# Patient Record
Sex: MALE | Race: WHITE | NOT HISPANIC OR LATINO | Employment: PART TIME | URBAN - METROPOLITAN AREA
[De-identification: names, ages, dates, MRNs, and addresses within clinical notes are randomized per-mention and may not be internally consistent; named-entity substitution may affect disease eponyms.]

---

## 2019-09-25 ENCOUNTER — NURSE TRIAGE (OUTPATIENT)
Dept: NURSING | Facility: CLINIC | Age: 69
End: 2019-09-25

## 2019-09-25 ENCOUNTER — HOSPITAL ENCOUNTER (EMERGENCY)
Facility: CLINIC | Age: 69
Discharge: HOME | DRG: 854 | End: 2019-09-25
Attending: PHYSICIAN ASSISTANT | Admitting: PHYSICIAN ASSISTANT
Payer: MEDICARE

## 2019-09-25 VITALS
DIASTOLIC BLOOD PRESSURE: 82 MMHG | SYSTOLIC BLOOD PRESSURE: 125 MMHG | HEIGHT: 67 IN | BODY MASS INDEX: 31.31 KG/M2 | HEART RATE: 87 BPM | WEIGHT: 199.5 LBS | RESPIRATION RATE: 18 BRPM | TEMPERATURE: 99 F | OXYGEN SATURATION: 97 %

## 2019-09-25 DIAGNOSIS — L03.012 CELLULITIS OF FINGER OF LEFT HAND: ICD-10-CM

## 2019-09-25 LAB
ANION GAP SERPL CALCULATED.3IONS-SCNC: 8 MMOL/L (ref 3–14)
BASOPHILS # BLD AUTO: 0 10E9/L (ref 0–0.2)
BASOPHILS NFR BLD AUTO: 0.2 %
BUN SERPL-MCNC: 13 MG/DL (ref 7–30)
CALCIUM SERPL-MCNC: 8.9 MG/DL (ref 8.5–10.1)
CHLORIDE SERPL-SCNC: 111 MMOL/L (ref 94–109)
CO2 SERPL-SCNC: 24 MMOL/L (ref 20–32)
CREAT SERPL-MCNC: 0.76 MG/DL (ref 0.66–1.25)
CRP SERPL-MCNC: 43.5 MG/L (ref 0–8)
DIFFERENTIAL METHOD BLD: ABNORMAL
EOSINOPHIL NFR BLD AUTO: 0.5 %
ERYTHROCYTE [DISTWIDTH] IN BLOOD BY AUTOMATED COUNT: 13.6 % (ref 10–15)
GFR SERPL CREATININE-BSD FRML MDRD: >90 ML/MIN/{1.73_M2}
GLUCOSE SERPL-MCNC: 122 MG/DL (ref 70–99)
HCT VFR BLD AUTO: 45.9 % (ref 40–53)
HGB BLD-MCNC: 15.7 G/DL (ref 13.3–17.7)
IMM GRANULOCYTES # BLD: 0.1 10E9/L (ref 0–0.4)
IMM GRANULOCYTES NFR BLD: 0.4 %
LACTATE BLD-SCNC: 1.1 MMOL/L (ref 0.7–2)
LYMPHOCYTES # BLD AUTO: 1.3 10E9/L (ref 0.8–5.3)
LYMPHOCYTES NFR BLD AUTO: 6.6 %
MCH RBC QN AUTO: 31.3 PG (ref 26.5–33)
MCHC RBC AUTO-ENTMCNC: 34.2 G/DL (ref 31.5–36.5)
MCV RBC AUTO: 91 FL (ref 78–100)
MONOCYTES # BLD AUTO: 1 10E9/L (ref 0–1.3)
MONOCYTES NFR BLD AUTO: 5.5 %
NEUTROPHILS # BLD AUTO: 16.5 10E9/L (ref 1.6–8.3)
NEUTROPHILS NFR BLD AUTO: 86.8 %
NRBC # BLD AUTO: 0 10*3/UL
NRBC BLD AUTO-RTO: 0 /100
PLATELET # BLD AUTO: 204 10E9/L (ref 150–450)
POTASSIUM SERPL-SCNC: 3.8 MMOL/L (ref 3.4–5.3)
RBC # BLD AUTO: 5.02 10E12/L (ref 4.4–5.9)
SODIUM SERPL-SCNC: 143 MMOL/L (ref 133–144)
WBC # BLD AUTO: 19 10E9/L (ref 4–11)

## 2019-09-25 PROCEDURE — 25000128 H RX IP 250 OP 636: Performed by: PHYSICIAN ASSISTANT

## 2019-09-25 PROCEDURE — 99284 EMERGENCY DEPT VISIT MOD MDM: CPT | Mod: Z6 | Performed by: PHYSICIAN ASSISTANT

## 2019-09-25 PROCEDURE — 86140 C-REACTIVE PROTEIN: CPT | Performed by: PHYSICIAN ASSISTANT

## 2019-09-25 PROCEDURE — 83605 ASSAY OF LACTIC ACID: CPT | Performed by: PHYSICIAN ASSISTANT

## 2019-09-25 PROCEDURE — 96365 THER/PROPH/DIAG IV INF INIT: CPT | Performed by: PHYSICIAN ASSISTANT

## 2019-09-25 PROCEDURE — 80048 BASIC METABOLIC PNL TOTAL CA: CPT | Performed by: PHYSICIAN ASSISTANT

## 2019-09-25 PROCEDURE — 85025 COMPLETE CBC W/AUTO DIFF WBC: CPT | Performed by: PHYSICIAN ASSISTANT

## 2019-09-25 PROCEDURE — 99284 EMERGENCY DEPT VISIT MOD MDM: CPT | Mod: 25 | Performed by: PHYSICIAN ASSISTANT

## 2019-09-25 RX ORDER — CEPHALEXIN 500 MG/1
500 CAPSULE ORAL 4 TIMES DAILY
Qty: 40 CAPSULE | Refills: 0 | Status: ON HOLD | OUTPATIENT
Start: 2019-09-25 | End: 2019-10-01

## 2019-09-25 RX ORDER — OXYCODONE HYDROCHLORIDE 5 MG/1
5 TABLET ORAL EVERY 6 HOURS PRN
Qty: 8 TABLET | Refills: 0 | Status: ON HOLD | OUTPATIENT
Start: 2019-09-25 | End: 2019-09-30

## 2019-09-25 RX ADMIN — TAZOBACTAM SODIUM AND PIPERACILLIN SODIUM 3.38 G: 375; 3 INJECTION, SOLUTION INTRAVENOUS at 18:40

## 2019-09-25 ASSESSMENT — MIFFLIN-ST. JEOR: SCORE: 1633.56

## 2019-09-25 NOTE — ED PROVIDER NOTES
"  History     Chief Complaint   Patient presents with     Insect Bite     The history is provided by the patient and the spouse.     Andrew Alvarez is a 68 year old male who is presenting to the emergency department with complaints of an insect bite on his pointer finger of his left hand. The patient states that he thinks he was bit by something when he was doing some yard work two days ago. He says he was cleaning rotten apples off of the ground, and there was bees, spiders, and little beetles all over. He was putting the apples into a bucket and thought he hit his finger on the bucket. He claims that there was a pus pocket last night, which him and his wife scraped off and drained. His wife says that a lot came out of the wound. She also ronda a line around the swelling, but says that the patient showered and it was washed off. The patient admits that the swelling and redness has increased, difficulty sleeping, pain, chills, and feeling \"off\" all day. He is a diabetic, but does not use insulin. He took Tylenol today around 1430.    Allergies:  No Known Allergies    Problem List:    There are no active problems to display for this patient.       Past Medical History:    No past medical history on file.    Past Surgical History:    No past surgical history on file.    Family History:    No family history on file.    Social History:  Marital Status:   [2]  Social History     Tobacco Use     Smoking status: Not on file   Substance Use Topics     Alcohol use: Not on file     Drug use: Not on file        Medications:    cephALEXin (KEFLEX) 500 MG capsule  oxyCODONE (ROXICODONE) 5 MG tablet          Review of Systems   All other systems reviewed and are negative.      Physical Exam   BP: (!) 150/95  Pulse: 87  Temp: 99  F (37.2  C)  Resp: 18  Height: 170.2 cm (5' 7\")  Weight: 90.5 kg (199 lb 8 oz)  SpO2: 97 %      Physical Exam  Vitals signs and nursing note reviewed.   Constitutional:       General: He is not " in acute distress.     Appearance: He is not diaphoretic.   HENT:      Head: Normocephalic and atraumatic.      Right Ear: External ear normal.      Left Ear: External ear normal.      Nose: Nose normal.      Mouth/Throat:      Pharynx: No oropharyngeal exudate.   Eyes:      General: No scleral icterus.        Right eye: No discharge.         Left eye: No discharge.      Conjunctiva/sclera: Conjunctivae normal.      Pupils: Pupils are equal, round, and reactive to light.   Neck:      Musculoskeletal: Normal range of motion and neck supple.      Thyroid: No thyromegaly.   Cardiovascular:      Rate and Rhythm: Normal rate and regular rhythm.      Heart sounds: Normal heart sounds. No murmur.   Pulmonary:      Effort: Pulmonary effort is normal. No respiratory distress.      Breath sounds: Normal breath sounds. No wheezing or rales.   Chest:      Chest wall: No tenderness.   Abdominal:      General: Bowel sounds are normal. There is no distension.      Palpations: Abdomen is soft. There is no mass.      Tenderness: There is no tenderness. There is no guarding or rebound.   Musculoskeletal: Normal range of motion.         General: No tenderness or deformity.   Lymphadenopathy:      Cervical: No cervical adenopathy.   Skin:     General: Skin is warm and dry.      Capillary Refill: Capillary refill takes less than 2 seconds.      Findings: Erythema present. No rash.      Comments: Swelling of the index finger and dorsal hand extending to the level of the wrist with slight pink erythema and increased warmth.  No fluctuance or induration.  He is able to flex and extend the finger, although it is slow due to the discomfort and swelling.  No abscess palpated.  Distal pulses 2+.  Sensation intact light touch.   Neurological:      Mental Status: He is alert and oriented to person, place, and time.      Cranial Nerves: No cranial nerve deficit.   Psychiatric:         Behavior: Behavior normal.         Thought Content: Thought  content normal.               ED Course        Procedures               Critical Care time:  none               Results for orders placed or performed during the hospital encounter of 09/25/19 (from the past 24 hour(s))   CBC with platelets differential   Result Value Ref Range    WBC 19.0 (H) 4.0 - 11.0 10e9/L    RBC Count 5.02 4.4 - 5.9 10e12/L    Hemoglobin 15.7 13.3 - 17.7 g/dL    Hematocrit 45.9 40.0 - 53.0 %    MCV 91 78 - 100 fl    MCH 31.3 26.5 - 33.0 pg    MCHC 34.2 31.5 - 36.5 g/dL    RDW 13.6 10.0 - 15.0 %    Platelet Count 204 150 - 450 10e9/L    Diff Method Automated Method     % Neutrophils 86.8 %    % Lymphocytes 6.6 %    % Monocytes 5.5 %    % Eosinophils 0.5 %    % Basophils 0.2 %    % Immature Granulocytes 0.4 %    Nucleated RBCs 0 0 /100    Absolute Neutrophil 16.5 (H) 1.6 - 8.3 10e9/L    Absolute Lymphocytes 1.3 0.8 - 5.3 10e9/L    Absolute Monocytes 1.0 0.0 - 1.3 10e9/L    Absolute Basophils 0.0 0.0 - 0.2 10e9/L    Abs Immature Granulocytes 0.1 0 - 0.4 10e9/L    Absolute Nucleated RBC 0.0    Basic metabolic panel   Result Value Ref Range    Sodium 143 133 - 144 mmol/L    Potassium 3.8 3.4 - 5.3 mmol/L    Chloride 111 (H) 94 - 109 mmol/L    Carbon Dioxide 24 20 - 32 mmol/L    Anion Gap 8 3 - 14 mmol/L    Glucose 122 (H) 70 - 99 mg/dL    Urea Nitrogen 13 7 - 30 mg/dL    Creatinine 0.76 0.66 - 1.25 mg/dL    GFR Estimate >90 >60 mL/min/[1.73_m2]    GFR Estimate If Black >90 >60 mL/min/[1.73_m2]    Calcium 8.9 8.5 - 10.1 mg/dL   CRP inflammation   Result Value Ref Range    CRP Inflammation 43.5 (H) 0.0 - 8.0 mg/L   Lactic acid whole blood   Result Value Ref Range    Lactic Acid 1.1 0.7 - 2.0 mmol/L       Medications   piperacillin-tazobactam (ZOSYN) infusion 3.375 g (0 g Intravenous Stopped 9/25/19 1907)       Assessments & Plan (with Medical Decision Making)  Cellulitis of finger of left hand     68 year old male presents for evaluation of a swollen, red, and painful index finger with swelling  extending up in the hand today.  Symptoms started after an insect bite on the finger 3 days ago.  But he did develop a pustule there which he and his wife deroofed yesterday to drain the pus.  He does not feel that there is any further pus there.  Started experiencing some chills and generalized not feeling well later this afternoon.  On exam blood pressure 125/82, pulse 87, temperature 99.0, and oxygen saturation 97% on room air.  Patient with a swollen left index finger extending up into the dorsal hand with erythema and increased warmth.  No sign of abscess.  No pustule.  IV established.  White blood cell count elevated 19,000 with a stable hemoglobin of 15.7.  CRP elevated at 43.5 as well.  Lactic acid level normal at 1.1.  Comprehensive metabolic panel with an elevated nonfasting glucose of 122, but other levels normal.  Symptoms consistent with cellulitis.  No sign of abscess.  IV antibiotics in the form of Ancef given.  We discussed the options of observation stay for continued IV antibiotic therapy versus trial of outpatient management with oral antibiotics.  Patient would rather not have to stay in the hospital if at all possible and would like to attempt outpatient management.  Therefore, he was discharged on Keflex 500 mg 4 times daily.  Rest the hand.  Elevate it is much as possible.  Warm compresses to be applied for 20 minutes every 1-2 hours.  Strict return instructions reviewed with him.  Close follow-up in the clinic in 2 days to recheck his condition.   I did give him a small supply of oxycodone to use as needed for breakthrough pain to help him sleep better the next couple nights.  Patient was in agreement with this plan and was suitable for discharge.     I have reviewed the nursing notes.    I have reviewed the findings, diagnosis, plan and need for follow up with the patient.       Discharge Medication List as of 9/25/2019  7:22 PM      START taking these medications    Details   cephALEXin  (KEFLEX) 500 MG capsule Take 1 capsule (500 mg) by mouth 4 times daily for 10 days, Disp-40 capsule, R-0, E-Prescribe             Final diagnoses:   Cellulitis of finger of left hand       This document serves as a record of services personally performed by Chris Jackson PA*. It was created on their behalf by Sarahi Hickman, a trained medical scribe. The creation of this record is based on the provider's personal observations and the statements of the patient. This document has been checked and approved by the attending provider.  Note: Chart documentation done in part with Dragon Voice Recognition software. Although reviewed after completion, some word and grammatical errors may remain.  9/25/2019   Chris Jackson PA-C   [facility] EMERGENCY DEPARTMENT     Chris Jackson PA-C  09/26/19 0034

## 2019-09-25 NOTE — TELEPHONE ENCOUNTER
"Andrew got a sting or a bite on his finger 2 days ago and today his whole hand swollen and can't bend the finger the bite is on. Looking purple black around bite area today. Starting to feel \"fluish.\" Advised to bring him directly to the emergency room. [facility] is closest and wife will drive him there now. Andrew had given me a verbal ok to speak with his wife Coby about his health care, and he was on the phone until disposition given.     Amada Welch RN, [facility]      Additional Information    Negative: Passed out (i.e., fainted, collapsed and was not responding)    Negative: Wheezing or difficulty breathing    Negative: Hoarseness, cough, or tightness in the throat or chest    Negative: Swollen tongue or difficulty swallowing    Negative: Life-threatening reaction in past to sting (anaphylaxis) and < 2 hours since sting    Negative: Sounds like a life-threatening emergency to the triager    Negative: Not a bee, wasp, hornet, or yellow jacket sting    Negative: Widespread hives, itching, or facial swelling and started within 2 hours of sting    Negative: Vomiting or abdominal cramps and started within 2 hours of sting    Negative: Gave epinephrine shot and no symptoms now    Patient sounds very sick or weak to the triager    Protocols used: BEE STING-A-OH      "

## 2019-09-25 NOTE — ED AVS SNAPSHOT
[facility] Emergency Department  [address] [place] [address]  Phone:  [phone]  Fax:  [phone]                                    Andrew Alvarez   MRN: [id number]    Department:  [facility] Emergency Department   Date of Visit:  9/25/2019           After Visit Summary Signature Page    I have received my discharge instructions, and my questions have been answered. I have discussed any challenges I see with this plan with the nurse or doctor.    ..........................................................................................................................................  Patient/Patient Representative Signature      ..........................................................................................................................................  Patient Representative Print Name and Relationship to Patient    ..................................................               ................................................  Date                                   Time    ..........................................................................................................................................  Reviewed by Signature/Title    ...................................................              ..............................................  Date                                               Time          22EPIC Rev 08/18

## 2019-09-25 NOTE — ED TRIAGE NOTES
He was bitten by an insect 2 days ago when doing yard work.  He thinks it was a bee or spider.  His L index finger is red warm and swollen and he has had chills and shivering when his wife got home.

## 2019-09-25 NOTE — LETTER
September 25, 2019      To Whom It May Concern:      Andrew Alvarez was seen in our Emergency Department today, 09/25/19.  I expect his condition to improve over the next few days.  He may return to work when improved.  Please excuse him from work on 9/26/2019 and 9/27/2019 due to his illness.      Sincerely,            Chris Jackson PA-C

## 2019-09-26 NOTE — DISCHARGE INSTRUCTIONS
It was a pleasure working with you today!  I hope your condition improves rapidly!     Please REST your finger.  Elevate it as much as possible.  Submerse it in warm/hot water for 15-20 minutes every 1-2 hours for the next couple of days.   Start the antibiotic, cephalexin, this evening at midnight.    Do not hesitate to return to the ED at any time if your symptoms are worsening or if you develop another pus pocket.    Otherwise, plan on a follow-up appointment with your primary care provider in 2 days to recheck your condition.

## 2019-09-27 ENCOUNTER — HOSPITAL ENCOUNTER (INPATIENT)
Facility: CLINIC | Age: 69
LOS: 4 days | Discharge: HOME | DRG: 854 | End: 2019-10-01
Attending: PHYSICIAN ASSISTANT | Admitting: FAMILY MEDICINE
Payer: MEDICARE

## 2019-09-27 DIAGNOSIS — L08.9 INSECT BITE OF LEFT HAND WITH INFECTION, INITIAL ENCOUNTER: Primary | ICD-10-CM

## 2019-09-27 DIAGNOSIS — L03.012 CELLULITIS OF FINGER OF LEFT HAND: ICD-10-CM
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
## 2019-09-27 DIAGNOSIS — S60.562A INSECT BITE OF LEFT HAND WITH INFECTION, INITIAL ENCOUNTER: Primary | ICD-10-CM

## 2019-09-27 PROBLEM — B18.2 CHRONIC HEPATITIS C WITHOUT HEPATIC COMA (H): Status: ACTIVE | Noted: 2019-09-27

## 2019-09-27 PROBLEM — E11.9 TYPE 2 DIABETES MELLITUS WITHOUT COMPLICATION, WITHOUT LONG-TERM CURRENT USE OF INSULIN (H): Status: ACTIVE | Noted: 2019-09-27

## 2019-09-27 PROBLEM — F17.210 CIGARETTE SMOKER: Status: ACTIVE | Noted: 2019-09-27

## 2019-09-27 PROBLEM — G47.33 OSA (OBSTRUCTIVE SLEEP APNEA): Status: ACTIVE | Noted: 2019-09-27

## 2019-09-27 LAB
ALBUMIN SERPL-MCNC: 3.2 G/DL (ref 3.4–5)
ALP SERPL-CCNC: 69 U/L (ref 40–150)
ALT SERPL W P-5'-P-CCNC: 18 U/L (ref 0–70)
ANION GAP SERPL CALCULATED.3IONS-SCNC: 8 MMOL/L (ref 3–14)
AST SERPL W P-5'-P-CCNC: 11 U/L (ref 0–45)
BASOPHILS # BLD AUTO: 0.1 10E9/L (ref 0–0.2)
BASOPHILS NFR BLD AUTO: 0.4 %
BILIRUB SERPL-MCNC: 0.3 MG/DL (ref 0.2–1.3)
BUN SERPL-MCNC: 21 MG/DL (ref 7–30)
CALCIUM SERPL-MCNC: 8.3 MG/DL (ref 8.5–10.1)
CHLORIDE SERPL-SCNC: 110 MMOL/L (ref 94–109)
CO2 SERPL-SCNC: 24 MMOL/L (ref 20–32)
CREAT SERPL-MCNC: 0.75 MG/DL (ref 0.66–1.25)
CRP SERPL-MCNC: 54.6 MG/L (ref 0–8)
DIFFERENTIAL METHOD BLD: ABNORMAL
EOSINOPHIL NFR BLD AUTO: 1.4 %
ERYTHROCYTE [DISTWIDTH] IN BLOOD BY AUTOMATED COUNT: 13.7 % (ref 10–15)
GFR SERPL CREATININE-BSD FRML MDRD: >90 ML/MIN/{1.73_M2}
GLUCOSE SERPL-MCNC: 132 MG/DL (ref 70–99)
HCT VFR BLD AUTO: 41.9 % (ref 40–53)
HGB BLD-MCNC: 14.3 G/DL (ref 13.3–17.7)
IMM GRANULOCYTES # BLD: 0.1 10E9/L (ref 0–0.4)
IMM GRANULOCYTES NFR BLD: 0.4 %
LACTATE BLD-SCNC: 0.8 MMOL/L (ref 0.7–2)
LYMPHOCYTES # BLD AUTO: 2.2 10E9/L (ref 0.8–5.3)
LYMPHOCYTES NFR BLD AUTO: 16 %
MCH RBC QN AUTO: 31.4 PG (ref 26.5–33)
MCHC RBC AUTO-ENTMCNC: 34.1 G/DL (ref 31.5–36.5)
MCV RBC AUTO: 92 FL (ref 78–100)
MONOCYTES # BLD AUTO: 0.9 10E9/L (ref 0–1.3)
MONOCYTES NFR BLD AUTO: 6.5 %
NEUTROPHILS # BLD AUTO: 10.4 10E9/L (ref 1.6–8.3)
NEUTROPHILS NFR BLD AUTO: 75.3 %
NRBC # BLD AUTO: 0 10*3/UL
NRBC BLD AUTO-RTO: 0 /100
PLATELET # BLD AUTO: 203 10E9/L (ref 150–450)
POTASSIUM SERPL-SCNC: 3.5 MMOL/L (ref 3.4–5.3)
PROT SERPL-MCNC: 7.4 G/DL (ref 6.8–8.8)
RBC # BLD AUTO: 4.55 10E12/L (ref 4.4–5.9)
SODIUM SERPL-SCNC: 142 MMOL/L (ref 133–144)
WBC # BLD AUTO: 13.8 10E9/L (ref 4–11)

## 2019-09-27 PROCEDURE — 99223 1ST HOSP IP/OBS HIGH 75: CPT | Mod: AI | Performed by: FAMILY MEDICINE

## 2019-09-27 PROCEDURE — 25000128 H RX IP 250 OP 636: Performed by: FAMILY MEDICINE

## 2019-09-27 PROCEDURE — 99207 ZZC CDG-MDM COMPONENT: MEETS LOW - DOWN CODED: CPT | Performed by: FAMILY MEDICINE

## 2019-09-27 PROCEDURE — 12000000 ZZH R&B MED SURG/OB

## 2019-09-27 PROCEDURE — 80053 COMPREHEN METABOLIC PANEL: CPT | Performed by: PHYSICIAN ASSISTANT

## 2019-09-27 PROCEDURE — 83605 ASSAY OF LACTIC ACID: CPT | Performed by: PHYSICIAN ASSISTANT

## 2019-09-27 PROCEDURE — 25800030 ZZH RX IP 258 OP 636: Performed by: FAMILY MEDICINE

## 2019-09-27 PROCEDURE — 87070 CULTURE OTHR SPECIMN AEROBIC: CPT | Performed by: PHYSICIAN ASSISTANT

## 2019-09-27 PROCEDURE — 86140 C-REACTIVE PROTEIN: CPT | Performed by: PHYSICIAN ASSISTANT

## 2019-09-27 PROCEDURE — 99285 EMERGENCY DEPT VISIT HI MDM: CPT | Mod: Z6 | Performed by: FAMILY MEDICINE

## 2019-09-27 PROCEDURE — 87040 BLOOD CULTURE FOR BACTERIA: CPT | Performed by: PHYSICIAN ASSISTANT

## 2019-09-27 PROCEDURE — 96365 THER/PROPH/DIAG IV INF INIT: CPT | Performed by: FAMILY MEDICINE

## 2019-09-27 PROCEDURE — 87205 SMEAR GRAM STAIN: CPT | Performed by: PHYSICIAN ASSISTANT

## 2019-09-27 PROCEDURE — 85025 COMPLETE CBC W/AUTO DIFF WBC: CPT | Performed by: PHYSICIAN ASSISTANT

## 2019-09-27 PROCEDURE — 25000128 H RX IP 250 OP 636: Performed by: PHYSICIAN ASSISTANT

## 2019-09-27 PROCEDURE — 99285 EMERGENCY DEPT VISIT HI MDM: CPT | Mod: 25 | Performed by: FAMILY MEDICINE

## 2019-09-27 PROCEDURE — 96367 TX/PROPH/DG ADDL SEQ IV INF: CPT | Performed by: FAMILY MEDICINE

## 2019-09-27 PROCEDURE — 25800030 ZZH RX IP 258 OP 636: Performed by: PHYSICIAN ASSISTANT

## 2019-09-27 RX ORDER — CETIRIZINE HYDROCHLORIDE 5 MG/1
5 TABLET ORAL DAILY
COMMUNITY

## 2019-09-27 RX ORDER — PHENOL 1.4 %
600 AEROSOL, SPRAY (ML) MUCOUS MEMBRANE DAILY
COMMUNITY
Start: 2006-12-18

## 2019-09-27 RX ORDER — ASPIRIN 81 MG/1
81 TABLET ORAL DAILY
COMMUNITY
Start: 2006-12-18

## 2019-09-27 RX ADMIN — VANCOMYCIN HYDROCHLORIDE 1500 MG: 1 INJECTION, POWDER, LYOPHILIZED, FOR SOLUTION INTRAVENOUS at 23:53

## 2019-09-27 RX ADMIN — TAZOBACTAM SODIUM AND PIPERACILLIN SODIUM 3.38 G: 375; 3 INJECTION, SOLUTION INTRAVENOUS at 23:23

## 2019-09-27 NOTE — LETTER
[address] MEDICAL SURGICAL  [address] [place] [address]  Phone: [phone]    October 1, 2019        Andrew Alvarez  [address] [place] [address]      To whom it may concern:    RE: Andrew Alvarez    Patient was hospitalized and under my care from 9/27/19-10/1/19. Patient may not return to work until 10/9/18.     Please contact me for questions or concerns.      Sincerely,        MARCO A Pascual-CNP  [facility]

## 2019-09-28 ENCOUNTER — APPOINTMENT (OUTPATIENT)
Dept: GENERAL RADIOLOGY | Facility: CLINIC | Age: 69
DRG: 854 | End: 2019-09-28
Attending: NURSE PRACTITIONER
Payer: MEDICARE

## 2019-09-28 LAB
ANION GAP SERPL CALCULATED.3IONS-SCNC: 8 MMOL/L (ref 3–14)
BUN SERPL-MCNC: 19 MG/DL (ref 7–30)
CALCIUM SERPL-MCNC: 8.2 MG/DL (ref 8.5–10.1)
CHLORIDE SERPL-SCNC: 114 MMOL/L (ref 94–109)
CO2 SERPL-SCNC: 23 MMOL/L (ref 20–32)
CREAT SERPL-MCNC: 0.83 MG/DL (ref 0.66–1.25)
ERYTHROCYTE [DISTWIDTH] IN BLOOD BY AUTOMATED COUNT: 13.7 % (ref 10–15)
GFR SERPL CREATININE-BSD FRML MDRD: 90 ML/MIN/{1.73_M2}
GLUCOSE BLDC GLUCOMTR-MCNC: 119 MG/DL (ref 70–99)
GLUCOSE BLDC GLUCOMTR-MCNC: 137 MG/DL (ref 70–99)
GLUCOSE BLDC GLUCOMTR-MCNC: 155 MG/DL (ref 70–99)
GLUCOSE BLDC GLUCOMTR-MCNC: 97 MG/DL (ref 70–99)
GLUCOSE SERPL-MCNC: 157 MG/DL (ref 70–99)
GRAM STN SPEC: NORMAL
HCT VFR BLD AUTO: 40.1 % (ref 40–53)
HGB BLD-MCNC: 13.5 G/DL (ref 13.3–17.7)
Lab: NORMAL
MCH RBC QN AUTO: 31 PG (ref 26.5–33)
MCHC RBC AUTO-ENTMCNC: 33.7 G/DL (ref 31.5–36.5)
MCV RBC AUTO: 92 FL (ref 78–100)
PLATELET # BLD AUTO: 181 10E9/L (ref 150–450)
POTASSIUM SERPL-SCNC: 3.6 MMOL/L (ref 3.4–5.3)
RBC # BLD AUTO: 4.36 10E12/L (ref 4.4–5.9)
SODIUM SERPL-SCNC: 145 MMOL/L (ref 133–144)
SPECIMEN SOURCE: NORMAL
WBC # BLD AUTO: 11.2 10E9/L (ref 4–11)

## 2019-09-28 PROCEDURE — 25000132 ZZH RX MED GY IP 250 OP 250 PS 637: Performed by: FAMILY MEDICINE

## 2019-09-28 PROCEDURE — 36415 COLL VENOUS BLD VENIPUNCTURE: CPT | Performed by: FAMILY MEDICINE

## 2019-09-28 PROCEDURE — 12000000 ZZH R&B MED SURG/OB

## 2019-09-28 PROCEDURE — 25000128 H RX IP 250 OP 636: Performed by: FAMILY MEDICINE

## 2019-09-28 PROCEDURE — 25800030 ZZH RX IP 258 OP 636: Performed by: FAMILY MEDICINE

## 2019-09-28 PROCEDURE — 00000146 ZZHCL STATISTIC GLUCOSE BY METER IP

## 2019-09-28 PROCEDURE — 80048 BASIC METABOLIC PNL TOTAL CA: CPT | Performed by: FAMILY MEDICINE

## 2019-09-28 PROCEDURE — 99233 SBSQ HOSP IP/OBS HIGH 50: CPT | Performed by: NURSE PRACTITIONER

## 2019-09-28 PROCEDURE — 87081 CULTURE SCREEN ONLY: CPT | Performed by: FAMILY MEDICINE

## 2019-09-28 PROCEDURE — 25800029 ZZH RX IP 258 OP 250: Performed by: HOSPITALIST

## 2019-09-28 PROCEDURE — 85027 COMPLETE CBC AUTOMATED: CPT | Performed by: FAMILY MEDICINE

## 2019-09-28 PROCEDURE — 73140 X-RAY EXAM OF FINGER(S): CPT | Mod: TC,LT

## 2019-09-28 RX ORDER — ONDANSETRON 2 MG/ML
4 INJECTION INTRAMUSCULAR; INTRAVENOUS EVERY 6 HOURS PRN
Status: DISCONTINUED | OUTPATIENT
Start: 2019-09-28 | End: 2019-10-01 | Stop reason: HOSPADM

## 2019-09-28 RX ORDER — ASPIRIN 81 MG/1
81 TABLET ORAL DAILY
Status: DISCONTINUED | OUTPATIENT
Start: 2019-09-28 | End: 2019-10-01 | Stop reason: HOSPADM

## 2019-09-28 RX ORDER — ONDANSETRON 4 MG/1
4 TABLET, ORALLY DISINTEGRATING ORAL EVERY 6 HOURS PRN
Status: DISCONTINUED | OUTPATIENT
Start: 2019-09-28 | End: 2019-10-01 | Stop reason: HOSPADM

## 2019-09-28 RX ORDER — LIDOCAINE 40 MG/G
CREAM TOPICAL
Status: DISCONTINUED | OUTPATIENT
Start: 2019-09-28 | End: 2019-09-30

## 2019-09-28 RX ORDER — IBUPROFEN 600 MG/1
600 TABLET, FILM COATED ORAL EVERY 6 HOURS PRN
Status: DISCONTINUED | OUTPATIENT
Start: 2019-09-28 | End: 2019-10-01 | Stop reason: HOSPADM

## 2019-09-28 RX ORDER — CETIRIZINE HYDROCHLORIDE 5 MG/1
5 TABLET ORAL DAILY
Status: DISCONTINUED | OUTPATIENT
Start: 2019-09-28 | End: 2019-10-01 | Stop reason: HOSPADM

## 2019-09-28 RX ORDER — SODIUM CHLORIDE 450 MG/100ML
INJECTION, SOLUTION INTRAVENOUS CONTINUOUS
Status: DISCONTINUED | OUTPATIENT
Start: 2019-09-28 | End: 2019-09-28

## 2019-09-28 RX ORDER — ACETAMINOPHEN 325 MG/1
650 TABLET ORAL EVERY 4 HOURS PRN
Status: DISCONTINUED | OUTPATIENT
Start: 2019-09-28 | End: 2019-10-01 | Stop reason: HOSPADM

## 2019-09-28 RX ORDER — OXYCODONE HYDROCHLORIDE 5 MG/1
5-10 TABLET ORAL EVERY 4 HOURS PRN
Status: DISCONTINUED | OUTPATIENT
Start: 2019-09-28 | End: 2019-10-01 | Stop reason: HOSPADM

## 2019-09-28 RX ORDER — SODIUM CHLORIDE 9 MG/ML
INJECTION, SOLUTION INTRAVENOUS CONTINUOUS
Status: DISCONTINUED | OUTPATIENT
Start: 2019-09-28 | End: 2019-09-28

## 2019-09-28 RX ORDER — NALOXONE HYDROCHLORIDE 0.4 MG/ML
.1-.4 INJECTION, SOLUTION INTRAMUSCULAR; INTRAVENOUS; SUBCUTANEOUS
Status: DISCONTINUED | OUTPATIENT
Start: 2019-09-28 | End: 2019-09-30

## 2019-09-28 RX ADMIN — TAZOBACTAM SODIUM AND PIPERACILLIN SODIUM 3.38 G: 375; 3 INJECTION, SOLUTION INTRAVENOUS at 13:36

## 2019-09-28 RX ADMIN — ASPIRIN 81 MG: 81 TABLET ORAL at 09:31

## 2019-09-28 RX ADMIN — SODIUM CHLORIDE: 4.5 INJECTION, SOLUTION INTRAVENOUS at 07:52

## 2019-09-28 RX ADMIN — CETIRIZINE HYDROCHLORIDE 5 MG: 1 SOLUTION ORAL at 09:31

## 2019-09-28 RX ADMIN — OXYCODONE HYDROCHLORIDE 5 MG: 5 TABLET ORAL at 13:12

## 2019-09-28 RX ADMIN — ACETAMINOPHEN 650 MG: 325 TABLET, FILM COATED ORAL at 17:36

## 2019-09-28 RX ADMIN — OXYCODONE HYDROCHLORIDE 5 MG: 5 TABLET ORAL at 01:24

## 2019-09-28 RX ADMIN — IBUPROFEN 600 MG: 600 TABLET ORAL at 13:13

## 2019-09-28 RX ADMIN — TAZOBACTAM SODIUM AND PIPERACILLIN SODIUM 3.38 G: 375; 3 INJECTION, SOLUTION INTRAVENOUS at 06:08

## 2019-09-28 RX ADMIN — VANCOMYCIN HYDROCHLORIDE 1500 MG: 1 INJECTION, POWDER, LYOPHILIZED, FOR SOLUTION INTRAVENOUS at 10:36

## 2019-09-28 RX ADMIN — TAZOBACTAM SODIUM AND PIPERACILLIN SODIUM 3.38 G: 375; 3 INJECTION, SOLUTION INTRAVENOUS at 19:43

## 2019-09-28 RX ADMIN — METFORMIN HYDROCHLORIDE 1000 MG: 500 TABLET, FILM COATED ORAL at 17:31

## 2019-09-28 RX ADMIN — SODIUM CHLORIDE: 9 INJECTION, SOLUTION INTRAVENOUS at 01:25

## 2019-09-28 RX ADMIN — OXYCODONE HYDROCHLORIDE 10 MG: 5 TABLET ORAL at 21:51

## 2019-09-28 RX ADMIN — IBUPROFEN 600 MG: 600 TABLET ORAL at 19:41

## 2019-09-28 RX ADMIN — ACETAMINOPHEN 650 MG: 325 TABLET, FILM COATED ORAL at 21:51

## 2019-09-28 RX ADMIN — OXYCODONE HYDROCHLORIDE 5 MG: 5 TABLET ORAL at 17:36

## 2019-09-28 RX ADMIN — METFORMIN HYDROCHLORIDE 500 MG: 500 TABLET, FILM COATED ORAL at 07:49

## 2019-09-28 RX ADMIN — VANCOMYCIN HYDROCHLORIDE 1500 MG: 1 INJECTION, POWDER, LYOPHILIZED, FOR SOLUTION INTRAVENOUS at 23:01

## 2019-09-28 ASSESSMENT — ACTIVITIES OF DAILY LIVING (ADL)
RETIRED_COMMUNICATION: 0-->UNDERSTANDS/COMMUNICATES WITHOUT DIFFICULTY
ADLS_ACUITY_SCORE: 10
RETIRED_EATING: 0-->INDEPENDENT
ADLS_ACUITY_SCORE: 10
ADLS_ACUITY_SCORE: 10
TRANSFERRING: 0-->INDEPENDENT
SWALLOWING: 0-->SWALLOWS FOODS/LIQUIDS WITHOUT DIFFICULTY
COGNITION: 0 - NO COGNITION ISSUES REPORTED
DRESS: 0-->INDEPENDENT
TOILETING: 0-->INDEPENDENT
FALL_HISTORY_WITHIN_LAST_SIX_MONTHS: NO
BATHING: 0-->INDEPENDENT
AMBULATION: 0-->INDEPENDENT

## 2019-09-28 ASSESSMENT — MIFFLIN-ST. JEOR: SCORE: 1624.94

## 2019-09-28 NOTE — ED PROVIDER NOTES
History     Chief Complaint   Patient presents with     Wound Check     HPI  Andrew Alvarez is a 68 year old male who presents for recheck of cellulitis originating on the dorsal aspect of the left index finger.  Originally diagnosed with this 2 days prior by myself here in the ED.  He was given 1 dose of IV Ancef and then sent home on p.o. cephalexin.  He reports that his symptoms are worsening.  He has had significant fatigue and excessive sleeping.  Has felt feverish and chilled.  Has not checked his temperature.  Reports that the swelling and redness has increased and he now has redness going up his forearm.  He did develop a pus pocket on the index finger just this afternoon.  Reports taking his cephalexin as prescribed.  He has been elevating it and applying warm compresses on a regular basis.        Allergies:  No Known Allergies    Problem List:    Patient Active Problem List    Diagnosis Date Noted     Cellulitis of finger of left hand 09/27/2019     Priority: Medium     SHOLA (obstructive sleep apnea) 09/27/2019     Priority: Medium     Cigarette smoker 09/27/2019     Priority: Medium     Type 2 diabetes mellitus without complication, without long-term current use of insulin (H) 09/27/2019     Priority: Medium     Chronic hepatitis C without hepatic coma (H) 09/27/2019     Priority: Medium        Past Medical History:    Past Medical History:   Diagnosis Date     DM2 (diabetes mellitus, type 2) (H)      Hepatitis C carrier (H)      SHOLA on CPAP        Past Surgical History:    Past Surgical History:   Procedure Laterality Date     SHOULDER SURGERY  2000     TONSILLECTOMY & ADENOIDECTOMY  1990       Family History:    Family History   Problem Relation Age of Onset     Diabetes Mother      Heart Disease Father      Cerebrovascular Disease Father         hx of stroke       Social History:  Marital Status:   [2]  Social History     Tobacco Use     Smoking status: Current Every Day Smoker      Packs/day: 0.25   Substance Use Topics     Alcohol use: Not Currently     Drug use: Not on file        Medications:    aspirin 81 MG EC tablet  calcium carbonate (CALCIUM CARBONATE) 600 MG tablet  cephALEXin (KEFLEX) 500 MG capsule  cetirizine (ZYRTEC) 5 MG tablet  metFORMIN (GLUCOPHAGE) 500 MG tablet  metFORMIN (GLUCOPHAGE) 500 MG tablet  oxyCODONE (ROXICODONE) 5 MG tablet          Review of Systems   All other systems reviewed and are negative.      Physical Exam   BP: (!) 132/95  Pulse: 84  Heart Rate: 82  Temp: 97  F (36.1  C)  Resp: 20  SpO2: 99 %      Physical Exam  Vitals signs and nursing note reviewed.   Constitutional:       General: He is not in acute distress.     Appearance: He is not diaphoretic.   HENT:      Head: Normocephalic and atraumatic.      Right Ear: External ear normal.      Left Ear: External ear normal.      Nose: Nose normal.      Mouth/Throat:      Pharynx: No oropharyngeal exudate.   Eyes:      General: No scleral icterus.        Right eye: No discharge.         Left eye: No discharge.      Conjunctiva/sclera: Conjunctivae normal.      Pupils: Pupils are equal, round, and reactive to light.   Neck:      Musculoskeletal: Normal range of motion and neck supple.      Thyroid: No thyromegaly.   Cardiovascular:      Rate and Rhythm: Normal rate and regular rhythm.      Heart sounds: Normal heart sounds. No murmur.   Pulmonary:      Effort: Pulmonary effort is normal. No respiratory distress.      Breath sounds: Normal breath sounds. No wheezing or rales.   Chest:      Chest wall: No tenderness.   Abdominal:      General: Bowel sounds are normal. There is no distension.      Palpations: Abdomen is soft. There is no mass.      Tenderness: There is no tenderness. There is no guarding or rebound.   Musculoskeletal: Normal range of motion.         General: No tenderness or deformity.   Lymphadenopathy:      Cervical: No cervical adenopathy.   Skin:     Capillary Refill: Capillary refill takes  less than 2 seconds.      Findings: Erythema present. No rash.      Comments: Significant swelling of the left index finger and hand.  There is a small 1.5 x 1 cm superficial pus pocket/pustule just proximal to the PIP joint.  Spreading erythema up the radial aspect of the forearm up to the proximal one third.  Borders were marked with a marker.  Very tender to palpation over the hand and finger.   Neurological:      Mental Status: He is alert and oriented to person, place, and time.      Cranial Nerves: No cranial nerve deficit.   Psychiatric:         Behavior: Behavior normal.         Thought Content: Thought content normal.                     ED Course        Procedures               Critical Care time:  none               Results for orders placed or performed during the hospital encounter of 09/27/19 (from the past 24 hour(s))   Gram stain   Result Value Ref Range    Specimen Description Wound     Gram Stain No organisms seen     Gram Stain       Gram stain result is preliminary and awaits review of Microbiology Staff.   CBC with platelets differential   Result Value Ref Range    WBC 13.8 (H) 4.0 - 11.0 10e9/L    RBC Count 4.55 4.4 - 5.9 10e12/L    Hemoglobin 14.3 13.3 - 17.7 g/dL    Hematocrit 41.9 40.0 - 53.0 %    MCV 92 78 - 100 fl    MCH 31.4 26.5 - 33.0 pg    MCHC 34.1 31.5 - 36.5 g/dL    RDW 13.7 10.0 - 15.0 %    Platelet Count 203 150 - 450 10e9/L    Diff Method Automated Method     % Neutrophils 75.3 %    % Lymphocytes 16.0 %    % Monocytes 6.5 %    % Eosinophils 1.4 %    % Basophils 0.4 %    % Immature Granulocytes 0.4 %    Nucleated RBCs 0 0 /100    Absolute Neutrophil 10.4 (H) 1.6 - 8.3 10e9/L    Absolute Lymphocytes 2.2 0.8 - 5.3 10e9/L    Absolute Monocytes 0.9 0.0 - 1.3 10e9/L    Absolute Basophils 0.1 0.0 - 0.2 10e9/L    Abs Immature Granulocytes 0.1 0 - 0.4 10e9/L    Absolute Nucleated RBC 0.0    Comprehensive metabolic panel   Result Value Ref Range    Sodium 142 133 - 144 mmol/L    Potassium  3.5 3.4 - 5.3 mmol/L    Chloride 110 (H) 94 - 109 mmol/L    Carbon Dioxide 24 20 - 32 mmol/L    Anion Gap 8 3 - 14 mmol/L    Glucose 132 (H) 70 - 99 mg/dL    Urea Nitrogen 21 7 - 30 mg/dL    Creatinine 0.75 0.66 - 1.25 mg/dL    GFR Estimate >90 >60 mL/min/[1.73_m2]    GFR Estimate If Black >90 >60 mL/min/[1.73_m2]    Calcium 8.3 (L) 8.5 - 10.1 mg/dL    Bilirubin Total 0.3 0.2 - 1.3 mg/dL    Albumin 3.2 (L) 3.4 - 5.0 g/dL    Protein Total 7.4 6.8 - 8.8 g/dL    Alkaline Phosphatase 69 40 - 150 U/L    ALT 18 0 - 70 U/L    AST 11 0 - 45 U/L   Lactic acid whole blood   Result Value Ref Range    Lactic Acid 0.8 0.7 - 2.0 mmol/L   CRP inflammation   Result Value Ref Range    CRP Inflammation 54.6 (H) 0.0 - 8.0 mg/L       Medications   vancomycin (VANCOCIN) 1,500 mg in sodium chloride 0.9 % 250 mL intermittent infusion (1,500 mg Intravenous New Bag 9/27/19 2353)   piperacillin-tazobactam (ZOSYN) infusion 3.375 g (0 g Intravenous Stopped 9/27/19 2353)       Assessments & Plan (with Medical Decision Making)  Cellulitis of finger of left hand     68 year old male presents for evaluation of worsening cellulitis on the left hand extending up into the forearm.  Has felt feverish and chilled throughout the day today and has had severe fatigue despite IV dose of Ancef in the ED 2 days prior and p.o. cephalexin therapy since then.  On exam blood pressure 131/82, pulse 82, temperature 98.4, and oxygen saturation 94% on room air.  Patient has a pustule on the dorsal aspect of the left index finger which is the source area of infection.  18-gauge was used to decompress this and a very very small amount of pus came out which was cultured.  He has swelling and erythema extending up to the proximal forearm.  Tender to palpation over the dorsal hand.  No sign of active abscess.  IV was established and labs obtained showing an elevated white blood cell count of 13,800 with stable hemoglobin of 14.3.  Comprehensive metabolic panel with an  elevated nonfasting glucose of 132, but other levels are within normal limits.  Lactic acid level normal at 0.8.  CRP elevated at 54.6 consistent with worsening infection.  Patient was given IV doses of Zosyn and vancomycin pending wound culture results.  Dr. Escobar, inpatient hospitalist, has agreed to accept his care for inpatient management.  Dr. Ba, ED MD, was involved with his care as well.     I have reviewed the nursing notes.    I have reviewed the findings, diagnosis, plan and need for follow up with the patient.       New Prescriptions    No medications on file       Final diagnoses:   Cellulitis of finger of left hand       Disclaimer: This note consists of symbols derived from keyboarding, dictation and/or voice recognition software. As a result, there may be errors in the script that have gone undetected. Please consider this when interpreting information found in this chart.      9/27/2019   Chris Jackson PA-C   [facility] EMERGENCY DEPARTMENT     Chris Jackson PA-C  09/28/19 0040

## 2019-09-28 NOTE — PLAN OF CARE
"Ambulating in fall frequently. No edema in left arm, but left hand remains swollen and red especially over first knuckle. Took 5mg Oxycodone x2 and Ibuprofen and Tylenol once each with the medications \"taking the edge off\". Ate well. Voiding and showered. Afebrile. Refused Nicotine patch. Blood sugars were 155, 137, and 97. WBC down to 11.2 this morning. Saline locked and receiving scheduled Vanco and Zosyn. Will have hand x-ray tonight and MRI tomorrow morning. Will continue to monitor left hand wound and swelling, pain, labs.  "

## 2019-09-28 NOTE — PHARMACY-VANCOMYCIN DOSING SERVICE
Pharmacy Vancomycin Initial Note  Date of Service 2019  Patient's  1950  68 year old, male    Indication: Skin and Soft Tissue Infection    Current estimated CrCl = Estimated Creatinine Clearance: 99.9 mL/min (based on SCr of 0.76 mg/dL).    Creatinine for last 3 days  2019:  6:40 PM Creatinine 0.76 mg/dL    Recent Vancomycin Level(s) for last 3 days  No results found for requested labs within last 72 hours.      Vancomycin IV Administrations (past 72 hours)      No vancomycin orders with administrations in past 72 hours.                Nephrotoxins and other renal medications (From now, onward)    Start     Dose/Rate Route Frequency Ordered Stop    19 2303  vancomycin (VANCOCIN) 1,500 mg in sodium chloride 0.9 % 250 mL intermittent infusion      1,500 mg  over 90 Minutes Intravenous EVERY 12 HOURS 19 23019 223  piperacillin-tazobactam (ZOSYN) infusion 3.375 g      3.375 g  100 mL/hr over 30 Minutes Intravenous ONCE 19 2236            Contrast Orders - past 72 hours (72h ago, onward)    None                Plan:  1.  Start vancomycin  1500 mg IV q12h.   2.  Goal Trough Level: 10-15 mg/L   3.  Pharmacy will check trough levels as appropriate in 1-3 Days.    4. Serum creatinine levels will be ordered daily for the first week of therapy and at least twice weekly for subsequent weeks.    5. [place] method utilized to dose vancomycin therapy: Method 1    Ottoniel Steele [facility]

## 2019-09-28 NOTE — H&P
[facility]    History and Physical - Hospitalist Service       Date of Admission:  9/27/2019    Assessment & Plan   Andrew Alvarez is a 68 year old male admitted on 9/27/2019. He presents with worsening pain and swelling of left index finger and hand.  Injured with either a bite or cut 5 days ago while apple picking.  Seen 2 days ago and treated for early abscess with a dose of Ancef and has been taking Keflex.  Over the past 2 days with increasing pain and swelling associate with fever and chills at home.  On exam is afebrile with swelling and pain involving the index finger and in a carbuncle over the dorsum of the PIP knuckle left index finger.  There is redness extending up the forearm.  White blood cell count is elevated, he is underlying well-controlled diabetes.  Patient will be admitted inpatient status and treated with IV antibiotics including Zosyn and vancomycin with wound culture and blood cultures pending.  Expect to be in 2 days to develop antibiotic plan.  If worsening, may need orthopedic consult and intervention.    Cellulitis of finger of left hand  History of bite or injury 5 days ago with developing redness and pain over the past 2 days  Seen 2 days ago, started on Keflex after a single dose of Ancef  Increasing pain associate with fever and chills at home  Exam with increased redness and pain with developing small boil on the dorsum of the left index finger  Cultures of wound have been done.  Will admit inpatient status for IV Zosyn and vancomycin  If worsening, may need surgical intervention.    Type 2 diabetes mellitus without complication, without long-term current use of insulin (H)  Controlled at home taking metformin, blood sugars typically in the 150 range  Continue metformin, follow sugars    SHOLA (obstructive sleep apnea)  Uses CPAP for sleep  May use here if available    Cigarette smoker  Daily smoker, about 1/4 pack/day  Declines nicotine  replacement    Chronic hepatitis C without hepatic coma (H)  No current symptoms, treated in 2017  Outpatient follow-up         Diet: Moderate carb diet  DVT Prophylaxis: Low Risk/Ambulatory with no VTE prophylaxis indicated  Winter Catheter: not present  Code Status: Full CODE STATUS    Disposition Plan   Expected discharge: 2 - 3 days, recommended to prior living arrangement once adequate pain management/ tolerating PO medications and antibiotic plan established.  Entered: Scottie Escobar MD 09/27/2019, 11:42 PM     The patient's care was discussed with the Patient and Patient's Family.    Scottie Escobar MD  [facility]    ______________________________________________________________________    Chief Complaint   68-year-old male with increasing pain and swelling of left hand and finger    History is obtained from the patient, electronic health record, emergency department physician and patient's spouse    History of Present Illness   Andrew Alvarez is a 68 year old male who comes to the ER with increasing pain and swelling of left index finger.  He was apple picking about 5 days ago when he states he was either bit by some bug, spider or possibly was poked by the pale.  By Wednesday was having increasing pain and swelling and was seen in the ED.  There was concern of possible infection and he was given a dose of Ancef as well as continuing oral Keflex as an outpatient.  The past day he has had increasing fatigue, sleeping more with general malaise and having fever and chills with temperature as high as 99 degrees.  He complains of increasing pain involving the left index finger with radiation of the pain into the hand wrist.  Today noted increased redness up into the forearm.  He has not had infection problems in the past.  Patient is diabetic, taking metformin with good control of his sugars.  He has a history of hep C, treated 2 years ago with no sequelae.  Today he  notes a small blister/boil on the dorsum of the PIP knuckle of that index finger.  Denies nausea or vomiting, difficulty breathing, cough,    Review of Systems    The 10 point Review of Systems is negative other than noted in the HPI or here.     Past Medical History    I have reviewed this patient's medical history and updated it with pertinent information if needed.   Past Medical History:   Diagnosis Date     DM2 (diabetes mellitus, type 2) (H)      Hepatitis C carrier (H)      SHOLA on CPAP        Past Surgical History   I have reviewed this patient's surgical history and updated it with pertinent information if needed.  Past Surgical History:   Procedure Laterality Date     SHOULDER SURGERY  2000     TONSILLECTOMY & ADENOIDECTOMY  1990       Social History   I have reviewed this patient's social history and updated it with pertinent information if needed.  Social History     Tobacco Use     Smoking status: Current Every Day Smoker     Packs/day: 0.25   Substance Use Topics     Alcohol use: Not Currently     Drug use: Not on file       Family History   I have reviewed this patient's family history and updated it with pertinent information if needed.   Family History   Problem Relation Age of Onset     Diabetes Mother      Heart Disease Father      Cerebrovascular Disease Father         hx of stroke       Prior to Admission Medications   Prior to Admission Medications   Prescriptions Last Dose Informant Patient Reported? Taking?   aspirin 81 MG EC tablet 9/27/2019 at 0800  Yes Yes   Sig: Take 81 mg by mouth daily   calcium carbonate (CALCIUM CARBONATE) 600 MG tablet 9/27/2019 at 0800  Yes Yes   Sig: Take 600 mg by mouth daily   cephALEXin (KEFLEX) 500 MG capsule 9/27/2019 at 1600  No Yes   Sig: Take 1 capsule (500 mg) by mouth 4 times daily for 10 days   cetirizine (ZYRTEC) 5 MG tablet 9/27/2019 at 0800  Yes Yes   Sig: Take 5 mg by mouth daily   metFORMIN (GLUCOPHAGE) 500 MG tablet 9/27/2019 at 0800  Yes Yes   Sig:  Take 500 mg by mouth daily (with breakfast)   metFORMIN (GLUCOPHAGE) 500 MG tablet 9/26/2019 at 2200  Yes Yes   Sig: Take 1,000 mg by mouth At Bedtime   oxyCODONE (ROXICODONE) 5 MG tablet 9/26/2019 at 2200  No Yes   Sig: Take 1 tablet (5 mg) by mouth every 6 hours as needed for pain      Facility-Administered Medications: None     Allergies   No Known Allergies    Physical Exam   Vital Signs: Temp: 97  F (36.1  C) Temp src: Temporal BP: (!) 132/95 Pulse: 84   Resp: 20 SpO2: 99 % O2 Device: None (Room air)    Weight: 0 lbs 0 oz    Constitutional: awake, alert, cooperative, no apparent distress, and appears stated age  Eyes: Lids and lashes normal, pupils equal, round and reactive to light, extra ocular muscles intact, sclera clear, conjunctiva normal  ENT: Normocephalic, without obvious abnormality, atraumatic, sinuses nontender on palpation, external ears without lesions, oral pharynx with moist mucous membranes, tonsils without erythema or exudates, gums normal and good dentition.  Hematologic / Lymphatic: no cervical lymphadenopathy and no supraclavicular lymphadenopathy  Respiratory: No increased work of breathing, good air exchange, clear to auscultation bilaterally, no crackles or wheezing  Cardiovascular: regular rate and rhythm  GI: normal bowel sounds, soft, non-distended and non-tender  Skin: Left index finger is swollen with a small blister/boil on the dorsum of the left PIP knuckle.  The finger is reddened with redness extending into the hand and just up beyond the wrist.  There is a sense of some slight redness and warmth up into the forearm, not extending past the elbow.  Musculoskeletal: Swelling and pain involving the index finger with pain with movement of the finger and the hand.  Wrist is unremarkable and elbow shows full motion without pain.  Neurologic: Awake, alert, oriented to name, place and time.  Cranial nerves II-XII are grossly intact.  Motor is 5 out of 5 bilaterally.      Data   Data  reviewed today: I reviewed all medications, new labs and imaging results over the last 24 hours. I personally reviewed no images or EKG's today.    Results for orders placed or performed during the hospital encounter of 09/27/19   CBC with platelets differential   Result Value Ref Range    WBC 13.8 (H) 4.0 - 11.0 10e9/L    RBC Count 4.55 4.4 - 5.9 10e12/L    Hemoglobin 14.3 13.3 - 17.7 g/dL    Hematocrit 41.9 40.0 - 53.0 %    MCV 92 78 - 100 fl    MCH 31.4 26.5 - 33.0 pg    MCHC 34.1 31.5 - 36.5 g/dL    RDW 13.7 10.0 - 15.0 %    Platelet Count 203 150 - 450 10e9/L    Diff Method Automated Method     % Neutrophils 75.3 %    % Lymphocytes 16.0 %    % Monocytes 6.5 %    % Eosinophils 1.4 %    % Basophils 0.4 %    % Immature Granulocytes 0.4 %    Nucleated RBCs 0 0 /100    Absolute Neutrophil 10.4 (H) 1.6 - 8.3 10e9/L    Absolute Lymphocytes 2.2 0.8 - 5.3 10e9/L    Absolute Monocytes 0.9 0.0 - 1.3 10e9/L    Absolute Basophils 0.1 0.0 - 0.2 10e9/L    Abs Immature Granulocytes 0.1 0 - 0.4 10e9/L    Absolute Nucleated RBC 0.0    Comprehensive metabolic panel   Result Value Ref Range    Sodium 142 133 - 144 mmol/L    Potassium 3.5 3.4 - 5.3 mmol/L    Chloride 110 (H) 94 - 109 mmol/L    Carbon Dioxide 24 20 - 32 mmol/L    Anion Gap 8 3 - 14 mmol/L    Glucose 132 (H) 70 - 99 mg/dL    Urea Nitrogen 21 7 - 30 mg/dL    Creatinine 0.75 0.66 - 1.25 mg/dL    GFR Estimate >90 >60 mL/min/[1.73_m2]    GFR Estimate If Black >90 >60 mL/min/[1.73_m2]    Calcium 8.3 (L) 8.5 - 10.1 mg/dL    Bilirubin Total 0.3 0.2 - 1.3 mg/dL    Albumin 3.2 (L) 3.4 - 5.0 g/dL    Protein Total 7.4 6.8 - 8.8 g/dL    Alkaline Phosphatase 69 40 - 150 U/L    ALT 18 0 - 70 U/L    AST 11 0 - 45 U/L   Lactic acid whole blood   Result Value Ref Range    Lactic Acid 0.8 0.7 - 2.0 mmol/L   CRP inflammation   Result Value Ref Range    CRP Inflammation 54.6 (H) 0.0 - 8.0 mg/L   Gram stain   Result Value Ref Range    Specimen Description Wound     Gram Stain No  organisms seen     Gram Stain       Gram stain result is preliminary and awaits review of Microbiology Staff.

## 2019-09-28 NOTE — PROGRESS NOTES
S-(situation): Patient arrives to room 257 via cart from ED    B-(background): left index finger infection    A-(assessment): redness on right arm outlined. Left hand and lower arm swollen    R-(recommendations): Orders reviewed with pt. Will monitor patient per MD orders. Monitor skin redness, give antibiotics, monitor labs and vs    Inpatient nursing criteria listed below were met:    Health care directives status obtained and documented: Yes  SCD's Documented: No  Ambulation ordered  Skin issues/needs documented:Yes  Isolation needs addressed, if appropriate: NA  Fall Prevention: Care plan updated, Education given and documented Yes  MRSA swab completed for patient 55 years and older (exclude LUIS and TKA): Yes  Care Plan initiated: Yes  Education Assessment documented:Yes  Education Documented (Pre-existing chronic infection such as, MRSA/VRE need education on admission): Yes  Admission Medication Reconciliation completed: Yes  New medication patient education completed and documented (Possible Side Effects of Common Medications handout): Yes  Home medications if not able to send immediately home with family stored here: NA  Reminder note placed in discharge instructions: NA  Discharge planning review completed (admission navigator) Yes

## 2019-09-28 NOTE — ED NOTES
ED Nursing criteria listed below was addressed during verbal handoff:     Abnormal vitals: Yes  Abnormal results: Yes  Med Reconciliation completed: Yes  Meds given in ED: Yes  Any Overdue Meds: Yes  Core Measures: N/A  Isolation: N/A  Special needs: N/A  Skin assessment: Yes    Observation Patient  Education provided: N/A

## 2019-09-28 NOTE — PROGRESS NOTES
VSS.  Received oxycodone after arriving to Med/Surg then was able to rest.  States pain/throbbing in left hand is now less.  IVF and antibiotics continue.  Up independently in room.

## 2019-09-28 NOTE — PROGRESS NOTES
[facility]    Medicine Progress Note - Hospitalist Service       Date of Admission:  9/27/2019  Assessment & Plan     Andrew Alvarez is a 68 year old male admitted on 9/27/2019. He presents with worsening pain and swelling of left index finger and hand.  Injured with either a bite or cut 5 days ago while apple picking.  Seen 2 days ago and treated for early abscess with a dose of Ancef and has been taking Keflex.  Over the past 2 days with increasing pain and swelling associate with fever and chills at home.  On exam is afebrile with swelling and pain involving the index finger and in a carbuncle over the dorsum of the PIP knuckle left index finger.  There is redness extending up the forearm.  White blood cell count is elevated, he is underlying well-controlled diabetes.  Patient will be admitted inpatient status and treated with IV antibiotics including Zosyn and vancomycin with wound culture and blood cultures pending.  Expect to be in 2 days to develop antibiotic plan.  If worsening, may need orthopedic consult and intervention.    Cellulitis of finger of left hand  Assessment: History of bite or injury 5 days ago with developing redness and pain over the past 2 days. Seen 2 days ago, started on Keflex after a single dose of Ancef. Increasing pain associate with fever and chills at home. Exam with increased redness and pain with developing small boil on the dorsum of the left index finger. Cultures of wound have been done.  Will admit inpatient status for IV Zosyn and vancomycin. 9/28: Patient states his finger has not worsened but not improved significantly. The redness moving up his arm has improved.  Patient has been afebrile and hemodynamically stable.   Plan: Continue IV Zosyn and Vancomycin. Continue to monitor closely. Recheck labs tomorrow. Will consult ortho for tomorrow. Wound culture pending.     Type 2 diabetes mellitus without complication, without long-term  current use of insulin (H)  Assessment: Controlled at home taking metformin, blood sugars typically in the 150 range.   Plan: Continue metformin. Continue to monitor blood glucose.     SHOLA (obstructive sleep apnea)  Assessment: Uses CPAP for sleep.  Plan: Continue home CPAP    Cigarette smoker  Assessment: Daily smoker, about 1/4 pack/day  Plan: Declines nicotine replacement    Chronic hepatitis C without hepatic coma (H)  Assessment: No current symptoms, treated in 2017  Plan: Outpatient follow-up       Diet: Combination Diet Regular Diet Adult; 3639-3544 Calories: Moderate Consistent CHO (4-6 CHO units/meal)    DVT Prophylaxis: Ambulate every shift  Winter Catheter: not present  Code Status: Full Code      Disposition Plan   Expected discharge: 2 - 3 days, recommended to prior living arrangement once adequate pain management/ tolerating PO medications, antibiotic plan established and hemodynamically stable medically ready for discharge. .  Entered: Kayli Wong CNP 09/28/2019, 12:26 PM       The patient's care was discussed with the Attending Physician, Dr. Anguiano, Bedside Nurse, Patient and Patient's Family.    Kayli Wong CNP  Hospitalist Service  [facility]  ______________________________________________________________________    Interval History   Patient seen while up walking. Alert and oriented. Patient erythema improved on hand/arm. Patient finger remains red and swollen with drainage. Patient pain under control. Voiding independently. Afebrile and hemodynamically stable.  10 point ROS neg other than the symptoms noted above     Data reviewed today: I reviewed all medications, new labs and imaging results over the last 24 hours.       Physical Exam   Vital Signs: Temp: 96.6  F (35.9  C) Temp src: Oral BP: 115/65 Pulse: 69 Heart Rate: 82 Resp: 18 SpO2: 92 % O2 Device: None (Room air)    Weight: 197 lbs 9.6 oz    Constitutional: awake, alert, cooperative, no  apparent distress, and appears stated age  Eyes: Lids and lashes normal, pupils equal, round and reactive to light, extra ocular muscles intact, sclera clear, conjunctiva normal  Respiratory: No increased work of breathing, good air exchange, clear to auscultation bilaterally, no crackles or wheezing  Cardiovascular: regular rate and rhythm  GI: normal bowel sounds, soft, non-distended and non-tender  Skin: Left index finger is swollen with a small blister/boil on the dorsum of the left PIP knuckle.  The finger is reddened with redness extending into the hand.    Musculoskeletal: Swelling and pain involving the index finger with pain with movement of the finger and the hand.  Wrist is unremarkable and elbow shows full motion without pain.  Neurologic: Awake, alert, oriented to name, place and time.  Cranial nerves II-XII are grossly intact.  Motor is 5 out of 5 bilaterally.      Data   Recent Labs   Lab 09/28/19  0529 09/27/19  2316 09/25/19  1840   WBC 11.2* 13.8* 19.0*   HGB 13.5 14.3 15.7   MCV 92 92 91    203 204   * 142 143   POTASSIUM 3.6 3.5 3.8   CHLORIDE 114* 110* 111*   CO2 23 24 24   BUN 19 21 13   CR 0.83 0.75 0.76   ANIONGAP 8 8 8   VICKI 8.2* 8.3* 8.9   * 132* 122*   ALBUMIN  --  3.2*  --    PROTTOTAL  --  7.4  --    BILITOTAL  --  0.3  --    ALKPHOS  --  69  --    ALT  --  18  --    AST  --  11  --

## 2019-09-28 NOTE — ASSESSMENT & PLAN NOTE
History of bite or injury 5 days ago with developing redness and pain over the past 2 days  Seen 2 days ago, started on Keflex after a single dose of Ancef  Increasing pain associate with fever and chills at home  Exam with increased redness and pain with developing small boil on the dorsum of the left index finger  Cultures of wound have been done.  Will admit inpatient status for IV Zosyn and vancomycin  If worsening, may need surgical intervention.

## 2019-09-28 NOTE — ASSESSMENT & PLAN NOTE
Controlled at home taking metformin, blood sugars typically in the 150 range  Continue metformin, follow sugars

## 2019-09-29 ENCOUNTER — APPOINTMENT (OUTPATIENT)
Dept: MRI IMAGING | Facility: CLINIC | Age: 69
DRG: 854 | End: 2019-09-29
Attending: NURSE PRACTITIONER
Payer: MEDICARE

## 2019-09-29 ENCOUNTER — APPOINTMENT (OUTPATIENT)
Dept: GENERAL RADIOLOGY | Facility: CLINIC | Age: 69
DRG: 854 | End: 2019-09-29
Attending: NURSE PRACTITIONER
Payer: MEDICARE

## 2019-09-29 ENCOUNTER — ANESTHESIA (OUTPATIENT)
Dept: SURGERY | Facility: CLINIC | Age: 69
DRG: 854 | End: 2019-09-29
Payer: MEDICARE

## 2019-09-29 ENCOUNTER — ANESTHESIA EVENT (OUTPATIENT)
Dept: SURGERY | Facility: CLINIC | Age: 69
DRG: 854 | End: 2019-09-29
Payer: MEDICARE

## 2019-09-29 PROBLEM — L08.9 INSECT BITE OF HAND WITH INFECTION: Status: ACTIVE | Noted: 2019-09-29

## 2019-09-29 PROBLEM — S60.569A INSECT BITE OF HAND WITH INFECTION: Status: ACTIVE | Noted: 2019-09-29
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
## 2019-09-29 LAB
ANION GAP SERPL CALCULATED.3IONS-SCNC: 7 MMOL/L (ref 3–14)
BACTERIA SPEC CULT: NORMAL
BUN SERPL-MCNC: 21 MG/DL (ref 7–30)
CALCIUM SERPL-MCNC: 8.1 MG/DL (ref 8.5–10.1)
CHLORIDE SERPL-SCNC: 112 MMOL/L (ref 94–109)
CO2 SERPL-SCNC: 26 MMOL/L (ref 20–32)
CREAT SERPL-MCNC: 0.94 MG/DL (ref 0.66–1.25)
CRP SERPL-MCNC: 30.3 MG/L (ref 0–8)
ERYTHROCYTE [DISTWIDTH] IN BLOOD BY AUTOMATED COUNT: 13.8 % (ref 10–15)
GFR SERPL CREATININE-BSD FRML MDRD: 83 ML/MIN/{1.73_M2}
GLUCOSE BLDC GLUCOMTR-MCNC: 101 MG/DL (ref 70–99)
GLUCOSE BLDC GLUCOMTR-MCNC: 110 MG/DL (ref 70–99)
GLUCOSE BLDC GLUCOMTR-MCNC: 137 MG/DL (ref 70–99)
GLUCOSE BLDC GLUCOMTR-MCNC: 147 MG/DL (ref 70–99)
GLUCOSE SERPL-MCNC: 129 MG/DL (ref 70–99)
HCT VFR BLD AUTO: 37.2 % (ref 40–53)
HGB BLD-MCNC: 12.3 G/DL (ref 13.3–17.7)
MCH RBC QN AUTO: 31.1 PG (ref 26.5–33)
MCHC RBC AUTO-ENTMCNC: 33.1 G/DL (ref 31.5–36.5)
MCV RBC AUTO: 94 FL (ref 78–100)
PLATELET # BLD AUTO: 178 10E9/L (ref 150–450)
POTASSIUM SERPL-SCNC: 4.2 MMOL/L (ref 3.4–5.3)
RBC # BLD AUTO: 3.95 10E12/L (ref 4.4–5.9)
SODIUM SERPL-SCNC: 145 MMOL/L (ref 133–144)
SPECIMEN SOURCE: NORMAL
WBC # BLD AUTO: 9 10E9/L (ref 4–11)

## 2019-09-29 PROCEDURE — 86140 C-REACTIVE PROTEIN: CPT | Performed by: NURSE PRACTITIONER

## 2019-09-29 PROCEDURE — 25000128 H RX IP 250 OP 636: Performed by: FAMILY MEDICINE

## 2019-09-29 PROCEDURE — 0JBK0ZZ EXCISION OF LEFT HAND SUBCUTANEOUS TISSUE AND FASCIA, OPEN APPROACH: ICD-10-PCS | Performed by: ORTHOPAEDIC SURGERY

## 2019-09-29 PROCEDURE — 37000009 ZZH ANESTHESIA TECHNICAL FEE, EACH ADDTL 15 MIN: Performed by: ORTHOPAEDIC SURGERY

## 2019-09-29 PROCEDURE — 99233 SBSQ HOSP IP/OBS HIGH 50: CPT | Performed by: NURSE PRACTITIONER

## 2019-09-29 PROCEDURE — 00000146 ZZHCL STATISTIC GLUCOSE BY METER IP

## 2019-09-29 PROCEDURE — 25000128 H RX IP 250 OP 636: Performed by: ORTHOPAEDIC SURGERY

## 2019-09-29 PROCEDURE — 10061 I&D ABSCESS COMP/MULTIPLE: CPT | Mod: 57 | Performed by: ORTHOPAEDIC SURGERY

## 2019-09-29 PROCEDURE — A9585 GADOBUTROL INJECTION: HCPCS | Performed by: FAMILY MEDICINE

## 2019-09-29 PROCEDURE — 36415 COLL VENOUS BLD VENIPUNCTURE: CPT | Performed by: NURSE PRACTITIONER

## 2019-09-29 PROCEDURE — 37000008 ZZH ANESTHESIA TECHNICAL FEE, 1ST 30 MIN: Performed by: ORTHOPAEDIC SURGERY

## 2019-09-29 PROCEDURE — 25000125 ZZHC RX 250: Performed by: NURSE ANESTHETIST, CERTIFIED REGISTERED

## 2019-09-29 PROCEDURE — 87070 CULTURE OTHR SPECIMN AEROBIC: CPT | Performed by: ORTHOPAEDIC SURGERY

## 2019-09-29 PROCEDURE — 25500064 ZZH RX 255 OP 636: Performed by: FAMILY MEDICINE

## 2019-09-29 PROCEDURE — 80048 BASIC METABOLIC PNL TOTAL CA: CPT | Performed by: NURSE PRACTITIONER

## 2019-09-29 PROCEDURE — 11042 DBRDMT SUBQ TIS 1ST 20SQCM/<: CPT | Mod: 59 | Performed by: ORTHOPAEDIC SURGERY

## 2019-09-29 PROCEDURE — 25800029 ZZH RX IP 258 OP 250: Performed by: NURSE ANESTHETIST, CERTIFIED REGISTERED

## 2019-09-29 PROCEDURE — 71045 X-RAY EXAM CHEST 1 VIEW: CPT | Mod: TC

## 2019-09-29 PROCEDURE — 25800030 ZZH RX IP 258 OP 636: Performed by: FAMILY MEDICINE

## 2019-09-29 PROCEDURE — 25000132 ZZH RX MED GY IP 250 OP 250 PS 637: Performed by: ORTHOPAEDIC SURGERY

## 2019-09-29 PROCEDURE — 85027 COMPLETE CBC AUTOMATED: CPT | Performed by: NURSE PRACTITIONER

## 2019-09-29 PROCEDURE — 36000050 ZZH SURGERY LEVEL 2 1ST 30 MIN: Performed by: ORTHOPAEDIC SURGERY

## 2019-09-29 PROCEDURE — 27210794 ZZH OR GENERAL SUPPLY STERILE: Performed by: ORTHOPAEDIC SURGERY

## 2019-09-29 PROCEDURE — 25000566 ZZH SEVOFLURANE, EA 15 MIN: Performed by: ORTHOPAEDIC SURGERY

## 2019-09-29 PROCEDURE — 87075 CULTR BACTERIA EXCEPT BLOOD: CPT | Performed by: ORTHOPAEDIC SURGERY

## 2019-09-29 PROCEDURE — 25800030 ZZH RX IP 258 OP 636: Performed by: ORTHOPAEDIC SURGERY

## 2019-09-29 PROCEDURE — 73223 MRI JOINT UPR EXTR W/O&W/DYE: CPT | Mod: LT

## 2019-09-29 PROCEDURE — 0H9GXZZ DRAINAGE OF LEFT HAND SKIN, EXTERNAL APPROACH: ICD-10-PCS | Performed by: ORTHOPAEDIC SURGERY

## 2019-09-29 PROCEDURE — 25000128 H RX IP 250 OP 636: Performed by: NURSE PRACTITIONER

## 2019-09-29 PROCEDURE — 36000052 ZZH SURGERY LEVEL 2 EA 15 ADDTL MIN: Performed by: ORTHOPAEDIC SURGERY

## 2019-09-29 PROCEDURE — 99232 SBSQ HOSP IP/OBS MODERATE 35: CPT | Mod: 25 | Performed by: ORTHOPAEDIC SURGERY

## 2019-09-29 PROCEDURE — 71000014 ZZH RECOVERY PHASE 1 LEVEL 2 FIRST HR: Performed by: ORTHOPAEDIC SURGERY

## 2019-09-29 PROCEDURE — 12000000 ZZH R&B MED SURG/OB

## 2019-09-29 PROCEDURE — 25000128 H RX IP 250 OP 636: Performed by: NURSE ANESTHETIST, CERTIFIED REGISTERED

## 2019-09-29 RX ORDER — NALOXONE HYDROCHLORIDE 0.4 MG/ML
.1-.4 INJECTION, SOLUTION INTRAMUSCULAR; INTRAVENOUS; SUBCUTANEOUS
Status: DISCONTINUED | OUTPATIENT
Start: 2019-09-29 | End: 2019-10-01 | Stop reason: HOSPADM

## 2019-09-29 RX ORDER — HYDROMORPHONE HYDROCHLORIDE 1 MG/ML
.3-.5 INJECTION, SOLUTION INTRAMUSCULAR; INTRAVENOUS; SUBCUTANEOUS EVERY 5 MIN PRN
Status: DISCONTINUED | OUTPATIENT
Start: 2019-09-29 | End: 2019-09-29

## 2019-09-29 RX ORDER — SODIUM CHLORIDE, SODIUM LACTATE, POTASSIUM CHLORIDE, CALCIUM CHLORIDE 600; 310; 30; 20 MG/100ML; MG/100ML; MG/100ML; MG/100ML
INJECTION, SOLUTION INTRAVENOUS CONTINUOUS
Status: DISCONTINUED | OUTPATIENT
Start: 2019-09-29 | End: 2019-09-29

## 2019-09-29 RX ORDER — FENTANYL CITRATE 50 UG/ML
INJECTION, SOLUTION INTRAMUSCULAR; INTRAVENOUS PRN
Status: DISCONTINUED | OUTPATIENT
Start: 2019-09-29 | End: 2019-09-29

## 2019-09-29 RX ORDER — GADOBUTROL 604.72 MG/ML
0.1 INJECTION INTRAVENOUS ONCE
Status: COMPLETED | OUTPATIENT
Start: 2019-09-29 | End: 2019-09-29

## 2019-09-29 RX ORDER — NALOXONE HYDROCHLORIDE 0.4 MG/ML
.1-.4 INJECTION, SOLUTION INTRAMUSCULAR; INTRAVENOUS; SUBCUTANEOUS
Status: DISCONTINUED | OUTPATIENT
Start: 2019-09-29 | End: 2019-09-30

## 2019-09-29 RX ORDER — DIMENHYDRINATE 50 MG/ML
25 INJECTION, SOLUTION INTRAMUSCULAR; INTRAVENOUS
Status: DISCONTINUED | OUTPATIENT
Start: 2019-09-29 | End: 2019-09-29

## 2019-09-29 RX ORDER — HYDROMORPHONE HYDROCHLORIDE 1 MG/ML
0.5 INJECTION, SOLUTION INTRAMUSCULAR; INTRAVENOUS; SUBCUTANEOUS EVERY 4 HOURS PRN
Status: DISCONTINUED | OUTPATIENT
Start: 2019-09-29 | End: 2019-10-01 | Stop reason: HOSPADM

## 2019-09-29 RX ORDER — ONDANSETRON 2 MG/ML
4 INJECTION INTRAMUSCULAR; INTRAVENOUS EVERY 30 MIN PRN
Status: DISCONTINUED | OUTPATIENT
Start: 2019-09-29 | End: 2019-09-29

## 2019-09-29 RX ORDER — ONDANSETRON 4 MG/1
4 TABLET, ORALLY DISINTEGRATING ORAL EVERY 30 MIN PRN
Status: DISCONTINUED | OUTPATIENT
Start: 2019-09-29 | End: 2019-09-29

## 2019-09-29 RX ORDER — ONDANSETRON 2 MG/ML
INJECTION INTRAMUSCULAR; INTRAVENOUS PRN
Status: DISCONTINUED | OUTPATIENT
Start: 2019-09-29 | End: 2019-09-29

## 2019-09-29 RX ORDER — LIDOCAINE HYDROCHLORIDE 20 MG/ML
INJECTION, SOLUTION INFILTRATION; PERINEURAL PRN
Status: DISCONTINUED | OUTPATIENT
Start: 2019-09-29 | End: 2019-09-29

## 2019-09-29 RX ORDER — PROPOFOL 10 MG/ML
INJECTION, EMULSION INTRAVENOUS PRN
Status: DISCONTINUED | OUTPATIENT
Start: 2019-09-29 | End: 2019-09-29

## 2019-09-29 RX ORDER — LIDOCAINE 40 MG/G
CREAM TOPICAL
Status: DISCONTINUED | OUTPATIENT
Start: 2019-09-29 | End: 2019-10-01 | Stop reason: HOSPADM

## 2019-09-29 RX ORDER — FENTANYL CITRATE 50 UG/ML
25-50 INJECTION, SOLUTION INTRAMUSCULAR; INTRAVENOUS
Status: DISCONTINUED | OUTPATIENT
Start: 2019-09-29 | End: 2019-09-29

## 2019-09-29 RX ORDER — SODIUM CHLORIDE 450 MG/100ML
INJECTION, SOLUTION INTRAVENOUS CONTINUOUS PRN
Status: DISCONTINUED | OUTPATIENT
Start: 2019-09-29 | End: 2019-09-29

## 2019-09-29 RX ADMIN — HYDROMORPHONE HYDROCHLORIDE 0.5 MG: 1 INJECTION, SOLUTION INTRAMUSCULAR; INTRAVENOUS; SUBCUTANEOUS at 15:05

## 2019-09-29 RX ADMIN — OXYCODONE HYDROCHLORIDE 5 MG: 5 TABLET ORAL at 15:50

## 2019-09-29 RX ADMIN — TAZOBACTAM SODIUM AND PIPERACILLIN SODIUM 3.38 G: 375; 3 INJECTION, SOLUTION INTRAVENOUS at 20:03

## 2019-09-29 RX ADMIN — PROPOFOL 200 MG: 10 INJECTION, EMULSION INTRAVENOUS at 13:31

## 2019-09-29 RX ADMIN — LIDOCAINE HYDROCHLORIDE 80 MG: 20 INJECTION, SOLUTION INFILTRATION; PERINEURAL at 13:31

## 2019-09-29 RX ADMIN — ONDANSETRON 4 MG: 2 INJECTION INTRAMUSCULAR; INTRAVENOUS at 13:38

## 2019-09-29 RX ADMIN — GADOBUTROL 9 ML: 604.72 INJECTION INTRAVENOUS at 09:53

## 2019-09-29 RX ADMIN — SODIUM CHLORIDE: 4.5 INJECTION, SOLUTION INTRAVENOUS at 13:09

## 2019-09-29 RX ADMIN — IBUPROFEN 600 MG: 600 TABLET ORAL at 19:56

## 2019-09-29 RX ADMIN — FENTANYL CITRATE 50 MCG: 50 INJECTION, SOLUTION INTRAMUSCULAR; INTRAVENOUS at 13:25

## 2019-09-29 RX ADMIN — METFORMIN HYDROCHLORIDE 1000 MG: 500 TABLET, FILM COATED ORAL at 17:05

## 2019-09-29 RX ADMIN — VANCOMYCIN HYDROCHLORIDE 1500 MG: 1 INJECTION, POWDER, LYOPHILIZED, FOR SOLUTION INTRAVENOUS at 11:36

## 2019-09-29 RX ADMIN — HYDROMORPHONE HYDROCHLORIDE 0.5 MG: 1 INJECTION, SOLUTION INTRAMUSCULAR; INTRAVENOUS; SUBCUTANEOUS at 11:24

## 2019-09-29 RX ADMIN — TAZOBACTAM SODIUM AND PIPERACILLIN SODIUM 3.38 G: 375; 3 INJECTION, SOLUTION INTRAVENOUS at 01:15

## 2019-09-29 RX ADMIN — VANCOMYCIN HYDROCHLORIDE 1500 MG: 1 INJECTION, POWDER, LYOPHILIZED, FOR SOLUTION INTRAVENOUS at 22:50

## 2019-09-29 RX ADMIN — TAZOBACTAM SODIUM AND PIPERACILLIN SODIUM 3.38 G: 375; 3 INJECTION, SOLUTION INTRAVENOUS at 08:01

## 2019-09-29 RX ADMIN — TAZOBACTAM SODIUM AND PIPERACILLIN SODIUM 3.38 G: 375; 3 INJECTION, SOLUTION INTRAVENOUS at 13:24

## 2019-09-29 RX ADMIN — HYDROMORPHONE HYDROCHLORIDE 0.5 MG: 1 INJECTION, SOLUTION INTRAMUSCULAR; INTRAVENOUS; SUBCUTANEOUS at 19:56

## 2019-09-29 RX ADMIN — FENTANYL CITRATE 50 MCG: 50 INJECTION, SOLUTION INTRAMUSCULAR; INTRAVENOUS at 13:50

## 2019-09-29 RX ADMIN — OXYCODONE HYDROCHLORIDE 5 MG: 5 TABLET ORAL at 17:13

## 2019-09-29 RX ADMIN — OXYCODONE HYDROCHLORIDE 10 MG: 5 TABLET ORAL at 22:03

## 2019-09-29 ASSESSMENT — ACTIVITIES OF DAILY LIVING (ADL)
ADLS_ACUITY_SCORE: 10

## 2019-09-29 ASSESSMENT — LIFESTYLE VARIABLES: TOBACCO_USE: 1

## 2019-09-29 NOTE — ANESTHESIA CARE TRANSFER NOTE
Patient: Andrew Alvarez    Procedure(s):  Irrigationa and Debridement Left Second Finger Wound    Diagnosis: Open wound of left forearm, initial encounter [S51.802A]  Diagnosis Additional Information: No value filed.    Anesthesia Type:   General, LMA     Note:  Anesthesia Care Transfer Notewriter    Vitals: (Last set prior to Anesthesia Care Transfer)    CRNA VITALS  9/29/2019 1349 - 9/29/2019 1427      9/29/2019             Pulse:  84    SpO2:  99 %                Electronically Signed By: MARCO A Duncan CRNA  September 29, 2019  2:27 PM

## 2019-09-29 NOTE — PROGRESS NOTES
Transfer from pacu to Room 257  Transferred to bed via A-1   S: 69 y/o M S/P Left finger I&D  Anesthesia Type: General  Surgeon: Dr. Jordan  Allergies: See Medication Reconciliation Record  DNR: No  B: Pertinent Medical History:   Past Medical History:   Diagnosis Date     DM2 (diabetes mellitus, type 2) (H)      Hepatitis C carrier (H)      SHOLA on CPAP    Surgical History:   Past Surgical History:   Procedure Laterality Date     SHOULDER SURGERY  2000     TONSILLECTOMY & ADENOIDECTOMY  1990   A: EBL: minimal  IVF: 500 ml  BGM: 110 at 1440  UOP: none  NPO: ___Yes __X_No   Vomiting: ___Yes _X__No   Drainage: none  Skin Integrity: Intact except left finger incision   RFO: ___Yes___No x2 incisions with packing   Brace/sling/equipment: ___Yes_X__No  See PACU record for ongoing assessment, vital signs and pain assessment.  R: Post-Op vitals and assessments as ordered/indicated per patient's condition.  Follow Post-Op orders and notify Physician prn.  Continue to involve patient/family in plan of care and discharge planning.  Reinforce Pre-Operative education.  Implement skin safety interventions as appropriate.  Name:Kassidy GAGNON RN, report given to DAT Rocha

## 2019-09-29 NOTE — CONSULTS
[facility] Orthopedic Consultation    Andrew Alvarez MRN# [id number]   Age: 68 year old YOB: 1950     Date of Admission:  9/27/2019    Reason for consult: Left hand infection.       Requesting physician: Dr. Scottie Escobar        Level of consult: Consult, follow and place orders           Assessment and Plan:   Assessment:   Left index finger and hand infection from likely sting about 5 days ago.  He is developing abscess and has increased pressure in the finger.  I feel this requires I+D.  No growth on cultures.      Plan:   Incision and drainage of left index finger and hand infection.  Risks, benefits, potential complications and alternatives were discussed.  We will obtain cultures, although he has been on antibiotics for several days.            Chief Complaint:   Left index finger and hand infection.       History is obtained from the patient         History of Present Illness:   This patient is a 68 year old male who presents with the following condition requiring a hospital admission:      He was picking up fallen rotten apples 5 days ago when something stung his finger.  It got steadily worse and he was admitted for IV antibiotics 2/27/19.  Cultures from then are negative.  Cellulitis of the forearm has resolved on vancomycin and Zosyn, but the finger swelling has increased.  Sting was between PIP and metacarpal phalangeal joint, but tense swelling now extends proximal to metacarpal phalangeal joint.           Past Medical History:     Past Medical History:   Diagnosis Date     DM2 (diabetes mellitus, type 2) (H)      Hepatitis C carrier (H)      SHOLA on CPAP              Past Surgical History:     Past Surgical History:   Procedure Laterality Date     SHOULDER SURGERY  2000     TONSILLECTOMY & ADENOIDECTOMY  1990             Social History:     Social History     Tobacco Use     Smoking status: Current Every Day Smoker     Packs/day: 0.25   Substance Use Topics      Alcohol use: Not Currently             Family History:     Family History   Problem Relation Age of Onset     Diabetes Mother      Heart Disease Father      Cerebrovascular Disease Father         hx of stroke     Family history reviewed          Immunizations:     There is no immunization history on file for this patient.          Allergies:   No Known Allergies          Medications:     Current Facility-Administered Medications   Medication     acetaminophen (TYLENOL) tablet 650 mg     aspirin EC tablet 81 mg     cetirizine (zyrTEC) solution 5 mg     HYDROmorphone (PF) (DILAUDID) injection 0.5 mg     ibuprofen (ADVIL/MOTRIN) tablet 600 mg     lidocaine (LMX4) kit     lidocaine 1 % 0.1-1 mL     melatonin tablet 1 mg     metFORMIN (GLUCOPHAGE) tablet 1,000 mg     metFORMIN (GLUCOPHAGE) tablet 500 mg     naloxone (NARCAN) injection 0.1-0.4 mg     ondansetron (ZOFRAN-ODT) ODT tab 4 mg    Or     ondansetron (ZOFRAN) injection 4 mg     oxyCODONE (ROXICODONE) tablet 5-10 mg     Patient RECEIVING antibiotic to treat a different condition and it provides ADEQUATE COVERAGE for this surgical procedure.     piperacillin-tazobactam (ZOSYN) infusion 3.375 g     sodium chloride (PF) 0.9% PF flush 3 mL     sodium chloride (PF) 0.9% PF flush 3 mL     sodium chloride (PF) 0.9% PF flush 3 mL     vancomycin (VANCOCIN) 1,500 mg in sodium chloride 0.9 % 250 mL intermittent infusion             Review of Systems:   The Review of Systems is negative other than noted in the HPI          Physical Exam:   Temp: 97.5  F (36.4  C) Temp src: Oral BP: 121/76 Pulse: 72   Resp: 20 SpO2: 94 % O2 Device: None (Room air)    All vitals have been reviewed  Musculoskeletal:   full range of motion noted with exception of left index and long fingers.  He has a sting point dorsally on left index just proximal to PIP.  There is a pustule over the PIP.  Tense swelling extends from PIP to just proximal to metacarpal phalangeal joint.  There appears to be  fluid wave near the metacarpal phalangeal joint.  left hand grasp is weak due to pain and stiffness.  Sensory intact.             Data:   All laboratory data reviewed  Lab Results   Component Value Date    WBC 9.0 09/29/2019    HGB 12.3 (L) 09/29/2019    HCT 37.2 (L) 09/29/2019     09/29/2019     (H) 09/29/2019    POTASSIUM 4.2 09/29/2019    CHLORIDE 112 (H) 09/29/2019    CO2 26 09/29/2019    BUN 21 09/29/2019    CR 0.94 09/29/2019     (H) 09/29/2019    AST 11 09/27/2019    ALT 18 09/27/2019    ALKPHOS 69 09/27/2019    BILITOTAL 0.3 09/27/2019   C-reactive protein  was as high as 54.6.  Cultures negative.    All imaging studies reviewed by me.  MRI shows swelling in soft tissue, but not along tendons.  MR HAND LEFT W/O & W CONTRAST 9/29/2019 9:54 AM     HISTORY:  left finger cellulitis, possible abscess, evaluate for  osteomyelitis     TECHNIQUE: Multiplanar, multisequence MRI of the left hand was  obtained before and after intravenous contrast.     COMPARISON: Radiographs from 9/28/2019     FINDINGS:   There is dorsal soft tissue swelling, most pronounced at the index  finger and to a lesser extent the middle finger. This demonstrates  enhancement on postcontrast sequences compatible with cellulitis.  There is skin blistering dorsal to the second proximal phalanx. There  patchy nonenhancement subjacent to the blister and extending  proximally over the MCP joint; this does not demonstrate simple fluid  signal and may represent a phlegmonous collection or early developing  abscess. There is a 1 cm fluid collection dorsal to the third MCP  joint which may represent a ganglion cyst although cannot exclude a  small abscess.     No abnormal marrow signal to suggest osteomyelitis. Moderate first CMC  joint degenerative changes with cartilage thinning, subchondral cysts,  small osteophytes, and a chronic ossicle. Mild STT joint degenerative  changes. Normal joint alignment is maintained.     No  significant joint effusion. No definite tendon tear.                                                                      IMPRESSION:   1.  Dorsal hand cellulitis, most pronounced at the index finger.  2.  Phlegmon versus early developing abscess along the dorsal index  finger with an overlying blister.   3.  Small fluid collection dorsal to the third MCP joint may represent  a ganglion cyst although cannot exclude a tiny abscess.  4.  No evidence of acute osteomyelitis.  5.  Moderate first CMC and mild STT joint degenerative changes.     Attestation:  I have reviewed today's vital signs, notes, medications, labs and imaging.  Amount of time performed on this consult: 40 minutes.    Jaime Jordan MD

## 2019-09-29 NOTE — ANESTHESIA PREPROCEDURE EVALUATION
Anesthesia Pre-Procedure Evaluation    Patient: Andrew Alvarez   MRN: [id number] : 1950          Preoperative Diagnosis: Open wound of left forearm, initial encounter [S51.683L]    Procedure(s):  Irrigationa and Debridement Left Arm Wound    Past Medical History:   Diagnosis Date     DM2 (diabetes mellitus, type 2) (H)      Hepatitis C carrier (H)      SHOLA on CPAP      Past Surgical History:   Procedure Laterality Date     SHOULDER SURGERY       TONSILLECTOMY & ADENOIDECTOMY         Anesthesia Evaluation     . Pt has had prior anesthetic. Type: General and MAC    No history of anesthetic complications          ROS/MED HX    ENT/Pulmonary:     (+)sleep apnea, tobacco use, Current use uses CPAP , . .    Neurologic:  - neg neurologic ROS     Cardiovascular:  - neg cardiovascular ROS   (+) ----. : . . . :. . Previous cardiac testing date:results:date: results:ECG reviewed date:19 results:SR date: results:          METS/Exercise Tolerance:     Hematologic:  - neg hematologic  ROS       Musculoskeletal:  - neg musculoskeletal ROS       GI/Hepatic:     (+) hepatitis type C, liver disease,       Renal/Genitourinary:         Endo:     (+) type I DM, Last HgA1c: 6.5 date: 19 Not using insulin Normal glucose range: 100-160's- today at 11:30  not previously admitted for DM/DKA .      Psychiatric:  - neg psychiatric ROS       Infectious Disease:  - neg infectious disease ROS       Malignancy:      - no malignancy   Other:    - neg other ROS                      Physical Exam  Normal systems: cardiovascular and pulmonary    Airway   Mallampati: II  TM distance: >3 FB  Neck ROM: full    Dental   (+) upper dentures and partials    Cardiovascular   Rhythm and rate: regular and normal      Pulmonary    breath sounds clear to auscultation            Lab Results   Component Value Date    WBC 9.0 2019    HGB 12.3 (L) 2019    HCT 37.2 (L) 2019     2019    CRP 30.3 (H)  "09/29/2019     (H) 09/29/2019    POTASSIUM 4.2 09/29/2019    CHLORIDE 112 (H) 09/29/2019    CO2 26 09/29/2019    BUN 21 09/29/2019    CR 0.94 09/29/2019     (H) 09/29/2019    VICKI 8.1 (L) 09/29/2019    ALBUMIN 3.2 (L) 09/27/2019    PROTTOTAL 7.4 09/27/2019    ALT 18 09/27/2019    AST 11 09/27/2019    ALKPHOS 69 09/27/2019    BILITOTAL 0.3 09/27/2019       Preop Vitals  BP Readings from Last 3 Encounters:   09/29/19 121/76   09/25/19 125/82    Pulse Readings from Last 3 Encounters:   09/29/19 72   09/25/19 87      Resp Readings from Last 3 Encounters:   09/29/19 20   09/25/19 18    SpO2 Readings from Last 3 Encounters:   09/29/19 94%   09/25/19 97%      Temp Readings from Last 1 Encounters:   09/29/19 97.5  F (36.4  C) (Oral)    Ht Readings from Last 1 Encounters:   09/28/19 1.702 m (5' 7\")      Wt Readings from Last 1 Encounters:   09/28/19 89.6 kg (197 lb 9.6 oz)    Estimated body mass index is 30.95 kg/m  as calculated from the following:    Height as of this encounter: 1.702 m (5' 7\").    Weight as of this encounter: 89.6 kg (197 lb 9.6 oz).       Anesthesia Plan      History & Physical Review  History and physical reviewed and following examination; no interval change.    ASA Status:  2 emergent.    NPO Status:  > 8 hours    Plan for General and LMA with Intravenous and Propofol induction. Maintenance will be Balanced.           Postoperative Care  Postoperative pain management:  IV analgesics and Oral pain medications.      Consents  Anesthetic plan, risks, benefits and alternatives discussed with:  Patient.  Use of blood products discussed: No .   .                 MARCO A Duncan CRNA  "

## 2019-09-29 NOTE — PLAN OF CARE
Received pt back from PACU per bed. Dressing to left hand CDI and moving warm pink fingers slowly. No numbness or tingling. VSS C/o pain in right hand rating 7/10. IV Dilaudid was given with slight relief. Left hand elevated on pillows. Took apple juice, but refused other fluids. IV saline locked per orders. Pt had been taking foods and fluids well yesterday before NPO status. Wife and son at bedside. Report given to Dorothy RN. Will continue to monitor pain, CMS, dressing, I&O.

## 2019-09-29 NOTE — PROGRESS NOTES
[facility] Orthopedic Brief Operative Note    Pre-operative diagnosis: Left index finger and hand infection.   Post-operative diagnosis: Same   Procedure: Left hand incision and drainage with excisional debridement.   Surgeon: Jaime Jordan MD   Assistant(s): None   Anesthesia: General mask   Estimated blood loss: Less than 10 ml   Total IV fluids: (See anesthesia record)   Blood transfusion: No transfusion was given during surgery   Total urine output: (See anesthesia record)   Drains: Iodoform gauze packed in wound.   Specimens: Cultures obtained.   Implants: None   Findings: Subcutaneous purulence from PIP to past metacarpal phalangeal joint.   Complications: None   Condition: Stable   Weight bearing status: Weight bearing as tolerated   Activity: Activity as tolerated  Patient may move about with assist as indicated or with supervision   Orthotic management: Not applicable   Comments: See dictated operative report for full details

## 2019-09-29 NOTE — PROGRESS NOTES
To OR per wheelchair for I&D of left index finger. Showered and EKG was completed. Pain controlled with one dose of IV Dilaudid at 1124. Good CMS to left index finger. VSS NPO since midnight. IV Zosyn infused this morning and next dose sent with pt. Charis still infusing.

## 2019-09-29 NOTE — PROGRESS NOTES
[facility]    Medicine Progress Note - Hospitalist Service       Date of Admission:  9/27/2019  Assessment & Plan     Andrew Avlarez is a 68 year old male admitted on 9/27/2019. He presents with worsening pain and swelling of left index finger and hand.  Injured with either a bite or cut 5 days ago while apple picking.  Seen 2 days ago and treated for early abscess with a dose of Ancef and has been taking Keflex.  Over the past 2 days with increasing pain and swelling associate with fever and chills at home.  On exam is afebrile with swelling and pain involving the index finger and in a carbuncle over the dorsum of the PIP knuckle left index finger.  There is redness extending up the forearm.  White blood cell count is elevated, he is underlying well-controlled diabetes.  Patient will be admitted inpatient status and treated with IV antibiotics including Zosyn and vancomycin with wound culture and blood cultures pending.  Expect to be in 2 days to develop antibiotic plan.  If worsening, may need orthopedic consult and intervention.    Cellulitis of finger of left hand  Assessment: History of bite or injury 5 days ago with developing redness and pain over the past 2 days. Seen 2 days ago, started on Keflex after a single dose of Ancef. Increasing pain associate with fever and chills at home. Exam with increased redness and pain with developing small boil on the dorsum of the left index finger. Cultures of wound have been done.  Will admit inpatient status for IV Zosyn and vancomycin. 9/28: Patient states his finger has not worsened but not improved significantly. The redness moving up his arm has improved.  Patient has been afebrile and hemodynamically stable.  9/29:  Last night patient finger became more red and swollen. Cellulitis of the forearm has resolved on vancomycin and Zosyn. Patient redness extended to the metacarpal joint. XR was ordered and MRI done this AM. MRI shows  abscess formation. Cultures remain negative. Patient was seen by orthopedics, recommend I&D.  Patient has a history of SHOLA and Diabetes, daily smoker. CXR normal. EGK normal sinus. Patient labs stable. Mild hypernatremia. Glucose monitoring has been stable. Patient has been NPO since midnight. Discussed with Dr Anguiano.      Patient is medically cleared for surgery without further testing.     Plan: Continue IV Zosyn and Vancomycin. Wound culture pending. Continue to monitor closely. Recheck labs tomorrow. Patient will have an I&D today.     Type 2 diabetes mellitus without complication, without long-term current use of insulin (H)  Assessment: Controlled at home taking metformin, blood sugars typically in the 150 range.  9/29: Patient with stable blood sugars.   Plan: Continue metformin. Continue to monitor blood glucose.     SHOLA (obstructive sleep apnea)  Assessment: Uses CPAP for sleep. 9/29: Stable  Plan: Continue home CPAP    Cigarette smoker  Assessment: Daily smoker, about 1/4 pack/day 9/29: Stable  Plan: Declines nicotine replacement    Chronic hepatitis C without hepatic coma (H)  Assessment: No current symptoms, treated in 2017. 9/29: Stable  Plan: Outpatient follow-up     Diet: NPO per Anesthesia Guidelines for Procedure/Surgery Except for: No Exceptions    DVT Prophylaxis: Pneumatic Compression Devices and Ambulate every shift  Winter Catheter: not present  Code Status: Full Code      Disposition Plan   Expected discharge: 1-2 days, recommended to prior living arrangement once antibiotic plan established.  Entered: Kayli Wong CNP 09/29/2019, 12:12 PM       The patient's care was discussed with the Attending Physician, Dr. Anguiano, Bedside Nurse, Care Coordinator/, Patient and Patient's Family.    Kayli Wong CNP  Hospitalist Service  [facility]    ______________________________________________________________________    Interval History   Patient  is feeling ok, his finger is having more pain. Patient up walking. NPO at this time for possible surgery. Patient with no concerns. Will need surgery today. 10 point ROS neg other than the symptoms noted above       Data reviewed today: I reviewed all medications, new labs and imaging results over the last 24 hours.      Physical Exam   Vital Signs: Temp: 97.5  F (36.4  C) Temp src: Oral BP: 121/76 Pulse: 72   Resp: 20 SpO2: 94 % O2 Device: None (Room air)    Weight: 197 lbs 9.6 oz    Constitutional: awake, alert, cooperative, no apparent distress, and appears stated age  Eyes: Lids and lashes normal, pupils equal, round and reactive to light, extra ocular muscles intact, sclera clear, conjunctiva normal  Respiratory: No increased work of breathing, good air exchange, clear to auscultation bilaterally, no crackles or wheezing  Cardiovascular: regular rate and rhythm  GI: normal bowel sounds, soft, non-distended and non-tender  Skin: Left index finger is swollen with a small blister/boil on the dorsum of the left PIP knuckle extending to the metacarpal joint, hand and arm redness nearly resolved.    Musculoskeletal: Swelling and pain involving the index finger with pain with movement of the finger and the hand.  Wrist is unremarkable and elbow shows full motion without pain.  Neurologic: Awake, alert, oriented to name, place and time.  Cranial nerves II-XII are grossly intact.  Motor is 5 out of 5 bilaterally.         Data   Recent Labs   Lab 09/29/19  0630 09/29/19  0616 09/28/19  0529 09/27/19  2316   WBC  --  9.0 11.2* 13.8*   HGB  --  12.3* 13.5 14.3   MCV  --  94 92 92   PLT  --  178 181 203   *  --  145* 142   POTASSIUM 4.2  --  3.6 3.5   CHLORIDE 112*  --  114* 110*   CO2 26  --  23 24   BUN 21  --  19 21   CR 0.94  --  0.83 0.75   ANIONGAP 7  --  8 8   VICKI 8.1*  --  8.2* 8.3*   *  --  157* 132*   ALBUMIN  --   --   --  3.2*   PROTTOTAL  --   --   --  7.4   BILITOTAL  --   --   --  0.3   ALKPHOS   --   --   --  69   ALT  --   --   --  18   AST  --   --   --  11     Recent Results (from the past 24 hour(s))   X-ray lt finger 2-3 view    Narrative    LEFT FINGER TWO OR MORE VIEWS   9/28/2019 7:25 PM     HISTORY: Left finger cellulitis.    COMPARISON: None.      Impression    IMPRESSION: No acute bony abnormality. 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 Soft tissue swelling about the index  finger. No soft tissue gas.       LUCIANO BERG MD   MR Hand Left w/o & w Contrast    Narrative    MR HAND LEFT W/O & W CONTRAST 9/29/2019 9:54 AM    HISTORY:  left finger cellulitis, possible abscess, evaluate for  osteomyelitis    TECHNIQUE: Multiplanar, multisequence MRI of the left hand was  obtained before and after intravenous contrast.    COMPARISON: Radiographs from 9/28/2019    FINDINGS:   There is dorsal soft tissue swelling, most pronounced at the index  finger and to a lesser extent the middle finger. This demonstrates  enhancement on postcontrast sequences compatible with cellulitis.  There is skin blistering dorsal to the second proximal phalanx. There  patchy nonenhancement subjacent to the blister and extending  proximally over the MCP joint; this does not demonstrate simple fluid  signal and may represent a phlegmonous collection or early developing  abscess. There is a 1 cm fluid collection dorsal to the third MCP  joint which may represent a ganglion cyst although cannot exclude a  small abscess.    No abnormal marrow signal to suggest osteomyelitis. Moderate first CMC  joint degenerative changes with cartilage thinning, subchondral cysts,  small osteophytes, and a chronic ossicle. Mild STT joint degenerative  changes. Normal joint alignment is maintained.    No significant joint effusion. No definite tendon tear.      Impression    IMPRESSION:   1.  Dorsal hand cellulitis, most pronounced at the index finger.  2.  Phlegmon versus early developing abscess along the dorsal  index  finger with an overlying blister.   3.  Small fluid collection dorsal to the third MCP joint may represent  a ganglion cyst although cannot exclude a tiny abscess.  4.  No evidence of acute osteomyelitis.  5.  Moderate first CMC and mild STT joint degenerative changes.    ZELALEM LINDSEY MD   XR Chest Port 1 View    Narrative    CHEST ONE VIEW PORTABLE   9/29/2019 12:43 PM     HISTORY:  Preoperative for left hand infection.    COMPARISON: None.      Impression    IMPRESSION: Negative chest. Lungs clear.    LEIGHANN MURILLO MD

## 2019-09-29 NOTE — PROGRESS NOTES
Patient has a history of SHOLA and Diabetes, daily smoker. CXR normal. EGK normal sinus. Patient labs stable. Mild hypernatremia. Glucose monitoring has been stable. Patient has been NPO since midnight. Discussed with Dr Anguiano.     Patient is medically cleared for surgery without further testing.     Kayli Wong, CNP

## 2019-09-29 NOTE — PLAN OF CARE
Vss. Pt received tylenol and oxycodone last evening for L hand/finger pain. Pt has been able to rest/sleep between cares and has declined need for pain medication overnight. CMS to L hand is intact. Pt is up independently and tolerates activity well. Has been NPO since midnight as ordered. MRI of L hand today. Will continue with plan of care.

## 2019-09-29 NOTE — ANESTHESIA CARE TRANSFER NOTE
Patient: Andrew Alvarez    Procedure(s):  Irrigationa and Debridement Left Second Finger Wound    Diagnosis: Open wound of left forearm, initial encounter [S51.880X]  Diagnosis Additional Information: No value filed.    Anesthesia Type:   General, LMA     Note:  Airway :Nasal Cannula  Patient transferred to:PACU  Handoff Report: Identifed the Patient, Identified the Reponsible Provider, Reviewed the pertinent medical history, Discussed the surgical course, Reviewed Intra-OP anesthesia mangement and issues during anesthesia, Set expectations for post-procedure period and Allowed opportunity for questions and acknowledgement of understanding      Vitals: (Last set prior to Anesthesia Care Transfer)    CRNA VITALS  9/29/2019 1349 - 9/29/2019 1426      9/29/2019             Pulse:  84    SpO2:  99 %                Electronically Signed By: MARCO A Duncan CRNA  September 29, 2019  2:26 PM

## 2019-09-29 NOTE — PLAN OF CARE
10mg Oxycodone after I&D has helped to control pain in left hand. Dressing CDI and good CMS in left hand. Ambulated in fall after eating all of clear liquid supper with toast and again now. No nausea. Slight temp of 99.2. Family in room. Sitting in chair visiting. Will continue to monitor pain, dressing, CMS, vitals.

## 2019-09-30 LAB
ANION GAP SERPL CALCULATED.3IONS-SCNC: 7 MMOL/L (ref 3–14)
BACTERIA SPEC CULT: NO GROWTH
BUN SERPL-MCNC: 15 MG/DL (ref 7–30)
CALCIUM SERPL-MCNC: 8 MG/DL (ref 8.5–10.1)
CHLORIDE SERPL-SCNC: 109 MMOL/L (ref 94–109)
CO2 SERPL-SCNC: 26 MMOL/L (ref 20–32)
CREAT SERPL-MCNC: 0.97 MG/DL (ref 0.66–1.25)
ERYTHROCYTE [DISTWIDTH] IN BLOOD BY AUTOMATED COUNT: 13.7 % (ref 10–15)
GFR SERPL CREATININE-BSD FRML MDRD: 79 ML/MIN/{1.73_M2}
GLUCOSE BLDC GLUCOMTR-MCNC: 116 MG/DL (ref 70–99)
GLUCOSE BLDC GLUCOMTR-MCNC: 119 MG/DL (ref 70–99)
GLUCOSE BLDC GLUCOMTR-MCNC: 130 MG/DL (ref 70–99)
GLUCOSE SERPL-MCNC: 158 MG/DL (ref 70–99)
HCT VFR BLD AUTO: 38.5 % (ref 40–53)
HGB BLD-MCNC: 12.6 G/DL (ref 13.3–17.7)
Lab: NORMAL
MCH RBC QN AUTO: 30.7 PG (ref 26.5–33)
MCHC RBC AUTO-ENTMCNC: 32.7 G/DL (ref 31.5–36.5)
MCV RBC AUTO: 94 FL (ref 78–100)
PLATELET # BLD AUTO: 200 10E9/L (ref 150–450)
POTASSIUM SERPL-SCNC: 3.9 MMOL/L (ref 3.4–5.3)
RBC # BLD AUTO: 4.11 10E12/L (ref 4.4–5.9)
SODIUM SERPL-SCNC: 142 MMOL/L (ref 133–144)
SPECIMEN SOURCE: NORMAL
VANCOMYCIN SERPL-MCNC: 15.2 MG/L
WBC # BLD AUTO: 8.2 10E9/L (ref 4–11)

## 2019-09-30 PROCEDURE — 85027 COMPLETE CBC AUTOMATED: CPT | Performed by: ORTHOPAEDIC SURGERY

## 2019-09-30 PROCEDURE — 80048 BASIC METABOLIC PNL TOTAL CA: CPT | Performed by: ORTHOPAEDIC SURGERY

## 2019-09-30 PROCEDURE — 99207 ZZC APP CREDIT; MD BILLING SHARED VISIT: CPT | Performed by: NURSE PRACTITIONER

## 2019-09-30 PROCEDURE — 25800030 ZZH RX IP 258 OP 636: Performed by: ORTHOPAEDIC SURGERY

## 2019-09-30 PROCEDURE — 25000128 H RX IP 250 OP 636: Performed by: ORTHOPAEDIC SURGERY

## 2019-09-30 PROCEDURE — 12000000 ZZH R&B MED SURG/OB

## 2019-09-30 PROCEDURE — 36415 COLL VENOUS BLD VENIPUNCTURE: CPT | Performed by: FAMILY MEDICINE

## 2019-09-30 PROCEDURE — 36415 COLL VENOUS BLD VENIPUNCTURE: CPT | Performed by: ORTHOPAEDIC SURGERY

## 2019-09-30 PROCEDURE — 00000146 ZZHCL STATISTIC GLUCOSE BY METER IP

## 2019-09-30 PROCEDURE — 80202 ASSAY OF VANCOMYCIN: CPT | Performed by: FAMILY MEDICINE

## 2019-09-30 PROCEDURE — 99233 SBSQ HOSP IP/OBS HIGH 50: CPT | Performed by: HOSPITALIST

## 2019-09-30 PROCEDURE — 99207 ZZC CDG-CHARGE REQUIRED MANUAL ENTRY: CPT | Mod: 59 | Performed by: HOSPITALIST

## 2019-09-30 PROCEDURE — 25000132 ZZH RX MED GY IP 250 OP 250 PS 637: Performed by: ORTHOPAEDIC SURGERY

## 2019-09-30 RX ORDER — AMOXICILLIN 250 MG
1 CAPSULE ORAL 2 TIMES DAILY PRN
Status: DISCONTINUED | OUTPATIENT
Start: 2019-09-30 | End: 2019-10-01 | Stop reason: HOSPADM

## 2019-09-30 RX ORDER — OXYCODONE HYDROCHLORIDE 5 MG/1
5-10 TABLET ORAL EVERY 4 HOURS PRN
Qty: 40 TABLET | Refills: 0 | Status: SHIPPED | OUTPATIENT
Start: 2019-09-30 | End: 2019-10-08

## 2019-09-30 RX ADMIN — IBUPROFEN 600 MG: 600 TABLET ORAL at 23:38

## 2019-09-30 RX ADMIN — CETIRIZINE HYDROCHLORIDE 5 MG: 1 SOLUTION ORAL at 08:02

## 2019-09-30 RX ADMIN — OXYCODONE HYDROCHLORIDE 10 MG: 5 TABLET ORAL at 02:11

## 2019-09-30 RX ADMIN — TAZOBACTAM SODIUM AND PIPERACILLIN SODIUM 3.38 G: 375; 3 INJECTION, SOLUTION INTRAVENOUS at 01:47

## 2019-09-30 RX ADMIN — TAZOBACTAM SODIUM AND PIPERACILLIN SODIUM 3.38 G: 375; 3 INJECTION, SOLUTION INTRAVENOUS at 08:03

## 2019-09-30 RX ADMIN — TAZOBACTAM SODIUM AND PIPERACILLIN SODIUM 3.38 G: 375; 3 INJECTION, SOLUTION INTRAVENOUS at 14:21

## 2019-09-30 RX ADMIN — OXYCODONE HYDROCHLORIDE 10 MG: 5 TABLET ORAL at 12:25

## 2019-09-30 RX ADMIN — ASPIRIN 81 MG: 81 TABLET ORAL at 08:01

## 2019-09-30 RX ADMIN — OXYCODONE HYDROCHLORIDE 10 MG: 5 TABLET ORAL at 19:46

## 2019-09-30 RX ADMIN — TAZOBACTAM SODIUM AND PIPERACILLIN SODIUM 3.38 G: 375; 3 INJECTION, SOLUTION INTRAVENOUS at 19:47

## 2019-09-30 RX ADMIN — IBUPROFEN 600 MG: 600 TABLET ORAL at 16:51

## 2019-09-30 RX ADMIN — VANCOMYCIN HYDROCHLORIDE 1500 MG: 1 INJECTION, POWDER, LYOPHILIZED, FOR SOLUTION INTRAVENOUS at 12:18

## 2019-09-30 RX ADMIN — METFORMIN HYDROCHLORIDE 1000 MG: 500 TABLET, FILM COATED ORAL at 16:51

## 2019-09-30 RX ADMIN — OXYCODONE HYDROCHLORIDE 10 MG: 5 TABLET ORAL at 23:37

## 2019-09-30 RX ADMIN — VANCOMYCIN HYDROCHLORIDE 1500 MG: 1 INJECTION, POWDER, LYOPHILIZED, FOR SOLUTION INTRAVENOUS at 23:41

## 2019-09-30 RX ADMIN — METFORMIN HYDROCHLORIDE 500 MG: 500 TABLET, FILM COATED ORAL at 08:01

## 2019-09-30 RX ADMIN — OXYCODONE HYDROCHLORIDE 10 MG: 5 TABLET ORAL at 07:11

## 2019-09-30 ASSESSMENT — ACTIVITIES OF DAILY LIVING (ADL)
ADLS_ACUITY_SCORE: 10

## 2019-09-30 NOTE — OP NOTE
Procedure Date: 09/29/2019      PREOPERATIVE DIAGNOSIS:  Left index finger and hand infection.      POSTOPERATIVE DIAGNOSIS:  Left index finger and hand infection.      PROCEDURE:  Left index finger and hand incision and drainage with excisional debridement.      SURGEON:  Jaime Jordan MD.        HISTORY:  This is a 68-year-old male who probably got stung by a bee 5 days ago while picking up rotten apples.  He developed an infection in the hand which has gotten worse over several days.  He started on oral antibiotics and then IV antibiotics.  Cellulitis of the arm has improved, but the finger has worsened with tense swelling and discoloration of the index finger from the PIP to just past the MCP joint.  MRI scan shows some fluid or start of an abscess in the subcutaneous tissue.  No tendon involvement.  He presents for incision and drainage with debridement.      DESCRIPTION OF PROCEDURE:  After smooth general anesthetic, the patient's left arm was prepped and draped in sterile fashion.  Pause was performed for patient verification.  The arm was exsanguinated, tourniquet inflated to 250 mmHg.  There was a pustule blister over the PIP joint.  This was incised and skin of the blistering debrided.  This did not appear to go deep.  Cultures were obtained of this, but it was mostly clear fluid.  I then made a second incision more where the original sting was, just proximal to the PIP joint, and here we exposed purulence.  Cultures were again obtained.  This could enter a pocket extending proximally past the MCP joint to adequately debride this.  I made a third incision more proximally just proximal to the MCP joint and just ulnarward.  This again went into the subcutaneous tissue and through these latter 2 incisions I used curets and a rongeur to debride infectious tissue and the purulent tissue.  Once this was scraped and debrided, I irrigated into one incision and out the other with antibiotic solution.  I again  scraped it and irrigated once again.  We used a liter of antibiotic irrigation.  At this point, the tourniquet was deflated.  Bleeding was controlled with pressure.  I then packed iodoform gauze into both wounds independently and applied sterile dressings.  He will start dressing changes tomorrow with removal of the packing and Betadine soaks.  Once we are satisfied he is making clinical improvement and have stopped his antibiotic, he may be discharged.  I will plan to see him back later this week to assess his progress, or if he wishes to be seen in [place] he will see one of the orthopedic doctors or PAs here.         ALLIE MACDONALD MD             D: 2019   T: 2019   MT: KAYODE      Name:     KEVAN PEREZ   MRN:      [id number]        Account:        [id number]   :      1950           Procedure Date: 2019      Document: [id number]

## 2019-09-30 NOTE — PROGRESS NOTES
[facility]    Progress note    68 year old male POD#1 s/p left index finger irrigation and debridement with excision debridement by Dr. Jordan  S: Patient seen at bedside in no apparent distress, alert and pleasant.  I discussed surgery and rehabilitation with the patient.  He denies numbness, tingling or major pain issues.  Patient admits that his fevers and chills have gotten much better and less intense since he has been in the hospital and has had surgery.  He also feels that his pain has gotten better since being in the hospital and having surgery.  He does get throbbing still at times.  We did discuss about waiting for cultures to come back and figuring out antibiotic plan.  He may be staying until the cultures come back.  He understands.    Pain Assessment:  Current Pain Score 9/30/2019   Patient currently in pain? yes   Pain score (0-10) -   Pain descriptors -   - Andrew is experiencing pain due to surgery and infection. Pain management was discussed and the plan was created in a collaborative fashion.  Andrew's response to the current recommendations: engaged  compliant  - Opioid regimen: Oxycodone  - Response to opioid medications: Reduction of symptoms  - Bowel regimen: senna,fluids, activity  - Pharmacologic adjuvants: Acetaminophen  - Non-pharmacologic adjuvants: Ice and elevation  - Previous pain medications/therapies tried: IV dilaudid after surgery    O: CMS intact LUE, able to flex and extend left index finger.       5 out of 5 extension and flexion of all digits, 5 out of 5 thumb strength  No tenderness to palpation of upper forearm or elbow or finger.         No increased warmth noted  Capillary refill in left index finger less than 2 seconds.            Dressing on, clean and dry and intact       Left tip of index finger with minimal swelling and no erythema or ecchymosis.  The rest of the hand and wrist is covered by dressing       Nursing team will do dressing  "today.       Bilateral calves soft and non tender    Vital signs:  Temp: 96.5  F (35.8  C) Temp src: Oral BP: 108/60 Pulse: 70 Heart Rate: 65 Resp: 20 SpO2: 93 % O2 Device: None (Room air) Oxygen Delivery: 1 LPM Height: 170.2 cm (5' 7\") Weight: 89.6 kg (197 lb 9.6 oz)  Estimated body mass index is 30.95 kg/m  as calculated from the following:    Height as of this encounter: 1.702 m (5' 7\").    Weight as of this encounter: 89.6 kg (197 lb 9.6 oz).      Hemoglobin   Date Value Ref Range Status   09/30/2019 12.6 (L) 13.3 - 17.7 g/dL Final     No results found for: INR      A/P:  1. Pain-controlled   2. DVT Prophylaxis-leg pumps.  Patient up and ambulating.  3. Weight Bearing Status-WBAT left UE  4.  Cultures-still pending  5.  Antibiotics-IV vancomycin.  Plan on switching to oral when cultures come back.  6.  Infectious disease-consult if needed.  Discussed with hospitalist this morning and the thought is to hold off on this unless needed.  7. Hospitalist-talk to Dr. Escobar this morning.  We will await cultures results and decide on oral antibiotic at that time and discharge at that time to home.  8. Incision-no signs or symptoms of increasing infection.  Afebrile.  I did get clarification on dressing change instructions.  I will put in an order.  Dr. Jordan wants dressing change today by RN and iodoform/packing removal and Betadine/saline soak, then going forward dressing changing daily with Betadine/saline soak until follow-up.  9. Plan-will await cultures results.  Plan on oral antibiotic after that and discharge to home.  Follow-up with Dr. Jordan in [place] in 1 week.  Will do discharge orders after cultures come back.    [place] PA-C  9/30/2019        "

## 2019-09-30 NOTE — PROGRESS NOTES
SPIRITUAL HEALTH SERVICES  SPIRITUAL ASSESSMENT Progress Note  [facility]      During Rounding,  introduced himself to Andrew Alvarez and informed him of his availability.    Juan Osorio M.Div., [facility]  Staff   Office tel: [phone]

## 2019-09-30 NOTE — PROGRESS NOTES
[facility]    Medicine Progress Note - Hospitalist Service       Date of Admission:  9/27/2019  Assessment & Plan        Andrew Alvarez is a 68 year old male with a history of type II DM admitted on 9/27/2019. He presented with worsening pain and swelling of left index finger and hand.  Injured with either a bite or cut 5 days prior to admission while apple picking. Patient was seen two days prior to admission in  ED and treated for early abscess with a dose of Ancef and has been taking Keflex. Patient returned home but noted worsening pain and swelling associated with fever and chills at home.  On exam, patient was afebrile with swelling and pain involving the index finger and in a carbuncle over the dorsum of the PIP knuckle left index finger.  There was redness extending up the forearm.  White blood cell count was elevated. Patient was admitted inpatient status and treated with IV antibiotics including Zosyn and vancomycin with wound culture and blood cultures pending. Patient with minimal improvement on IV antibiotics so orthopedics was consulted. Patient underwent I & D of left finger on 9/29/19.      Cellulitis of finger of left hand  Assessment: History of bite or injury 5 days ago with developing redness and pain over the past 2 days. Seen 2 days ago, started on Keflex after a single dose of Ancef. Increasing pain associate with fever and chills at home. Exam with increased redness and pain with developing small boil on the dorsum of the left index finger. Cultures of wound have been done.  Will admit inpatient status for IV Zosyn and vancomycin. 9/28: Patient states his finger has not worsened but not improved significantly. The redness moving up his arm has improved.  Patient has been afebrile and hemodynamically stable.  9/29:  Last night patient finger became more red and swollen. Cellulitis of the forearm has resolved on vancomycin and Zosyn. Patient redness  "extended to the metacarpal joint. XR was ordered and MRI done this AM. MRI shows abscess formation. Cultures remain negative. Patient was seen by orthopedics, recommend I&D.  9/30- Patient is POD #1 left index finger irrigation and debridement with excision debridement. Cultures obtained during procedure and are pending. Patient notes ongoing pain in left index finger which he describes as a \"throbbing\" pain. Notes pain is well managed with as needed oxycodone. Denies shortness of breath and patient is maintaining oxygen saturations above 90% on room air.  Denies nausea and is tolerating oral intake. Patient is afebrile and hemodynamically stable.    Plan: Will continue IV Zosyn and Vancomycin while awaiting wound culture results.  Continue to monitor closely. Recheck labs tomorrow.       Type 2 diabetes mellitus without complication, without long-term current use of insulin (H)  Assessment: Controlled at home taking metformin, blood sugars typically in the 150 range.  9/30: Patient with stable blood sugars.   Plan: Continue metformin. Continue to monitor blood glucose.      SHOLA (obstructive sleep apnea)  Assessment: Uses CPAP for sleep. 9/30: Stable  Plan: Continue home CPAP     Cigarette smoker  Assessment: Daily smoker, about 1/4 pack/day 9/30: Stable  Plan: Declines nicotine replacement     Chronic hepatitis C without hepatic coma (H)  Assessment: No current symptoms, treated in 2017. 9/30: Stable  Plan: Outpatient follow-up       Diet: Advance Diet as Tolerated: Regular Diet Adult    DVT Prophylaxis: Pneumatic Compression Devices  Winter Catheter: not present  Code Status: Full Code      Disposition Plan   Expected discharge: 1-2 days, recommended to prior living arrangement once adequate pain management/ tolerating PO medications, antibiotic plan established and cleared by orthopedics.  Entered: Shiraz Jiménez NP 09/30/2019, 1:21 PM       The patient's care was discussed with the Attending Physician, Dr. Anguiano " "and Patient.    Shiraz Jiménez NP  Hospitalist Service  [facility]    ______________________________________________________________________    Interval History   Patient seen sitting up in bed with no new complaints. Patient notes ongoing pain in left index finger which he describes as a \"throbbing\" pain. Notes pain is well managed with as needed oxycodone. Denies shortness of breath and patient is maintaining oxygen saturations above 90% on room air.  Denies nausea and is tolerating oral intake. Patient is afebrile and hemodynamically stable.     Data reviewed today: I reviewed all medications, new labs and imaging results over the last 24 hours.    Physical Exam   Vital Signs: Temp: 97.6  F (36.4  C) Temp src: Oral BP: 130/71 Pulse: 68 Heart Rate: 68 Resp: 18 SpO2: 91 % O2 Device: None (Room air) Oxygen Delivery: 1 LPM  Weight: 197 lbs 9.6 oz  Constitutional: awake, alert, cooperative, no apparent distress, and appears stated age  Respiratory: No increased work of breathing, good air exchange, clear to auscultation bilaterally, no crackles or wheezing  Cardiovascular: regular rate and rhythm, normal S1 and S2 and no murmur noted  GI: normal bowel sounds, non-distended and non-tender  Skin: left hand wrapped; redness to hand and arm nearly resolved  Musculoskeletal: no lower extremity pitting edema present  full range of motion noted  Neurologic: Awake, alert, oriented to name, place and time.    Data   Recent Labs   Lab 09/30/19  0531 09/29/19  0630 09/29/19  0616 09/28/19  0529 09/27/19  2316   WBC 8.2  --  9.0 11.2* 13.8*   HGB 12.6*  --  12.3* 13.5 14.3   MCV 94  --  94 92 92     --  178 181 203    145*  --  145* 142   POTASSIUM 3.9 4.2  --  3.6 3.5   CHLORIDE 109 112*  --  114* 110*   CO2 26 26  --  23 24   BUN 15 21  --  19 21   CR 0.97 0.94  --  0.83 0.75   ANIONGAP 7 7  --  8 8   VICKI 8.0* 8.1*  --  8.2* 8.3*   * 129*  --  157* 132*   ALBUMIN  --   --   --  "  --  3.2*   PROTTOTAL  --   --   --   --  7.4   BILITOTAL  --   --   --   --  0.3   ALKPHOS  --   --   --   --  69   ALT  --   --   --   --  18   AST  --   --   --   --  11     No results found for this or any previous visit (from the past 24 hour(s)).  Medications       aspirin  81 mg Oral Daily     cetirizine  5 mg Oral Daily     metFORMIN  1,000 mg Oral Daily with supper     metFORMIN  500 mg Oral Daily with breakfast     piperacillin-tazobactam  3.375 g Intravenous Q6H     sodium chloride (PF)  3 mL Intracatheter Q8H     vancomycin (VANCOCIN) IV  1,500 mg Intravenous Q12H

## 2019-09-30 NOTE — PLAN OF CARE
VSS, pain to left hand/fingers tolerable with prn oxycodone, tylenol and ibuprofen. Alert and Orientated. Lungs are diminished with expiratory wheezing in lower lobes. Tolerating regular diet. Dressing changed to left hand, removed packing, moderate pus, serosanguinous drainage. Soaked in betadyne/saline mixture, redressed (no packing). CMS intact to left hand, +1 edema present. Up independently. Walking halls multiple times a day. Continue to monitor patient and plan of care.

## 2019-09-30 NOTE — PROGRESS NOTES
.Notified MD at 1 PM regarding order clarification.      Spoke with: [facility]    Orders were not obtained.    Comments: Twice a day soak finger in betadine or Povidone Iodine with saline with approximately 1 to 10 concentration.  With first dressing change remove packing and do not replace packing.  It is okay to substitute Betadine with hydrogen peroxide and dilute approximately 1 to 3-10 with saline

## 2019-09-30 NOTE — PLAN OF CARE
Vss. Has been on RA though O2 sats were 87-88% on RA middle of night and 1L nc applied for O2 sats 91% for a few hours, then trialed on RA again and O2 sats 92-93%, so on RA at this time again. Pt has known sleep apnea and normally wears CPAP at home, though pt does not have own CPAP machine here from home. Lungs have pleural rub to RLL, clear after coughing. Pain to L hand is controlled with Oxycodone 10mg last evening and once overnight. Yesterday evening pt also requested one dose of Dilaudid and was given. Surgical dressing is clean dry and intact to L hand. CMS intact, though unable to assess radial pulse due to surgical dressing in place. Pt has kept LUE elevated on pillows. Pt is up ambulating halls several times. Will continue with plan of care, monitor pain, labs, CMS, await cultures form L hand I and D.

## 2019-09-30 NOTE — PROGRESS NOTES
S: Patient has indicated intent to leave the unit to smoke.    B: Here with cellulitis to left hand/fingers from insect bite. Alert and Orientated. Up independently. Not receiving any IV narcotics.    A: Patient has indicated intent to leave the unit to smoke.    Has been notified that smoking is not allowed on the hospital premises.    Has been offered nicotine replacement therapy and smoking cessation resources, but has declined.    Has been notified that, according to policy, staff will not accompany patient off the unit for the purpose of smoking.    Has been advised of the potential safety concerns related to leaving the unit unsupervised by staff    R: Patient verbalizes understanding of education and chooses to leave the unit to smoke in spite of being advised against it, and has been requested to return within 30 minutes.

## 2019-09-30 NOTE — PHARMACY-VANCOMYCIN DOSING SERVICE
Pharmacy Vancomycin Note  Date of Service 2019  Patient's  1950   68 year old, male    Indication: Skin and Soft Tissue Infection  Goal Trough Level: 10-15 mg/L  Day of Therapy: 4  Current Vancomycin regimen:  1500 mg IV q12h    Current estimated CrCl = Estimated Creatinine Clearance: 77.8 mL/min (based on SCr of 0.97 mg/dL).    Creatinine for last 3 days  2019: 11:16 PM Creatinine 0.75 mg/dL  2019:  5:29 AM Creatinine 0.83 mg/dL  2019:  6:30 AM Creatinine 0.94 mg/dL  2019:  5:31 AM Creatinine 0.97 mg/dL    Recent Vancomycin Levels (past 3 days)  2019: 11:03 AM Vancomycin Level 15.2 mg/L    Vancomycin IV Administrations (past 72 hours)                   vancomycin (VANCOCIN) 1,500 mg in sodium chloride 0.9 % 250 mL intermittent infusion (mg) 1,500 mg New Bag 19 2250     1,500 mg New Bag  1136     1,500 mg New Bag 19 2301     1,500 mg New Bag  1036     1,500 mg New Bag 19 2353                Nephrotoxins and other renal medications (From now, onward)    Start     Dose/Rate Route Frequency Ordered Stop    19 0600  piperacillin-tazobactam (ZOSYN) infusion 3.375 g      3.375 g  100 mL/hr over 30 Minutes Intravenous EVERY 6 HOURS 19 0053      19 0053  ibuprofen (ADVIL/MOTRIN) tablet 600 mg      600 mg Oral EVERY 6 HOURS PRN 19 0053      19 2303  vancomycin (VANCOCIN) 1,500 mg in sodium chloride 0.9 % 250 mL intermittent infusion      1,500 mg  over 90 Minutes Intravenous EVERY 12 HOURS 19 2302               Contrast Orders - past 72 hours (72h ago, onward)    Start     Dose/Rate Route Frequency Ordered Stop    19 1000  gadobutrol (GADAVIST) injection 8.96 mL      0.1 mL/kg × 89.6 kg Intravenous ONCE 19 0952 19 0953          Interpretation of levels and current regimen:  Trough level is  Therapeutic    Has serum creatinine changed > 50% in last 72 hours: No    Urine output:  unable to determine    Renal  Function: Stable    Plan:  1.  Continue Current Dose  2.  Pharmacy will check trough levels as appropriate in 1-3 Days.    3. Serum creatinine levels will be ordered a minimum of twice weekly.      Perico Sheets RPH        .

## 2019-10-01 VITALS
RESPIRATION RATE: 18 BRPM | OXYGEN SATURATION: 94 % | HEART RATE: 59 BPM | WEIGHT: 197.6 LBS | BODY MASS INDEX: 31.01 KG/M2 | SYSTOLIC BLOOD PRESSURE: 139 MMHG | TEMPERATURE: 97.6 F | HEIGHT: 67 IN | DIASTOLIC BLOOD PRESSURE: 69 MMHG

## 2019-10-01 LAB
BACTERIA SPEC CULT: NO GROWTH
GLUCOSE BLDC GLUCOMTR-MCNC: 128 MG/DL (ref 70–99)
Lab: NORMAL
SPECIMEN SOURCE: NORMAL

## 2019-10-01 PROCEDURE — 99239 HOSP IP/OBS DSCHRG MGMT >30: CPT | Performed by: HOSPITALIST

## 2019-10-01 PROCEDURE — 99207 ZZC APP CREDIT; MD BILLING SHARED VISIT: CPT | Performed by: NURSE PRACTITIONER

## 2019-10-01 PROCEDURE — 25000132 ZZH RX MED GY IP 250 OP 250 PS 637: Performed by: ORTHOPAEDIC SURGERY

## 2019-10-01 PROCEDURE — 25000128 H RX IP 250 OP 636: Performed by: ORTHOPAEDIC SURGERY

## 2019-10-01 PROCEDURE — 25800030 ZZH RX IP 258 OP 636: Performed by: ORTHOPAEDIC SURGERY

## 2019-10-01 PROCEDURE — 00000146 ZZHCL STATISTIC GLUCOSE BY METER IP

## 2019-10-01 PROCEDURE — 99207 ZZC CDG-CHARGE REQUIRED MANUAL ENTRY: CPT | Mod: 59 | Performed by: HOSPITALIST

## 2019-10-01 RX ADMIN — TAZOBACTAM SODIUM AND PIPERACILLIN SODIUM 3.38 G: 375; 3 INJECTION, SOLUTION INTRAVENOUS at 01:59

## 2019-10-01 RX ADMIN — METFORMIN HYDROCHLORIDE 500 MG: 500 TABLET, FILM COATED ORAL at 08:00

## 2019-10-01 RX ADMIN — TAZOBACTAM SODIUM AND PIPERACILLIN SODIUM 3.38 G: 375; 3 INJECTION, SOLUTION INTRAVENOUS at 08:02

## 2019-10-01 RX ADMIN — IBUPROFEN 600 MG: 600 TABLET ORAL at 06:50

## 2019-10-01 RX ADMIN — ASPIRIN 81 MG: 81 TABLET ORAL at 08:01

## 2019-10-01 RX ADMIN — OXYCODONE HYDROCHLORIDE 10 MG: 5 TABLET ORAL at 06:50

## 2019-10-01 RX ADMIN — OXYCODONE HYDROCHLORIDE 10 MG: 5 TABLET ORAL at 16:25

## 2019-10-01 RX ADMIN — VANCOMYCIN HYDROCHLORIDE 1500 MG: 1 INJECTION, POWDER, LYOPHILIZED, FOR SOLUTION INTRAVENOUS at 12:41

## 2019-10-01 RX ADMIN — TAZOBACTAM SODIUM AND PIPERACILLIN SODIUM 3.38 G: 375; 3 INJECTION, SOLUTION INTRAVENOUS at 14:45

## 2019-10-01 RX ADMIN — CETIRIZINE HYDROCHLORIDE 5 MG: 1 SOLUTION ORAL at 08:01

## 2019-10-01 ASSESSMENT — ACTIVITIES OF DAILY LIVING (ADL)
ADLS_ACUITY_SCORE: 10

## 2019-10-01 NOTE — PROGRESS NOTES
S: Patient discharged to home via w/c with wife    B: Cellulitis of finger of left hand     A: VSS, afebrile. Oxycodone given for left hand pain. Betadine/saline soak done and instructed patient and patient's wife on soak and dressing change. New dressing applied.      R: Discharge instructions reviewed with patient and patient's wife. Listed belongings gathered and returned to patient.      Discharge Nursing Criteria:     Care Plan and Patient education resolved: Yes    New Medications- pt has been educated about purpose and side effects: Yes    Vaccines  Influenza status verified at discharge:  Yes      MISC  Prescriptions if needed, hard copies sent with patient  Yes  Home and hospital aquired medications returned to patient: NA  Medication Bin checked and emptied on discharge Yes  Patient reports post-discharge pain management plan is effective: Yes

## 2019-10-01 NOTE — DISCHARGE SUMMARY
[facility]  Hospitalist Discharge Summary       Date of Admission:  9/27/2019  Date of Discharge:  10/1/2019  Discharging Provider: Shiraz Jiménez NP      Discharge Diagnoses   Active Problems:    Cellulitis of finger of left hand    SHOLA (obstructive sleep apnea)    Cigarette smoker    Type 2 diabetes mellitus without complication, without long-term current use of insulin (H)    Chronic hepatitis C without hepatic coma (H)    Insect bite of hand with infection      Follow-ups Needed After Discharge   Follow-up Appointments     Follow-up and recommended labs and tests       Follow up with primary care provider, MOIZ TAPIA, within 7 days for   hospital follow- up.  No follow up labs or test are needed.    Follow up with Dr. Jordan, at [facility] [phone], within one week for hospital follow- up. No   follow up labs or test are needed.             Unresulted Labs Ordered in the Past 30 Days of this Admission     Date and Time Order Name Status Description    9/29/2019 1351 Anaerobic bacterial culture Preliminary     9/29/2019 1346 Fluid Culture Aerobic Bacterial Preliminary     9/27/2019 2257 Blood culture Preliminary       These results will be followed up by Dr. Jordan/PCP    Discharge Disposition   Discharged to home  Condition at discharge: Stable    Hospital Course       Andrew Alvarez is a 68 year old male with a history of type II DM admitted on 9/27/2019. He presented with worsening pain and swelling of left index finger and hand.  Injured with either a bite or cut 5 days prior to admission while apple picking. Patient was seen two days prior to admission in th ED and treated for early abscess with a dose of Ancef and has been taking Keflex. Patient returned home but noted worsening pain and swelling associated with fever and chills at home.  On exam, patient was afebrile with swelling and pain involving the index finger and  in a carbuncle over the dorsum of the PIP knuckle left index finger.  There was redness extending up the forearm. White blood cell count was elevated. Patient was admitted inpatient status and treated with IV antibiotics including Zosyn and vancomycin with wound culture and blood cultures pending. Patient with minimal improvement on IV antibiotics so orthopedics was consulted. Patient underwent I & D of left finger on 9/29/19. Patient with ongoing improvement in pain, swelling, and redness. On the day of discharge, patient notes mild to moderate pain in left finger which he notes is well managed with as needed oxycodone. Denies shortness of breath and patient is maintaining oxygen saturations above 90% on room air. Denies nausea and is tolerating oral intake to maintain nutrition and hydration status. Patient is afebrile and hemodynamically stable.      Cellulitis of finger of left hand  Assessment: History of bite or injury 5 days ago with developing redness and pain over the past 2 days. Seen 2 days ago, started on Keflex after a single dose of Ancef. Increasing pain associate with fever and chills at home. Exam with increased redness and pain with developing small boil on the dorsum of the left index finger. Cultures of wound have been done.  Will admit inpatient status for IV Zosyn and vancomycin. 10/1- Patient is POD #2 left index finger irrigation and debridement with excision debridement. Cultures obtained during procedure and are showing no growth to date. Patient with ongoing improvement in pain, swelling, and redness. On the day of discharge, patient notes mild to moderate pain in left finger which he notes is well managed with as needed oxycodone. Denies shortness of breath and patient is maintaining oxygen saturations above 90% on room air. Denies nausea and is tolerating oral intake to maintain nutrition and hydration status. Patient is afebrile and hemodynamically stable.   Plan: Patient medically  stable for hospital discharge today. Will stop IV antibiotics and send prescription for Augmentin 875/125 mg twice daily for 10 days to complete course of treatment for cellulitis. Recommended follow up with PCP within one week for hospital follow up. Patient to follow up with Dr. Jordan in one week. Discussed with recommendations for daily 15 minute soak in 1:1 mixture of betadine and saline followed by dressing change.     Sepsis   Assessment: Present on admission and related to underlying cellulitis of finger of left hand following bee sting sustained five days prior to admission while picking apples and characterized by elevated WBC of 13.8 and ANC of 10.4 upon admission with tachypnea. Patient also was started on antibiotic treatment as an outpatient which failed as patient's symptoms worsened. Specific organism unknown and cultures all showed no growth.   Plan: Continue Augmentin as above and follow up with PCP and Dr. Jordan as above.      Type 2 diabetes mellitus without complication, without long-term current use of insulin (H)  Assessment: Controlled at home taking metformin, blood sugars typically in the 150 range.  10/1- Patient with stable blood sugars.   Plan: Home dose of metformin ordered while patient in hospital and continued after discharge     SHOLA (obstructive sleep apnea)  Assessment: Uses CPAP for sleep. 10/1: Stable  Plan: Continue home CPAP after discharge     Cigarette smoker  Assessment: Daily smoker, about 1/4 pack/day 10/1-Stable  Plan: Encouraged smoking cessation.     Chronic hepatitis C without hepatic coma (H)  Assessment: No current symptoms, treated in 2017. 10/1- Stable  Plan: Outpatient follow-up       Consultations This Hospital Stay   PHARMACY TO [facility]  ORTHOPEDIC SURGERY IP CONSULT    Code Status   Full Code    Time Spent on this Encounter   Shiraz WIN NP, personally saw the patient today and spent greater than 30 minutes discharging this patient.       Shiraz  JOSY Jiménez  [facility]  ______________________________________________________________________    Physical Exam   Vital Signs: Temp: 97.6  F (36.4  C) Temp src: Oral BP: 139/69 Pulse: 59 Heart Rate: 60 Resp: 18 SpO2: 94 % O2 Device: None (Room air)    Weight: 197 lbs 9.6 oz  Constitutional: awake, alert, cooperative, no apparent distress, and appears stated age  Respiratory: No increased work of breathing, good air exchange, clear to auscultation bilaterally, no crackles or wheezing  Cardiovascular: regular rate and rhythm, normal S1 and S2 and no murmur noted  GI: normal bowel sounds, non-distended and non-tender  Skin: no bruising or bleeding and normal skin color, texture, turgor  Musculoskeletal: no lower extremity pitting edema present; there is no redness, warmth, or swelling of the joints  Neurologic: Awake, alert, oriented to name, place and time.  Cranial nerves II-XII are grossly intact.        Primary Care Physician   MOIZ TAPIA    Discharge Orders      ORTHOPEDICS ADULT REFERRAL      Reason for your hospital stay    You were in the hospital for an infection to your left index finger and improved     Follow-up and recommended labs and tests     Follow up with primary care provider, MOIZ TAPIA, within 7 days for hospital follow- up.  No follow up labs or test are needed.    Follow up with Dr. Jordan, at [facility] [phone], within one week for hospital follow- up. No follow up labs or test are needed.     Activity    Your activity upon discharge: activity as tolerated     Wound care and dressings    Instructions to care for your wound at home: daily dressing change following 15 min soak of betadine/saline. Continue daily soak and dressing change until follow up with Dr. Jordan. OK to shower if dressing stays dry.     Diet    Follow this diet upon discharge: Orders Placed This Encounter      Advance Diet as Tolerated:  Regular Diet Adult       Significant Results and Procedures   Most Recent 3 CBC's:  Recent Labs   Lab Test 09/30/19  0531 09/29/19  0616 09/28/19  0529   WBC 8.2 9.0 11.2*   HGB 12.6* 12.3* 13.5   MCV 94 94 92    178 181     Most Recent 3 BMP's:  Recent Labs   Lab Test 09/30/19  0531 09/29/19  0630 09/28/19  0529    145* 145*   POTASSIUM 3.9 4.2 3.6   CHLORIDE 109 112* 114*   CO2 26 26 23   BUN 15 21 19   CR 0.97 0.94 0.83   ANIONGAP 7 7 8   VICKI 8.0* 8.1* 8.2*   * 129* 157*   ,   Results for orders placed or performed during the hospital encounter of 09/27/19   X-ray lt finger 2-3 view    Narrative    LEFT FINGER TWO OR MORE VIEWS   9/28/2019 7:25 PM     HISTORY: Left finger cellulitis.    COMPARISON: None.      Impression    IMPRESSION: No acute bony abnormality. 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 Soft tissue swelling about the index  finger. No soft tissue gas.       LUCIANO BERG MD   MR Hand Left w/o & w Contrast    Narrative    MR HAND LEFT W/O & W CONTRAST 9/29/2019 9:54 AM    HISTORY:  left finger cellulitis, possible abscess, evaluate for  osteomyelitis    TECHNIQUE: Multiplanar, multisequence MRI of the left hand was  obtained before and after intravenous contrast.    COMPARISON: Radiographs from 9/28/2019    FINDINGS:   There is dorsal soft tissue swelling, most pronounced at the index  finger and to a lesser extent the middle finger. This demonstrates  enhancement on postcontrast sequences compatible with cellulitis.  There is skin blistering dorsal to the second proximal phalanx. There  patchy nonenhancement subjacent to the blister and extending  proximally over the MCP joint; this does not demonstrate simple fluid  signal and may represent a phlegmonous collection or early developing  abscess. There is a 1 cm fluid collection dorsal to the third MCP  joint which may represent a ganglion cyst although cannot exclude a  small abscess.    No abnormal  marrow signal to suggest osteomyelitis. Moderate first CMC  joint degenerative changes with cartilage thinning, subchondral cysts,  small osteophytes, and a chronic ossicle. Mild STT joint degenerative  changes. Normal joint alignment is maintained.    No significant joint effusion. No definite tendon tear.      Impression    IMPRESSION:   1.  Dorsal hand cellulitis, most pronounced at the index finger.  2.  Phlegmon versus early developing abscess along the dorsal index  finger with an overlying blister.   3.  Small fluid collection dorsal to the third MCP joint may represent  a ganglion cyst although cannot exclude a tiny abscess.  4.  No evidence of acute osteomyelitis.  5.  Moderate first CMC and mild STT joint degenerative changes.    ZELALEM LINDSEY MD   XR Chest Port 1 View    Narrative    CHEST ONE VIEW PORTABLE   9/29/2019 12:43 PM     HISTORY:  Preoperative for left hand infection.    COMPARISON: None.      Impression    IMPRESSION: Negative chest. Lungs clear.    LEIGHANN MURILLO MD       Discharge Medications   Current Discharge Medication List      START taking these medications    Details   amoxicillin-clavulanate (AUGMENTIN) 875-125 MG tablet Take 1 tablet by mouth 2 times daily for 10 days  Qty: 20 tablet, Refills: 0    Associated Diagnoses: Cellulitis of finger of left hand         CONTINUE these medications which have CHANGED    Details   oxyCODONE (ROXICODONE) 5 MG tablet Take 1-2 tablets (5-10 mg) by mouth every 4 hours as needed for moderate to severe pain  Qty: 40 tablet, Refills: 0    Associated Diagnoses: Cellulitis of finger of left hand; Insect bite of left hand with infection, initial encounter         CONTINUE these medications which have NOT CHANGED    Details   aspirin 81 MG EC tablet Take 81 mg by mouth daily      calcium carbonate (CALCIUM CARBONATE) 600 MG tablet Take 600 mg by mouth daily      cetirizine (ZYRTEC) 5 MG tablet Take 5 mg by mouth daily      !! metFORMIN (GLUCOPHAGE)  500 MG tablet Take 500 mg by mouth daily (with breakfast)      !! metFORMIN (GLUCOPHAGE) 500 MG tablet Take 1,000 mg by mouth At Bedtime       !! - Potential duplicate medications found. Please discuss with provider.      STOP taking these medications       cephALEXin (KEFLEX) 500 MG capsule Comments:   Reason for Stopping:             Allergies   No Known Allergies

## 2019-10-01 NOTE — ANESTHESIA POSTPROCEDURE EVALUATION
Patient: Andrew Alvarez    Procedure(s):  Irrigationa and Debridement Left Second Finger Wound    Diagnosis:Open wound of left forearm, initial encounter [S53.539G]  Diagnosis Additional Information: No value filed.    Anesthesia Type:  General, LMA    Note:  Anesthesia Post Evaluation    Patient location during evaluation: Bedside and Floor  Patient participation: Able to fully participate in evaluation  Level of consciousness: awake  Pain management: adequate  Airway patency: patent  Cardiovascular status: acceptable and hemodynamically stable  Respiratory status: acceptable, room air and nonlabored ventilation  Hydration status: stable  PONV: none     Anesthetic complications: None    Comments: Patient was happy with the anesthesia care received and no anesthesia related complications were noted.  I will follow up with the patient again if it is needed.        Last vitals:  Vitals:    09/30/19 0747 09/30/19 1443 09/30/19 1826   BP: 130/71 139/77 128/69   Pulse: 68 77 75   Resp: 18 18 18   Temp: 97.6  F (36.4  C) 99  F (37.2  C) 97.7  F (36.5  C)   SpO2: 91% 92% 93%         Electronically Signed By: MARCO A Duncan CRNA  September 30, 2019  7:07 PM

## 2019-10-01 NOTE — PLAN OF CARE
Left hand dressing is clean dry and intact. Pt given Oxycodone and Ibuprofen for discomfort. CMS intact. Pt denies any numbness or tingling. Blood pressure elevated but pt had been up moving around. He has been afebrile.

## 2019-10-01 NOTE — PROGRESS NOTES
[facility]    Progress note    68 year old male POD#2 s/p s/p left index finger irrigation and debridement with excision debridement by Dr. Jordan  S: Patient seen at sitting up on his bed in no apparent distress, alert and pleasant.  I discussed cultures with the patient.  He is anxious to take a shower.  He is joking today.  He states that his pain is not nearly as intense although he still has some pain.  He states his range of motion is much better.  Patient denies any fevers chills or nausea.    Pain Assessment:  Current Pain Score 9/30/2019   Patient currently in pain? -   Pain score (0-10) 3   Pain descriptors -   - Andrew is experiencing pain due to surgery. Pain management was discussed and the plan was created in a collaborative fashion.  Andrew's response to the current recommendations: engaged  compliant  - Opioid regimen: Oxycodone  - Response to opioid medications: Reduction of symptoms  - Bowel regimen: senna,fluids, activity  - Pharmacologic adjuvants: Acetaminophen  - Non-pharmacologic adjuvants: Ice and elevation  - Previous pain medications/therapies tried: IV dilaudid after surgery    O: CMS intact LUE, moving all digits without problems.       Dressing on, clean and dry        Dressing removed today.         Patient has 3 wounds on the dorsal side of the hand and second digit       The most proximal one has no erythema or.  The middle 1 or swelling has some erythema but no drainage or pus.  The most distal one has a little amount of yellowish pus but not actively draining.        Overall when compared to pictures after surgery the patient has much less erythema and swelling.       Patient has 5 out of 5  strength and able to extend all digits without problems.  I do not appreciate any increased warmth.        Wound redressed with Adaptic 4 x 4's and Ace wrap.       Bilateral calves soft and non tender    Vital signs:  Temp: 97.7  F (36.5  C) Temp src: Oral BP: (!)  "160/77 Pulse: 70 Heart Rate: 75 Resp: 20 SpO2: 93 % O2 Device: None (Room air) Oxygen Delivery: 1 LPM Height: 170.2 cm (5' 7\") Weight: 89.6 kg (197 lb 9.6 oz)  Estimated body mass index is 30.95 kg/m  as calculated from the following:    Height as of this encounter: 1.702 m (5' 7\").    Weight as of this encounter: 89.6 kg (197 lb 9.6 oz).      Hemoglobin   Date Value Ref Range Status   09/30/2019 12.6 (L) 13.3 - 17.7 g/dL Final     No results found for: INR      A/P:  1. Pain-controlled   2. DVT Prophylaxis-leg pumps.  Patient up and ambulating a lot.  3. Weight Bearing Status-WBAT left UE  4.  Cultures-still pending.  Preliminary anaerobic and fluid culture is negative preliminary wound culture is negative, still pending  5.  Antibiotics-IV vancomycin.  Plan on switching to oral when cultures come back.  6.  Infectious disease-consult if needed.   7. Hospitalist-talk to Dr. Escobar this yesterday.  We will await cultures results and decide on oral antibiotic at that time and discharge at that time to home.  8. Incision-no signs or symptoms of increasing infection compared to pictures prior to surgery and after surgery to visualizing the wounds today.  Afebrile.    9.  Dressing change: Dr. Jordan wants dressing change daily by RN Betadine/saline soak x15 minutes until follow-up.  I did a dressing change this morning however he still needs to have his soak later today.  Also okay to shower if the dressing stays dry.  9. Plan-will await cultures results.  Plan on oral antibiotic after that and discharge to home.  Follow-up with Dr. Jordan in [place] in 1 week.  Will do discharge orders after cultures come back.    [place] PA-C  10/1/2019        "

## 2019-10-01 NOTE — PROGRESS NOTES
S-(situation): Nutritional Metabolic Pattern: Skin Integrity, Wound Healing    B-(background): Patient came to hospital from ED with cellulitis of left hand and second finger on 9/27/19.    A-(assessment): A&Ox4. Denies pain. VSS. IV D/C'd.Wound is markedly improved compared to previous assessments. Patient states he is ready to go home.     R-(recommendations): Patient will go back home with his wife today after scheduling a follow-up visit with his PCP. Patient will express knowledge of prescribed antibiotics and contact provider if there are any complications or concerns with healing.

## 2019-10-01 NOTE — PROGRESS NOTES
S: Patient has indicated intent to leave the unit to smoke.    B: Cellulitis to left hand/fingers. Alert and oriented. Last Oxycodone at 0700. Up independently. Not receiving any IV narcotics.    A: Patient has indicated intent to leave the unit to smoke.    Has been notified that smoking is not allowed on the hospital premises.    Has been offered nicotine replacement therapy and smoking cessation resources, but has declined.    Has been notified that, according to policy, staff will not accompany patient off the unit for the purpose of smoking.    Has been advised of the potential safety concerns related to leaving the unit unsupervised by staff    R: Patient verbalizes understanding of education and chooses to leave the unit to smoke in spite of being advised against it, and has been requested to return within 30 minutes.

## 2019-10-04 LAB
BACTERIA SPEC CULT: NO GROWTH
BACTERIA SPEC CULT: NO GROWTH
Lab: NORMAL
Lab: NORMAL
SPECIMEN SOURCE: NORMAL
SPECIMEN SOURCE: NORMAL

## 2019-10-06 LAB
BACTERIA SPEC CULT: NORMAL
Lab: NORMAL
SPECIMEN SOURCE: NORMAL

## 2019-10-08 ENCOUNTER — OFFICE VISIT (OUTPATIENT)
Dept: INTERNAL MEDICINE | Facility: CLINIC | Age: 69
End: 2019-10-08
Payer: COMMERCIAL

## 2019-10-08 VITALS
SYSTOLIC BLOOD PRESSURE: 138 MMHG | DIASTOLIC BLOOD PRESSURE: 66 MMHG | WEIGHT: 196 LBS | TEMPERATURE: 96.6 F | OXYGEN SATURATION: 98 % | BODY MASS INDEX: 30.7 KG/M2 | RESPIRATION RATE: 16 BRPM | HEART RATE: 82 BPM

## 2019-10-08 DIAGNOSIS — Z23 NEED FOR PROPHYLACTIC VACCINATION AND INOCULATION AGAINST INFLUENZA: Primary | ICD-10-CM

## 2019-10-08 DIAGNOSIS — L03.012 CELLULITIS OF FINGER OF LEFT HAND: ICD-10-CM

## 2019-10-08 DIAGNOSIS — G47.33 OSA (OBSTRUCTIVE SLEEP APNEA): ICD-10-CM

## 2019-10-08 DIAGNOSIS — E11.9 TYPE 2 DIABETES MELLITUS WITHOUT COMPLICATION, WITHOUT LONG-TERM CURRENT USE OF INSULIN (H): ICD-10-CM

## 2019-10-08 DIAGNOSIS — F17.210 CIGARETTE SMOKER: ICD-10-CM

## 2019-10-08 PROCEDURE — G0008 ADMIN INFLUENZA VIRUS VAC: HCPCS | Performed by: INTERNAL MEDICINE

## 2019-10-08 PROCEDURE — 99203 OFFICE O/P NEW LOW 30 MIN: CPT | Mod: 25 | Performed by: INTERNAL MEDICINE

## 2019-10-08 PROCEDURE — 90662 IIV NO PRSV INCREASED AG IM: CPT | Performed by: INTERNAL MEDICINE

## 2019-10-08 RX ORDER — IBUPROFEN 200 MG
400 TABLET ORAL EVERY 6 HOURS PRN
COMMUNITY

## 2019-10-08 ASSESSMENT — PAIN SCALES - GENERAL: PAINLEVEL: MILD PAIN (2)

## 2019-10-08 NOTE — PROGRESS NOTES
Subjective     Andrew Alvarez is a 68 year old male who presents to clinic today for the following health issues:    [facility]     Hospital Follow-up Visit:    Hospital/Nursing Home/IP Rehab Facility: [facility]  Date of Admission: 9/27/19  Date of Discharge: 10/1/19  Reason(s) for Admission: Cellulitis of finger of left hand  SHOLA            Problems taking medications regularly:  None       Medication changes since discharge: None       Problems adhering to non-medication therapy:  None    Summary of hospitalization:  [facility] discharge summary reviewed  Diagnostic Tests/Treatments reviewed.  Follow up needed: none  Other Healthcare Providers Involved in Patient s Care:         None  Update since discharge: improved.     Post Discharge Medication Reconciliation: discharge medications reconciled and changed, per note/orders (see AVS).  Plan of care communicated with patient        Follow up of left hand cellulitis, happened from a spider bite or was picking up apples and had some bite and then infection. Need I&D.  Getting better, pain with bumping and aches.    He got rid of oxycodone-had side effects.  Using tylenol and ibuprofen.      Diabetes is pretty good only on metformin, a1c was 6.5    SHOLA and is on cpap    Past Medical History:   Diagnosis Date     DM2 (diabetes mellitus, type 2) (H)      Hepatitis C carrier (H)      SHOLA on CPAP      Current Outpatient Medications   Medication     amoxicillin-clavulanate (AUGMENTIN) 875-125 MG tablet     aspirin 81 MG EC tablet     calcium carbonate (CALCIUM CARBONATE) 600 MG tablet     cetirizine (ZYRTEC) 5 MG tablet     ibuprofen (ADVIL/MOTRIN) 200 MG tablet     metFORMIN (GLUCOPHAGE) 500 MG tablet     metFORMIN (GLUCOPHAGE) 500 MG tablet     No current facility-administered medications for this visit.      Social History     Tobacco Use     Smoking status: Current Every Day Smoker     Packs/day: 0.25     Smokeless tobacco: Never Used   Substance  Use Topics     Alcohol use: Not Currently     Drug use: None     Review of Systems  Constitutional-No fevers, chills, or weight changes..  Cardiac-No chest pain or palpitations.  Respiratory-No cough, sob, or hemoptysis.  GI-No nausea, vomitting, diarrhea, constipation, or blood in the stool.  Musculoskeletal-left hand is much improved.  Endocrine-No weight changes, no temp variances and Sugars are stable.    Physical Exam  /66   Pulse 82   Temp 96.6  F (35.9  C) (Temporal)   Resp 16   Wt 88.9 kg (196 lb)   SpO2 98%   BMI 30.70 kg/m    General Appearance-healthy, alert, no distress  Cardiac-regular rate and rhythm  with normal S1, S2 ; no murmur, rub or gallops  Lungs-clear to auscultation  Musculoskeletal-right hand has improved flexion, function, movement.   Positive pulses       ASSESSMENT:  This is a new patient here to establish care and follow-up of hospitalization.  He used to use care in [place].  He is now living in [place].    Problem #1 left hand cellulitis, he had some sort of bite when he is picking up apples possibly spider or hornet.  Patient developed a severe cellulitis needed incision and drainage which was done by Dr. Jordan.  He was on Vanco and Zosyn.  Discharged on Augmentin.  He is getting better, is much better movement, less pain and no fevers.  The patient will follow up with his surgeon in 2 days.    Problem #2 type 2 diabetes the patient is on metformin doing well last A1c was 6.5, cholesterol was slightly high and he should go on a statin.  We will recheck with him in 6 months.    Problem #3 tobacco abuse I strongly told him he cannot smoke anymore this is the worst thing for the wound, small vessel disease is getting worse with nicotine.  He will try to quit.  Recommended nicotine lozenges or gum.    Problem #4 obstructive sleep apnea he is on CPAP and uses it regularly.    Follow-up in 6 months.    Electronically signed by Kentrell Eckert MD

## 2019-10-10 ENCOUNTER — OFFICE VISIT (OUTPATIENT)
Dept: ORTHOPEDICS | Facility: OTHER | Age: 69
End: 2019-10-10
Payer: COMMERCIAL

## 2019-10-10 VITALS
DIASTOLIC BLOOD PRESSURE: 94 MMHG | HEART RATE: 77 BPM | RESPIRATION RATE: 16 BRPM | HEIGHT: 67 IN | BODY MASS INDEX: 30.76 KG/M2 | SYSTOLIC BLOOD PRESSURE: 177 MMHG | WEIGHT: 196 LBS

## 2019-10-10 DIAGNOSIS — L03.012 CELLULITIS OF FINGER OF LEFT HAND: ICD-10-CM

## 2019-10-10 PROCEDURE — 99213 OFFICE O/P EST LOW 20 MIN: CPT | Performed by: ORTHOPAEDIC SURGERY

## 2019-10-10 ASSESSMENT — MIFFLIN-ST. JEOR: SCORE: 1617.68

## 2019-10-10 NOTE — PROGRESS NOTES
Follow-up I&D of left index finger and hand infection on 9/29/19.  He had had a bee sting about 9/24 that had developed into this infection.  After he is irrigation debridement he has been on Augmentin for the last 9 days.  Cultures did not show any growth from surgery.  He had been on oral antibiotics by then for 4 days.  He has been doing soaks of the hand.  He has been off work.  He is anxious to get back to work.    Exam shows persistent erythema of the index finger and hand.  There is minimal increased warmth.  I left his 2 incisions open and these are slowly healing.  They are crusted over with no drainage at this time.  He lacks full motion of the finger.    Assessment: Left index finger and hand infection which is slowly improving.  I feel it would require more antibiotic.  We do not have positive culture to guide treatment.  I will renew Augmentin.  I want him seen in 2 weeks, but he would like to avoid taking a day off.  His free days are Tuesday and Wednesday, so he will see Porter Lee in 2 weeks

## 2019-10-10 NOTE — LETTER
10/10/2019         RE: Andrew Alvarez  [address] [place] [address]        Dear Colleague,    Thank you for referring your patient, Andrew Alvarez, to the [facility]. Please see a copy of my visit note below.    Follow-up I&D of left index finger and hand infection on 9/29/19.  He had had a bee sting about 9/24 that had developed into this infection.  After he is irrigation debridement he has been on Augmentin for the last 9 days.  Cultures did not show any growth from surgery.  He had been on oral antibiotics by then for 4 days.  He has been doing soaks of the hand.  He has been off work.  He is anxious to get back to work.    Exam shows persistent erythema of the index finger and hand.  There is minimal increased warmth.  I left his 2 incisions open and these are slowly healing.  They are crusted over with no drainage at this time.  He lacks full motion of the finger.    Assessment: Left index finger and hand infection which is slowly improving.  I feel it would require more antibiotic.  We do not have positive culture to guide treatment.  I will renew Augmentin.  I want him seen in 2 weeks, but he would like to avoid taking a day off.  His free days are Tuesday and Wednesday, so he will see Porter Lee in 2 weeks    Again, thank you for allowing me to participate in the care of your patient.        Sincerely,        Jaime Jordan MD

## 2019-10-23 ENCOUNTER — OFFICE VISIT (OUTPATIENT)
Dept: ORTHOPEDICS | Facility: CLINIC | Age: 69
End: 2019-10-23
Payer: COMMERCIAL

## 2019-10-23 VITALS
HEIGHT: 67 IN | WEIGHT: 199 LBS | SYSTOLIC BLOOD PRESSURE: 154 MMHG | DIASTOLIC BLOOD PRESSURE: 84 MMHG | BODY MASS INDEX: 31.23 KG/M2

## 2019-10-23 DIAGNOSIS — L03.012 CELLULITIS OF FINGER OF LEFT HAND: Primary | ICD-10-CM

## 2019-10-23 PROCEDURE — 99213 OFFICE O/P EST LOW 20 MIN: CPT | Performed by: PHYSICIAN ASSISTANT

## 2019-10-23 ASSESSMENT — MIFFLIN-ST. JEOR: SCORE: 1631.29

## 2019-10-23 NOTE — LETTER
10/23/2019         RE: Andrew Alvarez  [address] [place] [address]        Dear Colleague,    Thank you for referring your patient, Andrew Alvarez, to the [facility]. Please see a copy of my visit note below.    Orthopedic Clinic Post-Operative Note    CHIEF COMPLAINT:   Chief Complaint   Patient presents with     Surgical Followup     Follow-up I&D of left index finger and hand infection on 9/29/19       HISTORY OF PRESENT ILLNESS  Location: Left base of second digit on the dorsal side.  Rating of Pain: No pain  Pain is better with: Antibiotics and rest  Pain improving overall: Definitely  Associated Features: Patient denies any fevers chills nausea or night sweats.  Patient feels his range of motion is getting better and his pain is gone away.  Patient concerns: No concerns.  Patient would rather not have the Augmentin if possible because of the diarrhea issues.  Additional History: Patient not having any complaints.    Patient's past medical, surgical, social and family histories reviewed.     Past Medical History:   Diagnosis Date     DM2 (diabetes mellitus, type 2) (H)      Hepatitis C carrier (H)      SHOLA on CPAP        Past Surgical History:   Procedure Laterality Date     IRRIGATION AND DEBRIDEMENT UPPER EXTREMITY, COMBINED Left 9/29/2019    Procedure: Irrigationa and Debridement Left Second Finger Wound;  Surgeon: Jaime Jordan MD;  Location: [facility]     SHOULDER SURGERY  2000     TONSILLECTOMY & ADENOIDECTOMY  1990       Medications:  aspirin 81 MG EC tablet, Take 81 mg by mouth daily  calcium carbonate (CALCIUM CARBONATE) 600 MG tablet, Take 600 mg by mouth daily  cetirizine (ZYRTEC) 5 MG tablet, Take 5 mg by mouth daily  ibuprofen (ADVIL/MOTRIN) 200 MG tablet, Take 200 mg by mouth as needed for mild pain  metFORMIN (GLUCOPHAGE) 500 MG tablet, Take 500 mg by mouth daily (with breakfast)  metFORMIN (GLUCOPHAGE) 500 MG tablet, Take 1,000 mg by mouth At Bedtime    No  "current facility-administered medications on file prior to visit.       No Known Allergies    Social History     Occupational History     Not on file   Tobacco Use     Smoking status: Current Every Day Smoker     Packs/day: 0.25     Smokeless tobacco: Never Used   Substance and Sexual Activity     Alcohol use: Not Currently     Drug use: Not on file     Sexual activity: Not Currently     Partners: Female       Family History   Problem Relation Age of Onset     Diabetes Mother      Heart Disease Father      Cerebrovascular Disease Father         hx of stroke       REVIEW OF SYSTEMS  General: negative for, night sweats, dizziness, fatigue  Resp: No shortness of breath and no cough  CV: negative for chest pain, syncope or near-syncope  GI: negative for nausea, vomiting and diarrhea  : negative for dysuria and hematuria  Musculoskeletal: as above  Neurologic: negative for syncope   Hematologic: negative for bleeding disorder    Physical Exam:  Vitals: BP (!) 154/84   Ht 1.702 m (5' 7\")   Wt 90.3 kg (199 lb)   BMI 31.17 kg/m     BMI= Body mass index is 31.17 kg/m .  Constitutional: healthy, alert and no acute distress   Psychiatric: mentation appears normal and affect normal/bright  NEURO: no focal deficits, CMS intact left upper extremity   RESP: Normal with easy respirations and no use of accessory muscles to breathe, no audible wheezing or retractions  CV: +2 radial pulse and his hand is warm to palpation, capillary refill less than 2 seconds.  SKIN: And anoxia  This morning but letter just does have erythema around the mid to base of it there also extends to the webspace of the first and second digit.  A small area of eschar on the dorsal side of the base of the second digit as well as on the dorsal side of the hand just proximal fingers in order other eschar.  No evidence of progressing infection.  The rest of the hand and forearm there is No erythema, rashes, excoriation, or breakdown. "     MUSCULOSKELETAL:    INSPECTION of left index finger: No gross deformities, erythema, edema, ecchymosis, atrophy or fasciculations.     PALPATION: No tenderness to palpation left index finger.  No tenderness to palpation over the swelling, except for one spot on the proximal phalanx and that was not all that tender at all.  No increased warmth is noted when palpated and compared to the contralateral side.    ROM: Patient has full extension and flexion to approximately 2 cm to 1 cm away from the palm of the hand.  Patient has difficulty with this secondary to swelling but does not have pain with it.     STRENGTH: 5 out of 5 , interosseous and thumb strength without pain.  He is able to do all the strength testing without any pain.    SPECIAL TEST: None  GAIT: non-antalgic  Lymph: no palpable lymph nodes    Diagnostic Modalities:  Recent Results (from the past 744 hour(s))   X-ray lt finger 2-3 view    Narrative    LEFT FINGER TWO OR MORE VIEWS   9/28/2019 7:25 PM     HISTORY: Left finger cellulitis.    COMPARISON: None.      Impression    IMPRESSION: No acute bony abnormality. 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 Soft tissue swelling about the index  finger. No soft tissue gas.       LUCIANO BERG MD   MR Hand Left w/o & w Contrast    Narrative    MR HAND LEFT W/O & W CONTRAST 9/29/2019 9:54 AM    HISTORY:  left finger cellulitis, possible abscess, evaluate for  osteomyelitis    TECHNIQUE: Multiplanar, multisequence MRI of the left hand was  obtained before and after intravenous contrast.    COMPARISON: Radiographs from 9/28/2019    FINDINGS:   There is dorsal soft tissue swelling, most pronounced at the index  finger and to a lesser extent the middle finger. This demonstrates  enhancement on postcontrast sequences compatible with cellulitis.  There is skin blistering dorsal to the second proximal phalanx. There  patchy nonenhancement subjacent to the blister and  extending  proximally over the MCP joint; this does not demonstrate simple fluid  signal and may represent a phlegmonous collection or early developing  abscess. There is a 1 cm fluid collection dorsal to the third MCP  joint which may represent a ganglion cyst although cannot exclude a  small abscess.    No abnormal marrow signal to suggest osteomyelitis. Moderate first CMC  joint degenerative changes with cartilage thinning, subchondral cysts,  small osteophytes, and a chronic ossicle. Mild STT joint degenerative  changes. Normal joint alignment is maintained.    No significant joint effusion. No definite tendon tear.      Impression    IMPRESSION:   1.  Dorsal hand cellulitis, most pronounced at the index finger.  2.  Phlegmon versus early developing abscess along the dorsal index  finger with an overlying blister.   3.  Small fluid collection dorsal to the third MCP joint may represent  a ganglion cyst although cannot exclude a tiny abscess.  4.  No evidence of acute osteomyelitis.  5.  Moderate first CMC and mild STT joint degenerative changes.    ZELALEM LINDSEY MD     I agree with the above readings.    No radiographs necessary today    Independent visualization of the images was performed.      Impression: 1.  24 days status post left index finger irrigation debridement by Dr. Jordan    Plan:   Patient Instructions:   1.  Exam: Your hand looks excellent compared to when I saw you in the hospital.  I did not see you 2 weeks ago but as you stated it looks much better and feels much better than 2 weeks ago.  You are now able to move your finger much better and make a fist much better.  You have some redness still and some swelling but it is very minimal and it is heading in the right direction.  2.  Antibiotics: You took your last Augmentin this morning.  I do not believe you will need any more Augmentin.  We also know it gives you diarrhea.  Since her hand is going in the right direction.  We did review  signs of infection and if you have any increased redness pain swelling or any drainage then we would have you call us and we could put you back on antibiotics or see you.  3.  Therapy: We did talk about hand therapy and they can do a really good job of getting the swelling out of your hand and also getting her full range of motion back.  You want to work on exercises on your own and in a week or 2 if you do not get it back then he will call us and we can order therapy at that time.  4.  It sounds like he lives in [place] would like to follow-up closer than further away.  Dr. Jordan is in [place] and he would rather follow-up here and that is okay.  I would say follow-up in 2 weeks with Bay Lee PA-C only if you are having any problems.  Re-x-ray on return: No    BP Readings from Last 1 Encounters:   10/23/19 (!) 154/84       Andrew to follow up with Primary Care provider regarding elevated blood pressure.     Patient does use Tobacco products. Patient not ready to quit at this time.  Strongly encouraged smoking cessation.  Risks of smoking and benefits of quitting were discussed.  Information offered: AVS with information about stopping smoking and advised to discuss smoking cessation medications/strategies with Primary care provider.  3-5 Minutes were spent in counseling.    This note was dictated with [employer].    Bay Lee PA-C             Again, thank you for allowing me to participate in the care of your patient.        Sincerely,        Bay Lee PA-C

## 2019-10-23 NOTE — PROGRESS NOTES
Orthopedic Clinic Post-Operative Note    CHIEF COMPLAINT:   Chief Complaint   Patient presents with     Surgical Followup     Follow-up I&D of left index finger and hand infection on 9/29/19       HISTORY OF PRESENT ILLNESS  Location: Left base of second digit on the dorsal side.  Rating of Pain: No pain  Pain is better with: Antibiotics and rest  Pain improving overall: Definitely  Associated Features: Patient denies any fevers chills nausea or night sweats.  Patient feels his range of motion is getting better and his pain is gone away.  Patient concerns: No concerns.  Patient would rather not have the Augmentin if possible because of the diarrhea issues.  Additional History: Patient not having any complaints.    Patient's past medical, surgical, social and family histories reviewed.     Past Medical History:   Diagnosis Date     DM2 (diabetes mellitus, type 2) (H)      Hepatitis C carrier (H)      SHOLA on CPAP        Past Surgical History:   Procedure Laterality Date     IRRIGATION AND DEBRIDEMENT UPPER EXTREMITY, COMBINED Left 9/29/2019    Procedure: Irrigationa and Debridement Left Second Finger Wound;  Surgeon: Jaime Jordan MD;  Location: [facility]     SHOULDER SURGERY  2000     TONSILLECTOMY & ADENOIDECTOMY  1990       Medications:  aspirin 81 MG EC tablet, Take 81 mg by mouth daily  calcium carbonate (CALCIUM CARBONATE) 600 MG tablet, Take 600 mg by mouth daily  cetirizine (ZYRTEC) 5 MG tablet, Take 5 mg by mouth daily  ibuprofen (ADVIL/MOTRIN) 200 MG tablet, Take 200 mg by mouth as needed for mild pain  metFORMIN (GLUCOPHAGE) 500 MG tablet, Take 500 mg by mouth daily (with breakfast)  metFORMIN (GLUCOPHAGE) 500 MG tablet, Take 1,000 mg by mouth At Bedtime    No current facility-administered medications on file prior to visit.       No Known Allergies    Social History     Occupational History     Not on file   Tobacco Use     Smoking status: Current Every Day Smoker     Packs/day: 0.25     Smokeless  "tobacco: Never Used   Substance and Sexual Activity     Alcohol use: Not Currently     Drug use: Not on file     Sexual activity: Not Currently     Partners: Female       Family History   Problem Relation Age of Onset     Diabetes Mother      Heart Disease Father      Cerebrovascular Disease Father         hx of stroke       REVIEW OF SYSTEMS  General: negative for, night sweats, dizziness, fatigue  Resp: No shortness of breath and no cough  CV: negative for chest pain, syncope or near-syncope  GI: negative for nausea, vomiting and diarrhea  : negative for dysuria and hematuria  Musculoskeletal: as above  Neurologic: negative for syncope   Hematologic: negative for bleeding disorder    Physical Exam:  Vitals: BP (!) 154/84   Ht 1.702 m (5' 7\")   Wt 90.3 kg (199 lb)   BMI 31.17 kg/m    BMI= Body mass index is 31.17 kg/m .  Constitutional: healthy, alert and no acute distress   Psychiatric: mentation appears normal and affect normal/bright  NEURO: no focal deficits, CMS intact left upper extremity   RESP: Normal with easy respirations and no use of accessory muscles to breathe, no audible wheezing or retractions  CV: +2 radial pulse and his hand is warm to palpation, capillary refill less than 2 seconds.  SKIN: And anoxia  This morning but letter just does have erythema around the mid to base of it there also extends to the webspace of the first and second digit.  A small area of eschar on the dorsal side of the base of the second digit as well as on the dorsal side of the hand just proximal fingers in order other eschar.  No evidence of progressing infection.  The rest of the hand and forearm there is No erythema, rashes, excoriation, or breakdown.     MUSCULOSKELETAL:    INSPECTION of left index finger: No gross deformities, erythema, edema, ecchymosis, atrophy or fasciculations.     PALPATION: No tenderness to palpation left index finger.  No tenderness to palpation over the swelling, except for one spot on " the proximal phalanx and that was not all that tender at all.  No increased warmth is noted when palpated and compared to the contralateral side.    ROM: Patient has full extension and flexion to approximately 2 cm to 1 cm away from the palm of the hand.  Patient has difficulty with this secondary to swelling but does not have pain with it.     STRENGTH: 5 out of 5 , interosseous and thumb strength without pain.  He is able to do all the strength testing without any pain.    SPECIAL TEST: None  GAIT: non-antalgic  Lymph: no palpable lymph nodes    Diagnostic Modalities:  Recent Results (from the past 744 hour(s))   X-ray lt finger 2-3 view    Narrative    LEFT FINGER TWO OR MORE VIEWS   9/28/2019 7:25 PM     HISTORY: Left finger cellulitis.    COMPARISON: None.      Impression    IMPRESSION: No acute bony abnormality. 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 Soft tissue swelling about the index  finger. No soft tissue gas.       LUCIANO BERG MD   MR Hand Left w/o & w Contrast    Narrative    MR HAND LEFT W/O & W CONTRAST 9/29/2019 9:54 AM    HISTORY:  left finger cellulitis, possible abscess, evaluate for  osteomyelitis    TECHNIQUE: Multiplanar, multisequence MRI of the left hand was  obtained before and after intravenous contrast.    COMPARISON: Radiographs from 9/28/2019    FINDINGS:   There is dorsal soft tissue swelling, most pronounced at the index  finger and to a lesser extent the middle finger. This demonstrates  enhancement on postcontrast sequences compatible with cellulitis.  There is skin blistering dorsal to the second proximal phalanx. There  patchy nonenhancement subjacent to the blister and extending  proximally over the MCP joint; this does not demonstrate simple fluid  signal and may represent a phlegmonous collection or early developing  abscess. There is a 1 cm fluid collection dorsal to the third MCP  joint which may represent a ganglion cyst although cannot  exclude a  small abscess.    No abnormal marrow signal to suggest osteomyelitis. Moderate first CMC  joint degenerative changes with cartilage thinning, subchondral cysts,  small osteophytes, and a chronic ossicle. Mild STT joint degenerative  changes. Normal joint alignment is maintained.    No significant joint effusion. No definite tendon tear.      Impression    IMPRESSION:   1.  Dorsal hand cellulitis, most pronounced at the index finger.  2.  Phlegmon versus early developing abscess along the dorsal index  finger with an overlying blister.   3.  Small fluid collection dorsal to the third MCP joint may represent  a ganglion cyst although cannot exclude a tiny abscess.  4.  No evidence of acute osteomyelitis.  5.  Moderate first CMC and mild STT joint degenerative changes.    ZELALEM LINDSEY MD     I agree with the above readings.    No radiographs necessary today    Independent visualization of the images was performed.      Impression: 1.  24 days status post left index finger irrigation debridement by Dr. Jordan    Plan:   Patient Instructions:   1.  Exam: Your hand looks excellent compared to when I saw you in the hospital.  I did not see you 2 weeks ago but as you stated it looks much better and feels much better than 2 weeks ago.  You are now able to move your finger much better and make a fist much better.  You have some redness still and some swelling but it is very minimal and it is heading in the right direction.  2.  Antibiotics: You took your last Augmentin this morning.  I do not believe you will need any more Augmentin.  We also know it gives you diarrhea.  Since her hand is going in the right direction.  We did review signs of infection and if you have any increased redness pain swelling or any drainage then we would have you call us and we could put you back on antibiotics or see you.  3.  Therapy: We did talk about hand therapy and they can do a really good job of getting the swelling out of  your hand and also getting her full range of motion back.  You want to work on exercises on your own and in a week or 2 if you do not get it back then he will call us and we can order therapy at that time.  4.  It sounds like he lives in [place] would like to follow-up closer than further away.  Dr. Jordan is in [place] and he would rather follow-up here and that is okay.  I would say follow-up in 2 weeks with Bay Lee PA-C only if you are having any problems.  Re-x-ray on return: No    BP Readings from Last 1 Encounters:   10/23/19 (!) 154/84       Andrew to follow up with Primary Care provider regarding elevated blood pressure.     Patient does use Tobacco products. Patient not ready to quit at this time.  Strongly encouraged smoking cessation.  Risks of smoking and benefits of quitting were discussed.  Information offered: AVS with information about stopping smoking and advised to discuss smoking cessation medications/strategies with Primary care provider.  3-5 Minutes were spent in counseling.    This note was dictated with [facility].    Bay Lee PA-C

## 2019-10-23 NOTE — PATIENT INSTRUCTIONS
Encounter Diagnosis   Name Primary?     Cellulitis of finger of left hand Yes     Rest, ice and elevate above heart level as needed for pain control  1.  Exam: Your hand looks excellent compared to when I saw you in the hospital.  I did not see you 2 weeks ago but as you stated it looks much better and feels much better than 2 weeks ago.  You are now able to move your finger much better and make a fist much better.  You have some redness still and some swelling but it is very minimal and it is heading in the right direction.  2.  Antibiotics: You took your last Augmentin this morning.  I do not believe you will need any more Augmentin.  We also know it gives you diarrhea.  Since her hand is going in the right direction.  We did review signs of infection and if you have any increased redness pain swelling or any drainage then we would have you call us and we could put you back on antibiotics or see you.  3.  Therapy: We did talk about hand therapy and they can do a really good job of getting the swelling out of your hand and also getting her full range of motion back.  You want to work on exercises on your own and in a week or 2 if you do not get it back then he will call us and we can order therapy at that time.  4.  It sounds like he lives in [place] would like to follow-up closer than further away.  Dr. Jordan is in [place] and he would rather follow-up here and that is okay.  I would say follow-up in 2 weeks with Bay Lee PA-C only if you are having any problems.  [employer] and [employer] may offer reliable information regarding your diagnosis and treatment plan.    THANK YOU for coming in today. If you receive a survey via [id number] or mail please let us know if there was anything you especially appreciated today or if there is any way we can improve our clinic. We appreciate your input.    GENERAL INFORMATION:  Our hours are:  (Pending)    [facility] for any issues or concerns:  [phone]      We are not in the office Thursdays. Therefore non- urgent calls and medical messages received on Thursday will be addressed when we are back in the office on Wednesday. Urgent matters will be reviewed and addressed by one of our partners in the office as needed.    If lab work was done today as part of your evaluation you will generally be contacted via [employer], mail, or phone with the results within 1-5 days. If there is an alarming result we will contact you by phone. Lab results come back at varying times, I generally wait until all labs are resulted before making comments on results. Please note labs are automatically released to [employer] (if you have signed up for it) once available-at times you may see these prior to my having a chance to review them as well.    If you need refills please contact your pharmacist. They will send a refill request to me to review. Please allow 3 business days for us to process all refill requests. All narcotic refills should be handled in the clinic at the time of your visit.

## 2020-01-21 ENCOUNTER — MEDICAL CORRESPONDENCE (OUTPATIENT)
Dept: HEALTH INFORMATION MANAGEMENT | Facility: CLINIC | Age: 70
End: 2020-01-21

## 2020-01-24 ENCOUNTER — TRANSFERRED RECORDS (OUTPATIENT)
Dept: HEALTH INFORMATION MANAGEMENT | Facility: CLINIC | Age: 70
End: 2020-01-24

## 2021-01-01 ENCOUNTER — TELEPHONE (OUTPATIENT)
Dept: INTERNAL MEDICINE | Facility: CLINIC | Age: 71
End: 2021-01-01

## 2021-01-01 ENCOUNTER — HEALTH MAINTENANCE LETTER (OUTPATIENT)
Age: 71
End: 2021-01-01

## 2021-01-01 ENCOUNTER — OFFICE VISIT (OUTPATIENT)
Dept: FAMILY MEDICINE | Facility: CLINIC | Age: 71
End: 2021-01-01
Payer: COMMERCIAL

## 2021-01-01 ENCOUNTER — APPOINTMENT (OUTPATIENT)
Dept: GENERAL RADIOLOGY | Facility: CLINIC | Age: 71
End: 2021-01-01
Attending: EMERGENCY MEDICINE
Payer: MEDICARE

## 2021-01-01 ENCOUNTER — TRANSFERRED RECORDS (OUTPATIENT)
Dept: HEALTH INFORMATION MANAGEMENT | Facility: CLINIC | Age: 71
End: 2021-01-01
Payer: COMMERCIAL

## 2021-01-01 ENCOUNTER — HOSPITAL ENCOUNTER (EMERGENCY)
Facility: CLINIC | Age: 71
Discharge: HOME | End: 2021-03-06
Attending: FAMILY MEDICINE | Admitting: FAMILY MEDICINE
Payer: MEDICARE

## 2021-01-01 ENCOUNTER — TELEPHONE (OUTPATIENT)
Dept: EMERGENCY MEDICINE | Facility: CLINIC | Age: 71
End: 2021-01-01

## 2021-01-01 ENCOUNTER — APPOINTMENT (OUTPATIENT)
Dept: GENERAL RADIOLOGY | Facility: CLINIC | Age: 71
DRG: 208 | End: 2021-01-01
Attending: INTERNAL MEDICINE
Payer: MEDICARE

## 2021-01-01 ENCOUNTER — ALLIED HEALTH/NURSE VISIT (OUTPATIENT)
Dept: FAMILY MEDICINE | Facility: CLINIC | Age: 71
End: 2021-01-01
Payer: COMMERCIAL

## 2021-01-01 ENCOUNTER — HOSPITAL ENCOUNTER (EMERGENCY)
Facility: CLINIC | Age: 71
Discharge: ANOTHER HOSPITAL | End: 2021-12-05
Attending: EMERGENCY MEDICINE | Admitting: EMERGENCY MEDICINE
Payer: MEDICARE

## 2021-01-01 ENCOUNTER — MYC MEDICAL ADVICE (OUTPATIENT)
Dept: INTERNAL MEDICINE | Facility: CLINIC | Age: 71
End: 2021-01-01
Payer: COMMERCIAL

## 2021-01-01 ENCOUNTER — NURSE TRIAGE (OUTPATIENT)
Dept: INTERNAL MEDICINE | Facility: CLINIC | Age: 71
End: 2021-01-01
Payer: COMMERCIAL

## 2021-01-01 ENCOUNTER — APPOINTMENT (OUTPATIENT)
Dept: CT IMAGING | Facility: CLINIC | Age: 71
End: 2021-01-01
Attending: EMERGENCY MEDICINE
Payer: MEDICARE

## 2021-01-01 ENCOUNTER — OFFICE VISIT (OUTPATIENT)
Dept: INTERNAL MEDICINE | Facility: CLINIC | Age: 71
End: 2021-01-01
Payer: COMMERCIAL

## 2021-01-01 ENCOUNTER — VIRTUAL VISIT (OUTPATIENT)
Dept: INTERNAL MEDICINE | Facility: CLINIC | Age: 71
End: 2021-01-01
Payer: COMMERCIAL

## 2021-01-01 ENCOUNTER — HOSPITAL ENCOUNTER (INPATIENT)
Facility: CLINIC | Age: 71
LOS: 3 days | DRG: 208 | End: 2021-12-08
Attending: INTERNAL MEDICINE | Admitting: INTERNAL MEDICINE
Payer: MEDICARE

## 2021-01-01 ENCOUNTER — ANESTHESIA EVENT (OUTPATIENT)
Dept: EMERGENCY MEDICINE | Facility: CLINIC | Age: 71
End: 2021-01-01
Payer: MEDICARE

## 2021-01-01 ENCOUNTER — VIRTUAL VISIT (OUTPATIENT)
Dept: SLEEP MEDICINE | Facility: CLINIC | Age: 71
End: 2021-01-01
Payer: COMMERCIAL

## 2021-01-01 ENCOUNTER — ANESTHESIA (OUTPATIENT)
Dept: EMERGENCY MEDICINE | Facility: CLINIC | Age: 71
End: 2021-01-01
Payer: MEDICARE

## 2021-01-01 VITALS
BODY MASS INDEX: 30.34 KG/M2 | TEMPERATURE: 97.2 F | SYSTOLIC BLOOD PRESSURE: 91 MMHG | HEART RATE: 71 BPM | OXYGEN SATURATION: 95 % | RESPIRATION RATE: 22 BRPM | DIASTOLIC BLOOD PRESSURE: 56 MMHG | WEIGHT: 188 LBS

## 2021-01-01 VITALS
OXYGEN SATURATION: 95 % | WEIGHT: 188 LBS | TEMPERATURE: 97.3 F | SYSTOLIC BLOOD PRESSURE: 126 MMHG | HEART RATE: 82 BPM | BODY MASS INDEX: 30.34 KG/M2 | RESPIRATION RATE: 16 BRPM | DIASTOLIC BLOOD PRESSURE: 68 MMHG

## 2021-01-01 VITALS
TEMPERATURE: 98.1 F | SYSTOLIC BLOOD PRESSURE: 137 MMHG | OXYGEN SATURATION: 94 % | HEART RATE: 80 BPM | RESPIRATION RATE: 16 BRPM | BODY MASS INDEX: 31.56 KG/M2 | WEIGHT: 201.5 LBS | DIASTOLIC BLOOD PRESSURE: 82 MMHG

## 2021-01-01 VITALS
SYSTOLIC BLOOD PRESSURE: 68 MMHG | RESPIRATION RATE: 30 BRPM | WEIGHT: 172.18 LBS | BODY MASS INDEX: 27.79 KG/M2 | OXYGEN SATURATION: 87 % | DIASTOLIC BLOOD PRESSURE: 45 MMHG | TEMPERATURE: 102.4 F

## 2021-01-01 VITALS
HEART RATE: 92 BPM | WEIGHT: 195 LBS | BODY MASS INDEX: 31.47 KG/M2 | TEMPERATURE: 97.9 F | DIASTOLIC BLOOD PRESSURE: 68 MMHG | OXYGEN SATURATION: 96 % | RESPIRATION RATE: 16 BRPM | SYSTOLIC BLOOD PRESSURE: 138 MMHG

## 2021-01-01 VITALS
HEART RATE: 85 BPM | RESPIRATION RATE: 20 BRPM | TEMPERATURE: 97.3 F | DIASTOLIC BLOOD PRESSURE: 84 MMHG | BODY MASS INDEX: 31.06 KG/M2 | WEIGHT: 193.25 LBS | HEIGHT: 66 IN | SYSTOLIC BLOOD PRESSURE: 130 MMHG | OXYGEN SATURATION: 98 %

## 2021-01-01 VITALS
HEART RATE: 82 BPM | SYSTOLIC BLOOD PRESSURE: 146 MMHG | WEIGHT: 199 LBS | RESPIRATION RATE: 16 BRPM | DIASTOLIC BLOOD PRESSURE: 85 MMHG | OXYGEN SATURATION: 95 % | BODY MASS INDEX: 31.17 KG/M2 | TEMPERATURE: 98.4 F

## 2021-01-01 DIAGNOSIS — E11.9 TYPE 2 DIABETES MELLITUS WITHOUT COMPLICATION, WITHOUT LONG-TERM CURRENT USE OF INSULIN (H): ICD-10-CM

## 2021-01-01 DIAGNOSIS — F17.210 CIGARETTE SMOKER: ICD-10-CM

## 2021-01-01 DIAGNOSIS — G47.33 OSA (OBSTRUCTIVE SLEEP APNEA): Primary | ICD-10-CM

## 2021-01-01 DIAGNOSIS — L03.211 FACIAL CELLULITIS: ICD-10-CM

## 2021-01-01 DIAGNOSIS — U07.1 PNEUMONIA DUE TO 2019 NOVEL CORONAVIRUS: ICD-10-CM

## 2021-01-01 DIAGNOSIS — Z09 HOSPITAL DISCHARGE FOLLOW-UP: Primary | ICD-10-CM

## 2021-01-01 DIAGNOSIS — J12.82 PNEUMONIA DUE TO 2019 NOVEL CORONAVIRUS: ICD-10-CM

## 2021-01-01 DIAGNOSIS — R19.7 DIARRHEA, UNSPECIFIED TYPE: ICD-10-CM

## 2021-01-01 DIAGNOSIS — I10 ESSENTIAL HYPERTENSION: ICD-10-CM

## 2021-01-01 DIAGNOSIS — B18.2 CHRONIC HEPATITIS C WITHOUT HEPATIC COMA (H): ICD-10-CM

## 2021-01-01 DIAGNOSIS — E11.9 TYPE 2 DIABETES MELLITUS WITHOUT COMPLICATION, WITHOUT LONG-TERM CURRENT USE OF INSULIN (H): Primary | ICD-10-CM

## 2021-01-01 DIAGNOSIS — L03.211 CELLULITIS OF FACE: ICD-10-CM

## 2021-01-01 DIAGNOSIS — E78.5 HYPERLIPIDEMIA LDL GOAL <100: ICD-10-CM

## 2021-01-01 DIAGNOSIS — G47.33 OSA (OBSTRUCTIVE SLEEP APNEA): ICD-10-CM

## 2021-01-01 DIAGNOSIS — R05.9 COUGH: Primary | ICD-10-CM

## 2021-01-01 DIAGNOSIS — Z00.00 MEDICARE ANNUAL WELLNESS VISIT, SUBSEQUENT: Primary | ICD-10-CM

## 2021-01-01 DIAGNOSIS — R09.02 HYPOXIA: ICD-10-CM

## 2021-01-01 DIAGNOSIS — Z87.891 PERSONAL HISTORY OF TOBACCO USE: ICD-10-CM

## 2021-01-01 DIAGNOSIS — L01.00 IMPETIGO: Primary | ICD-10-CM

## 2021-01-01 DIAGNOSIS — Z00.00 HEALTHCARE MAINTENANCE: ICD-10-CM

## 2021-01-01 DIAGNOSIS — J96.01 ACUTE RESPIRATORY FAILURE WITH HYPOXIA (H): ICD-10-CM

## 2021-01-01 DIAGNOSIS — Z72.0 TOBACCO ABUSE: ICD-10-CM

## 2021-01-01 LAB
ACINETOBACTER SPECIES: NOT DETECTED
ALBUMIN SERPL-MCNC: 1 G/DL (ref 3.4–5)
ALBUMIN SERPL-MCNC: 1.8 G/DL (ref 3.4–5)
ALBUMIN SERPL-MCNC: 2.2 G/DL (ref 3.4–5)
ALBUMIN SERPL-MCNC: 2.7 G/DL (ref 3.4–5)
ALBUMIN SERPL-MCNC: 3.6 G/DL (ref 3.4–5)
ALBUMIN UR-MCNC: 100 MG/DL
ALBUMIN UR-MCNC: 70 MG/DL
ALP SERPL-CCNC: 40 U/L (ref 40–150)
ALP SERPL-CCNC: 44 U/L (ref 40–150)
ALP SERPL-CCNC: 46 U/L (ref 40–150)
ALP SERPL-CCNC: 47 U/L (ref 40–150)
ALP SERPL-CCNC: 66 U/L (ref 40–150)
ALT SERPL W P-5'-P-CCNC: 19 U/L (ref 0–70)
ALT SERPL W P-5'-P-CCNC: 21 U/L (ref 0–70)
ALT SERPL W P-5'-P-CCNC: 21 U/L (ref 0–70)
ALT SERPL W P-5'-P-CCNC: 22 U/L (ref 0–70)
ALT SERPL W P-5'-P-CCNC: 43 U/L (ref 0–70)
ANION GAP SERPL CALCULATED.3IONS-SCNC: 2 MMOL/L (ref 3–14)
ANION GAP SERPL CALCULATED.3IONS-SCNC: 4 MMOL/L (ref 3–14)
ANION GAP SERPL CALCULATED.3IONS-SCNC: 5 MMOL/L (ref 3–14)
ANION GAP SERPL CALCULATED.3IONS-SCNC: 6 MMOL/L (ref 3–14)
APPEARANCE UR: ABNORMAL
APPEARANCE UR: ABNORMAL
AST SERPL W P-5'-P-CCNC: 14 U/L (ref 0–45)
AST SERPL W P-5'-P-CCNC: 18 U/L (ref 0–45)
AST SERPL W P-5'-P-CCNC: 44 U/L (ref 0–45)
AST SERPL W P-5'-P-CCNC: 6 U/L (ref 0–45)
AST SERPL W P-5'-P-CCNC: 94 U/L (ref 0–45)
BACTERIA #/AREA URNS HPF: ABNORMAL /HPF
BACTERIA SPEC CULT: ABNORMAL
BASE EXCESS BLDA CALC-SCNC: -0.2 MMOL/L (ref -9–1.8)
BASE EXCESS BLDA CALC-SCNC: -0.3 MMOL/L (ref -9–1.8)
BASE EXCESS BLDA CALC-SCNC: -0.6 MMOL/L (ref -9–1.8)
BASE EXCESS BLDA CALC-SCNC: -0.7 MMOL/L (ref -9–1.8)
BASE EXCESS BLDA CALC-SCNC: -0.9 MMOL/L (ref -9–1.8)
BASE EXCESS BLDA CALC-SCNC: -1.1 MMOL/L (ref -9–1.8)
BASE EXCESS BLDA CALC-SCNC: -1.9 MMOL/L (ref -9–1.8)
BASE EXCESS BLDA CALC-SCNC: -3.1 MMOL/L (ref -9–1.8)
BASE EXCESS BLDA CALC-SCNC: -3.1 MMOL/L (ref -9–1.8)
BASE EXCESS BLDA CALC-SCNC: -3.7 MMOL/L (ref -9–1.8)
BASE EXCESS BLDA CALC-SCNC: -4.6 MMOL/L (ref -9–1.8)
BASE EXCESS BLDA CALC-SCNC: -4.9 MMOL/L (ref -9–1.8)
BASE EXCESS BLDA CALC-SCNC: -8.6 MMOL/L (ref -9–1.8)
BASE EXCESS BLDA CALC-SCNC: 0 MMOL/L (ref -9–1.8)
BASE EXCESS BLDA CALC-SCNC: 0.3 MMOL/L (ref -9–1.8)
BASE EXCESS BLDA CALC-SCNC: 0.4 MMOL/L (ref -9–1.8)
BASE EXCESS BLDA CALC-SCNC: 0.6 MMOL/L (ref -9–1.8)
BASE EXCESS BLDV CALC-SCNC: -0.2 MMOL/L (ref -7.7–1.9)
BASE EXCESS BLDV CALC-SCNC: -0.3 MMOL/L (ref -7.7–1.9)
BASE EXCESS BLDV CALC-SCNC: -3.4 MMOL/L (ref -7.7–1.9)
BASE EXCESS BLDV CALC-SCNC: -7.2 MMOL/L (ref -7.7–1.9)
BASE EXCESS BLDV CALC-SCNC: 0.2 MMOL/L (ref -7.7–1.9)
BASE EXCESS BLDV CALC-SCNC: 0.2 MMOL/L (ref -7.7–1.9)
BASE EXCESS BLDV CALC-SCNC: 0.4 MMOL/L (ref -7.7–1.9)
BASE EXCESS BLDV CALC-SCNC: 0.7 MMOL/L (ref -7.7–1.9)
BASE EXCESS BLDV CALC-SCNC: 0.8 MMOL/L (ref -7.7–1.9)
BASE EXCESS BLDV CALC-SCNC: 0.9 MMOL/L (ref -7.7–1.9)
BASOPHILS # BLD AUTO: 0 10E3/UL (ref 0–0.2)
BASOPHILS NFR BLD AUTO: 0 %
BILIRUB SERPL-MCNC: 0.3 MG/DL (ref 0.2–1.3)
BILIRUB SERPL-MCNC: 0.4 MG/DL (ref 0.2–1.3)
BILIRUB SERPL-MCNC: 0.4 MG/DL (ref 0.2–1.3)
BILIRUB SERPL-MCNC: 0.5 MG/DL (ref 0.2–1.3)
BILIRUB SERPL-MCNC: 1.5 MG/DL (ref 0.2–1.3)
BILIRUB UR QL STRIP: NEGATIVE
BILIRUB UR QL STRIP: NEGATIVE
BUN SERPL-MCNC: 15 MG/DL (ref 7–30)
BUN SERPL-MCNC: 19 MG/DL (ref 7–30)
BUN SERPL-MCNC: 25 MG/DL (ref 7–30)
BUN SERPL-MCNC: 36 MG/DL (ref 7–30)
BUN SERPL-MCNC: 38 MG/DL (ref 7–30)
BUN SERPL-MCNC: 48 MG/DL (ref 7–30)
BUN SERPL-MCNC: 53 MG/DL (ref 7–30)
BUN SERPL-MCNC: 62 MG/DL (ref 7–30)
CALCIUM SERPL-MCNC: 6.7 MG/DL (ref 8.5–10.1)
CALCIUM SERPL-MCNC: 7.8 MG/DL (ref 8.5–10.1)
CALCIUM SERPL-MCNC: 8 MG/DL (ref 8.5–10.1)
CALCIUM SERPL-MCNC: 8.7 MG/DL (ref 8.5–10.1)
CALCIUM SERPL-MCNC: 9 MG/DL (ref 8.5–10.1)
CHLORIDE BLD-SCNC: 109 MMOL/L (ref 94–109)
CHLORIDE BLD-SCNC: 114 MMOL/L (ref 94–109)
CHLORIDE BLD-SCNC: 116 MMOL/L (ref 94–109)
CHLORIDE BLD-SCNC: 117 MMOL/L (ref 94–109)
CHLORIDE BLD-SCNC: 117 MMOL/L (ref 94–109)
CHLORIDE BLD-SCNC: 122 MMOL/L (ref 94–109)
CHLORIDE SERPL-SCNC: 106 MMOL/L (ref 94–109)
CHLORIDE SERPL-SCNC: 109 MMOL/L (ref 94–109)
CHOLEST SERPL-MCNC: 206 MG/DL
CITROBACTER SPECIES: NOT DETECTED
CO2 SERPL-SCNC: 24 MMOL/L (ref 20–32)
CO2 SERPL-SCNC: 25 MMOL/L (ref 20–32)
CO2 SERPL-SCNC: 27 MMOL/L (ref 20–32)
CO2 SERPL-SCNC: 27 MMOL/L (ref 20–32)
CO2 SERPL-SCNC: 28 MMOL/L (ref 20–32)
COLOR UR AUTO: YELLOW
COLOR UR AUTO: YELLOW
CREAT SERPL-MCNC: 0.61 MG/DL (ref 0.66–1.25)
CREAT SERPL-MCNC: 0.72 MG/DL (ref 0.66–1.25)
CREAT SERPL-MCNC: 0.8 MG/DL (ref 0.66–1.25)
CREAT SERPL-MCNC: 0.85 MG/DL (ref 0.66–1.25)
CREAT SERPL-MCNC: 0.86 MG/DL (ref 0.66–1.25)
CREAT SERPL-MCNC: 0.9 MG/DL (ref 0.66–1.25)
CREAT SERPL-MCNC: 0.91 MG/DL (ref 0.66–1.25)
CREAT SERPL-MCNC: 2.69 MG/DL (ref 0.66–1.25)
CREAT UR-MCNC: 134 MG/DL
CRP SERPL-MCNC: 72.2 MG/L (ref 0–8)
CRP SERPL-MCNC: 85.6 MG/L (ref 0–8)
CTX-M: NOT DETECTED
D DIMER PPP FEU-MCNC: 0.81 UG/ML FEU (ref 0–0.5)
D DIMER PPP FEU-MCNC: 1.54 UG/ML FEU (ref 0–0.5)
ENTEROBACTER SPECIES: NOT DETECTED
EOSINOPHIL # BLD AUTO: 0 10E3/UL (ref 0–0.7)
EOSINOPHIL NFR BLD AUTO: 0 %
ERYTHROCYTE [DISTWIDTH] IN BLOOD BY AUTOMATED COUNT: 14.9 % (ref 10–15)
ERYTHROCYTE [DISTWIDTH] IN BLOOD BY AUTOMATED COUNT: 15.3 % (ref 10–15)
ERYTHROCYTE [DISTWIDTH] IN BLOOD BY AUTOMATED COUNT: 15.3 % (ref 10–15)
ERYTHROCYTE [DISTWIDTH] IN BLOOD BY AUTOMATED COUNT: 15.8 % (ref 10–15)
ERYTHROCYTE [DISTWIDTH] IN BLOOD BY AUTOMATED COUNT: 16.4 % (ref 10–15)
ESCHERICHIA COLI: NOT DETECTED
FLUAV RNA SPEC QL NAA+PROBE: NEGATIVE
FLUBV RNA RESP QL NAA+PROBE: NEGATIVE
GFR SERPL CREATININE-BSD FRML MDRD: 23 ML/MIN/1.73M2
GFR SERPL CREATININE-BSD FRML MDRD: 85 ML/MIN/1.73M2
GFR SERPL CREATININE-BSD FRML MDRD: 86 ML/MIN/1.73M2
GFR SERPL CREATININE-BSD FRML MDRD: 88 ML/MIN/1.73M2
GFR SERPL CREATININE-BSD FRML MDRD: 88 ML/MIN/{1.73_M2}
GFR SERPL CREATININE-BSD FRML MDRD: >90 ML/MIN/1.73M2
GFR SERPL CREATININE-BSD FRML MDRD: >90 ML/MIN/1.73M2
GFR SERPL CREATININE-BSD FRML MDRD: >90 ML/MIN/{1.73_M2}
GLUCOSE BLD-MCNC: 102 MG/DL (ref 70–99)
GLUCOSE BLD-MCNC: 114 MG/DL (ref 70–99)
GLUCOSE BLD-MCNC: 215 MG/DL (ref 70–99)
GLUCOSE BLD-MCNC: 260 MG/DL (ref 70–99)
GLUCOSE BLD-MCNC: 320 MG/DL (ref 70–99)
GLUCOSE BLD-MCNC: 399 MG/DL (ref 70–99)
GLUCOSE BLDC GLUCOMTR-MCNC: 101 MG/DL (ref 70–99)
GLUCOSE BLDC GLUCOMTR-MCNC: 101 MG/DL (ref 70–99)
GLUCOSE BLDC GLUCOMTR-MCNC: 102 MG/DL (ref 70–99)
GLUCOSE BLDC GLUCOMTR-MCNC: 108 MG/DL (ref 70–99)
GLUCOSE BLDC GLUCOMTR-MCNC: 109 MG/DL (ref 70–99)
GLUCOSE BLDC GLUCOMTR-MCNC: 111 MG/DL (ref 70–99)
GLUCOSE BLDC GLUCOMTR-MCNC: 112 MG/DL (ref 70–99)
GLUCOSE BLDC GLUCOMTR-MCNC: 113 MG/DL (ref 70–99)
GLUCOSE BLDC GLUCOMTR-MCNC: 115 MG/DL (ref 70–99)
GLUCOSE BLDC GLUCOMTR-MCNC: 117 MG/DL (ref 70–99)
GLUCOSE BLDC GLUCOMTR-MCNC: 130 MG/DL (ref 70–99)
GLUCOSE BLDC GLUCOMTR-MCNC: 130 MG/DL (ref 70–99)
GLUCOSE BLDC GLUCOMTR-MCNC: 134 MG/DL (ref 70–99)
GLUCOSE BLDC GLUCOMTR-MCNC: 137 MG/DL (ref 70–99)
GLUCOSE BLDC GLUCOMTR-MCNC: 143 MG/DL (ref 70–99)
GLUCOSE BLDC GLUCOMTR-MCNC: 166 MG/DL (ref 70–99)
GLUCOSE BLDC GLUCOMTR-MCNC: 169 MG/DL (ref 70–99)
GLUCOSE BLDC GLUCOMTR-MCNC: 178 MG/DL (ref 70–99)
GLUCOSE BLDC GLUCOMTR-MCNC: 198 MG/DL (ref 70–99)
GLUCOSE BLDC GLUCOMTR-MCNC: 212 MG/DL (ref 70–99)
GLUCOSE BLDC GLUCOMTR-MCNC: 229 MG/DL (ref 70–99)
GLUCOSE BLDC GLUCOMTR-MCNC: 240 MG/DL (ref 70–99)
GLUCOSE BLDC GLUCOMTR-MCNC: 241 MG/DL (ref 70–99)
GLUCOSE BLDC GLUCOMTR-MCNC: 273 MG/DL (ref 70–99)
GLUCOSE BLDC GLUCOMTR-MCNC: 284 MG/DL (ref 70–99)
GLUCOSE BLDC GLUCOMTR-MCNC: 290 MG/DL (ref 70–99)
GLUCOSE BLDC GLUCOMTR-MCNC: 342 MG/DL (ref 70–99)
GLUCOSE BLDC GLUCOMTR-MCNC: 343 MG/DL (ref 70–99)
GLUCOSE BLDC GLUCOMTR-MCNC: 72 MG/DL (ref 70–99)
GLUCOSE BLDC GLUCOMTR-MCNC: 85 MG/DL (ref 70–99)
GLUCOSE BLDC GLUCOMTR-MCNC: 88 MG/DL (ref 70–99)
GLUCOSE BLDC GLUCOMTR-MCNC: 94 MG/DL (ref 70–99)
GLUCOSE BLDC GLUCOMTR-MCNC: 97 MG/DL (ref 70–99)
GLUCOSE SERPL-MCNC: 136 MG/DL (ref 70–99)
GLUCOSE SERPL-MCNC: 180 MG/DL (ref 70–99)
GLUCOSE UR STRIP-MCNC: >499 MG/DL
GLUCOSE UR STRIP-MCNC: NEGATIVE MG/DL
HBA1C MFR BLD: 7.1 % (ref 0–5.6)
HBA1C MFR BLD: 7.4 % (ref 0–5.6)
HBA1C MFR BLD: 7.6 % (ref 0–5.6)
HCO3 BLD-SCNC: 23 MMOL/L (ref 21–28)
HCO3 BLD-SCNC: 23 MMOL/L (ref 21–28)
HCO3 BLD-SCNC: 24 MMOL/L (ref 21–28)
HCO3 BLD-SCNC: 25 MMOL/L (ref 21–28)
HCO3 BLD-SCNC: 25 MMOL/L (ref 21–28)
HCO3 BLD-SCNC: 26 MMOL/L (ref 21–28)
HCO3 BLD-SCNC: 27 MMOL/L (ref 21–28)
HCO3 BLDV-SCNC: 17 MMOL/L (ref 21–28)
HCO3 BLDV-SCNC: 24 MMOL/L (ref 21–28)
HCO3 BLDV-SCNC: 24 MMOL/L (ref 21–28)
HCO3 BLDV-SCNC: 25 MMOL/L (ref 21–28)
HCO3 BLDV-SCNC: 26 MMOL/L (ref 21–28)
HCO3 BLDV-SCNC: 26 MMOL/L (ref 21–28)
HCT VFR BLD AUTO: 41.3 % (ref 40–53)
HCT VFR BLD AUTO: 41.9 % (ref 40–53)
HCT VFR BLD AUTO: 42.4 % (ref 40–53)
HCT VFR BLD AUTO: 44 % (ref 40–53)
HCT VFR BLD AUTO: 45.4 % (ref 40–53)
HDLC SERPL-MCNC: 38 MG/DL
HGB BLD-MCNC: 13 G/DL (ref 13.3–17.7)
HGB BLD-MCNC: 13.5 G/DL (ref 13.3–17.7)
HGB BLD-MCNC: 14.1 G/DL (ref 13.3–17.7)
HGB UR QL STRIP: ABNORMAL
HGB UR QL STRIP: ABNORMAL
HOLD SPECIMEN: NORMAL
HYALINE CASTS: 1 /LPF
IMM GRANULOCYTES # BLD: 0 10E3/UL
IMM GRANULOCYTES NFR BLD: 0 %
IMP: NOT DETECTED
INR PPP: 0.98 (ref 0.85–1.15)
KETONES UR STRIP-MCNC: NEGATIVE MG/DL
KETONES UR STRIP-MCNC: NEGATIVE MG/DL
KLEBSIELLA OXYTOCA: NOT DETECTED
KLEBSIELLA PNEUMONIAE: NOT DETECTED
KPC: NOT DETECTED
LACTATE SERPL-SCNC: 1.2 MMOL/L (ref 0.7–2)
LACTATE SERPL-SCNC: 1.9 MMOL/L (ref 0.7–2)
LACTATE SERPL-SCNC: 8.7 MMOL/L (ref 0.7–2)
LDLC SERPL CALC-MCNC: 111 MG/DL
LEUKOCYTE ESTERASE UR QL STRIP: NEGATIVE
LEUKOCYTE ESTERASE UR QL STRIP: NEGATIVE
LYMPHOCYTES # BLD AUTO: 0.7 10E3/UL (ref 0.8–5.3)
LYMPHOCYTES NFR BLD AUTO: 13 %
MAGNESIUM SERPL-MCNC: 3.2 MG/DL (ref 1.6–2.3)
MAGNESIUM SERPL-MCNC: 3.2 MG/DL (ref 1.6–2.3)
MCH RBC QN AUTO: 29.3 PG (ref 26.5–33)
MCH RBC QN AUTO: 29.5 PG (ref 26.5–33)
MCH RBC QN AUTO: 29.5 PG (ref 26.5–33)
MCH RBC QN AUTO: 29.6 PG (ref 26.5–33)
MCH RBC QN AUTO: 30.2 PG (ref 26.5–33)
MCHC RBC AUTO-ENTMCNC: 29.7 G/DL (ref 31.5–36.5)
MCHC RBC AUTO-ENTMCNC: 30.7 G/DL (ref 31.5–36.5)
MCHC RBC AUTO-ENTMCNC: 31.5 G/DL (ref 31.5–36.5)
MCHC RBC AUTO-ENTMCNC: 31.8 G/DL (ref 31.5–36.5)
MCHC RBC AUTO-ENTMCNC: 33.7 G/DL (ref 31.5–36.5)
MCV RBC AUTO: 90 FL (ref 78–100)
MCV RBC AUTO: 93 FL (ref 78–100)
MCV RBC AUTO: 94 FL (ref 78–100)
MCV RBC AUTO: 95 FL (ref 78–100)
MCV RBC AUTO: 99 FL (ref 78–100)
MICROALBUMIN UR-MCNC: 49 MG/L
MICROALBUMIN/CREAT UR: 36.34 MG/G CR (ref 0–17)
MONOCYTES # BLD AUTO: 0.2 10E3/UL (ref 0–1.3)
MONOCYTES NFR BLD AUTO: 5 %
MRSA DNA SPEC QL NAA+PROBE: NEGATIVE
MRSA DNA SPEC QL NAA+PROBE: NEGATIVE
MRSA DNA SPEC QL NAA+PROBE: POSITIVE
MUCOUS THREADS #/AREA URNS LPF: PRESENT /LPF
MUCOUS THREADS #/AREA URNS LPF: PRESENT /LPF
NDM: NOT DETECTED
NEUTROPHILS # BLD AUTO: 4.4 10E3/UL (ref 1.6–8.3)
NEUTROPHILS NFR BLD AUTO: 82 %
NITRATE UR QL: NEGATIVE
NITRATE UR QL: NEGATIVE
NONHDLC SERPL-MCNC: 168 MG/DL
NRBC # BLD AUTO: 0 10E3/UL
NRBC BLD AUTO-RTO: 0 /100
O2/TOTAL GAS SETTING VFR VENT: 100 %
O2/TOTAL GAS SETTING VFR VENT: 21 %
O2/TOTAL GAS SETTING VFR VENT: 55 %
O2/TOTAL GAS SETTING VFR VENT: 60 %
O2/TOTAL GAS SETTING VFR VENT: 60 %
O2/TOTAL GAS SETTING VFR VENT: 70 %
O2/TOTAL GAS SETTING VFR VENT: 75 %
O2/TOTAL GAS SETTING VFR VENT: 80 %
O2/TOTAL GAS SETTING VFR VENT: 85 %
OXA (DETECTED/NOT DETECTED): NOT DETECTED
OXYHGB MFR BLD: 93 % (ref 92–100)
OXYHGB MFR BLD: 96 % (ref 92–100)
OXYHGB MFR BLD: 98 % (ref 92–100)
PCO2 BLD: 33 MM HG (ref 35–45)
PCO2 BLD: 34 MM HG (ref 35–45)
PCO2 BLD: 35 MM HG (ref 35–45)
PCO2 BLD: 36 MM HG (ref 35–45)
PCO2 BLD: 46 MM HG (ref 35–45)
PCO2 BLD: 48 MM HG (ref 35–45)
PCO2 BLD: 50 MM HG (ref 35–45)
PCO2 BLD: 50 MM HG (ref 35–45)
PCO2 BLD: 55 MM HG (ref 35–45)
PCO2 BLD: 57 MM HG (ref 35–45)
PCO2 BLD: 57 MM HG (ref 35–45)
PCO2 BLD: 59 MM HG (ref 35–45)
PCO2 BLD: 65 MM HG (ref 35–45)
PCO2 BLD: 66 MM HG (ref 35–45)
PCO2 BLD: 67 MM HG (ref 35–45)
PCO2 BLD: 77 MM HG (ref 35–45)
PCO2 BLD: 77 MM HG (ref 35–45)
PCO2 BLDV: 32 MM HG (ref 40–50)
PCO2 BLDV: 34 MM HG (ref 40–50)
PCO2 BLDV: 35 MM HG (ref 40–50)
PCO2 BLDV: 37 MM HG (ref 40–50)
PCO2 BLDV: 38 MM HG (ref 40–50)
PCO2 BLDV: 38 MM HG (ref 40–50)
PCO2 BLDV: 40 MM HG (ref 40–50)
PCO2 BLDV: 40 MM HG (ref 40–50)
PCO2 BLDV: 41 MM HG (ref 40–50)
PCO2 BLDV: 63 MM HG (ref 40–50)
PH BLD: 7.08 [PH] (ref 7.35–7.45)
PH BLD: 7.14 [PH] (ref 7.35–7.45)
PH BLD: 7.18 [PH] (ref 7.35–7.45)
PH BLD: 7.21 [PH] (ref 7.35–7.45)
PH BLD: 7.22 [PH] (ref 7.35–7.45)
PH BLD: 7.26 [PH] (ref 7.35–7.45)
PH BLD: 7.27 [PH] (ref 7.35–7.45)
PH BLD: 7.29 [PH] (ref 7.35–7.45)
PH BLD: 7.29 [PH] (ref 7.35–7.45)
PH BLD: 7.3 [PH] (ref 7.35–7.45)
PH BLD: 7.32 [PH] (ref 7.35–7.45)
PH BLD: 7.35 [PH] (ref 7.35–7.45)
PH BLD: 7.37 [PH] (ref 7.35–7.45)
PH BLD: 7.43 [PH] (ref 7.35–7.45)
PH BLD: 7.45 [PH] (ref 7.35–7.45)
PH BLDV: 7.22 [PH] (ref 7.32–7.43)
PH BLDV: 7.35 [PH] (ref 7.32–7.43)
PH BLDV: 7.4 [PH] (ref 7.32–7.43)
PH BLDV: 7.41 [PH] (ref 7.32–7.43)
PH BLDV: 7.41 [PH] (ref 7.32–7.43)
PH BLDV: 7.42 [PH] (ref 7.32–7.43)
PH BLDV: 7.43 [PH] (ref 7.32–7.43)
PH BLDV: 7.44 [PH] (ref 7.32–7.43)
PH BLDV: 7.44 [PH] (ref 7.32–7.43)
PH BLDV: 7.45 [PH] (ref 7.32–7.43)
PH UR STRIP: 6 [PH] (ref 5–7)
PH UR STRIP: 6 [PH] (ref 5–7)
PHOSPHATE SERPL-MCNC: 2.4 MG/DL (ref 2.5–4.5)
PHOSPHATE SERPL-MCNC: 2.6 MG/DL (ref 2.5–4.5)
PHOSPHATE SERPL-MCNC: 3.2 MG/DL (ref 2.5–4.5)
PLATELET # BLD AUTO: 167 10E3/UL (ref 150–450)
PLATELET # BLD AUTO: 197 10E3/UL (ref 150–450)
PLATELET # BLD AUTO: 337 10E3/UL (ref 150–450)
PLATELET # BLD AUTO: 384 10E3/UL (ref 150–450)
PLATELET # BLD AUTO: 389 10E3/UL (ref 150–450)
PO2 BLD: 105 MM HG (ref 80–105)
PO2 BLD: 106 MM HG (ref 80–105)
PO2 BLD: 114 MM HG (ref 80–105)
PO2 BLD: 148 MM HG (ref 80–105)
PO2 BLD: 54 MM HG (ref 80–105)
PO2 BLD: 55 MM HG (ref 80–105)
PO2 BLD: 65 MM HG (ref 80–105)
PO2 BLD: 67 MM HG (ref 80–105)
PO2 BLD: 69 MM HG (ref 80–105)
PO2 BLD: 70 MM HG (ref 80–105)
PO2 BLD: 71 MM HG (ref 80–105)
PO2 BLD: 75 MM HG (ref 80–105)
PO2 BLD: 79 MM HG (ref 80–105)
PO2 BLD: 82 MM HG (ref 80–105)
PO2 BLD: 83 MM HG (ref 80–105)
PO2 BLD: 89 MM HG (ref 80–105)
PO2 BLD: 93 MM HG (ref 80–105)
PO2 BLDV: 131 MM HG (ref 25–47)
PO2 BLDV: 31 MM HG (ref 25–47)
PO2 BLDV: 36 MM HG (ref 25–47)
PO2 BLDV: 40 MM HG (ref 25–47)
PO2 BLDV: 45 MM HG (ref 25–47)
PO2 BLDV: 47 MM HG (ref 25–47)
PO2 BLDV: 49 MM HG (ref 25–47)
PO2 BLDV: 55 MM HG (ref 25–47)
PO2 BLDV: 55 MM HG (ref 25–47)
PO2 BLDV: 59 MM HG (ref 25–47)
POTASSIUM BLD-SCNC: 3.8 MMOL/L (ref 3.4–5.3)
POTASSIUM BLD-SCNC: 4 MMOL/L (ref 3.4–5.3)
POTASSIUM BLD-SCNC: 4.2 MMOL/L (ref 3.4–5.3)
POTASSIUM BLD-SCNC: 4.3 MMOL/L (ref 3.4–5.3)
POTASSIUM BLD-SCNC: 4.7 MMOL/L (ref 3.4–5.3)
POTASSIUM BLD-SCNC: 5.2 MMOL/L (ref 3.4–5.3)
POTASSIUM BLD-SCNC: 5.6 MMOL/L (ref 3.4–5.3)
POTASSIUM SERPL-SCNC: 3.9 MMOL/L (ref 3.4–5.3)
POTASSIUM SERPL-SCNC: 4.1 MMOL/L (ref 3.4–5.3)
PROCALCITONIN SERPL-MCNC: 0.08 NG/ML
PROT SERPL-MCNC: 4.5 G/DL (ref 6.8–8.8)
PROT SERPL-MCNC: 6 G/DL (ref 6.8–8.8)
PROT SERPL-MCNC: 6.2 G/DL (ref 6.8–8.8)
PROT SERPL-MCNC: 6.8 G/DL (ref 6.8–8.8)
PROT SERPL-MCNC: 7.8 G/DL (ref 6.8–8.8)
PROTEUS SPECIES: NOT DETECTED
PSEUDOMONAS AERUGINOSA: DETECTED
RBC # BLD AUTO: 4.41 10E6/UL (ref 4.4–5.9)
RBC # BLD AUTO: 4.56 10E6/UL (ref 4.4–5.9)
RBC # BLD AUTO: 4.58 10E6/UL (ref 4.4–5.9)
RBC # BLD AUTO: 4.61 10E6/UL (ref 4.4–5.9)
RBC # BLD AUTO: 4.67 10E6/UL (ref 4.4–5.9)
RBC URINE: 11 /HPF
RBC URINE: <1 /HPF
RETINOPATHY: NEGATIVE
SA TARGET DNA: NEGATIVE
SARS-COV-2 RNA RESP QL NAA+PROBE: POSITIVE
SODIUM SERPL-SCNC: 138 MMOL/L (ref 133–144)
SODIUM SERPL-SCNC: 138 MMOL/L (ref 133–144)
SODIUM SERPL-SCNC: 139 MMOL/L (ref 133–144)
SODIUM SERPL-SCNC: 144 MMOL/L (ref 133–144)
SODIUM SERPL-SCNC: 144 MMOL/L (ref 133–144)
SODIUM SERPL-SCNC: 147 MMOL/L (ref 133–144)
SODIUM SERPL-SCNC: 151 MMOL/L (ref 133–144)
SODIUM SERPL-SCNC: 151 MMOL/L (ref 133–144)
SP GR UR STRIP: 1.03 (ref 1–1.03)
SP GR UR STRIP: 1.03 (ref 1–1.03)
SPECIMEN SOURCE: ABNORMAL
SPECIMEN SOURCE: ABNORMAL
SPECIMEN SOURCE: NORMAL
SQUAMOUS EPITHELIAL: <1 /HPF
TRIGL SERPL-MCNC: 285 MG/DL
TRIGL SERPL-MCNC: 59 MG/DL
TROPONIN I SERPL HS-MCNC: 19 NG/L
UROBILINOGEN UR STRIP-MCNC: NORMAL MG/DL
UROBILINOGEN UR STRIP-MCNC: NORMAL MG/DL
VIM: NOT DETECTED
WBC # BLD AUTO: 15.5 10E3/UL (ref 4–11)
WBC # BLD AUTO: 19.3 10E3/UL (ref 4–11)
WBC # BLD AUTO: 23.2 10E3/UL (ref 4–11)
WBC # BLD AUTO: 3.9 10E3/UL (ref 4–11)
WBC # BLD AUTO: 5.3 10E3/UL (ref 4–11)
WBC URINE: 2 /HPF
WBC URINE: 4 /HPF

## 2021-01-01 PROCEDURE — 94640 AIRWAY INHALATION TREATMENT: CPT

## 2021-01-01 PROCEDURE — 250N000009 HC RX 250: Performed by: INTERNAL MEDICINE

## 2021-01-01 PROCEDURE — 250N000009 HC RX 250: Performed by: EMERGENCY MEDICINE

## 2021-01-01 PROCEDURE — 94660 CPAP INITIATION&MGMT: CPT

## 2021-01-01 PROCEDURE — 999N000158 HC STATISTIC RCP TIME ED VENT EA 10 MIN

## 2021-01-01 PROCEDURE — 87186 SC STD MICRODIL/AGAR DIL: CPT | Performed by: FAMILY MEDICINE

## 2021-01-01 PROCEDURE — 93005 ELECTROCARDIOGRAM TRACING: CPT | Mod: 76 | Performed by: EMERGENCY MEDICINE

## 2021-01-01 PROCEDURE — 36592 COLLECT BLOOD FROM PICC: CPT | Performed by: INTERNAL MEDICINE

## 2021-01-01 PROCEDURE — 999N000065 XR CHEST PORT 1 VIEW

## 2021-01-01 PROCEDURE — 36415 COLL VENOUS BLD VENIPUNCTURE: CPT | Performed by: FAMILY MEDICINE

## 2021-01-01 PROCEDURE — 258N000003 HC RX IP 258 OP 636: Performed by: INTERNAL MEDICINE

## 2021-01-01 PROCEDURE — 250N000011 HC RX IP 250 OP 636: Performed by: INTERNAL MEDICINE

## 2021-01-01 PROCEDURE — 99283 EMERGENCY DEPT VISIT LOW MDM: CPT | Performed by: FAMILY MEDICINE

## 2021-01-01 PROCEDURE — 999N000123 HC STATISTIC OXYGEN O2DAILY TECH TIME

## 2021-01-01 PROCEDURE — 250N000011 HC RX IP 250 OP 636: Performed by: EMERGENCY MEDICINE

## 2021-01-01 PROCEDURE — 36415 COLL VENOUS BLD VENIPUNCTURE: CPT | Performed by: INTERNAL MEDICINE

## 2021-01-01 PROCEDURE — 258N000003 HC RX IP 258 OP 636: Performed by: EMERGENCY MEDICINE

## 2021-01-01 PROCEDURE — XW033H4 INTRODUCTION OF SYNTHETIC HUMAN ANGIOTENSIN II INTO PERIPHERAL VEIN, PERCUTANEOUS APPROACH, NEW TECHNOLOGY GROUP 4: ICD-10-PCS | Performed by: INTERNAL MEDICINE

## 2021-01-01 PROCEDURE — 87149 DNA/RNA DIRECT PROBE: CPT | Performed by: INTERNAL MEDICINE

## 2021-01-01 PROCEDURE — 81001 URINALYSIS AUTO W/SCOPE: CPT | Performed by: FAMILY MEDICINE

## 2021-01-01 PROCEDURE — 82803 BLOOD GASES ANY COMBINATION: CPT | Performed by: INTERNAL MEDICINE

## 2021-01-01 PROCEDURE — 94003 VENT MGMT INPAT SUBQ DAY: CPT

## 2021-01-01 PROCEDURE — 200N000001 HC R&B ICU

## 2021-01-01 PROCEDURE — 94640 AIRWAY INHALATION TREATMENT: CPT | Performed by: EMERGENCY MEDICINE

## 2021-01-01 PROCEDURE — 250N000009 HC RX 250: Performed by: SURGERY

## 2021-01-01 PROCEDURE — 83036 HEMOGLOBIN GLYCOSYLATED A1C: CPT | Performed by: SURGERY

## 2021-01-01 PROCEDURE — 250N000013 HC RX MED GY IP 250 OP 250 PS 637: Performed by: INTERNAL MEDICINE

## 2021-01-01 PROCEDURE — 87641 MR-STAPH DNA AMP PROBE: CPT | Performed by: INTERNAL MEDICINE

## 2021-01-01 PROCEDURE — 96372 THER/PROPH/DIAG INJ SC/IM: CPT | Performed by: FAMILY MEDICINE

## 2021-01-01 PROCEDURE — 96376 TX/PRO/DX INJ SAME DRUG ADON: CPT | Performed by: EMERGENCY MEDICINE

## 2021-01-01 PROCEDURE — 83605 ASSAY OF LACTIC ACID: CPT | Performed by: EMERGENCY MEDICINE

## 2021-01-01 PROCEDURE — 84484 ASSAY OF TROPONIN QUANT: CPT | Performed by: EMERGENCY MEDICINE

## 2021-01-01 PROCEDURE — 99213 OFFICE O/P EST LOW 20 MIN: CPT | Performed by: PHYSICIAN ASSISTANT

## 2021-01-01 PROCEDURE — 87636 SARSCOV2 & INF A&B AMP PRB: CPT | Performed by: EMERGENCY MEDICINE

## 2021-01-01 PROCEDURE — 99291 CRITICAL CARE FIRST HOUR: CPT | Mod: 25 | Performed by: INTERNAL MEDICINE

## 2021-01-01 PROCEDURE — 999N000009 HC STATISTIC AIRWAY CARE

## 2021-01-01 PROCEDURE — 36620 INSERTION CATHETER ARTERY: CPT | Performed by: INTERNAL MEDICINE

## 2021-01-01 PROCEDURE — 999N000156 HC STATISTIC RCP CONSULT EA 30 MIN

## 2021-01-01 PROCEDURE — 250N000011 HC RX IP 250 OP 636: Performed by: FAMILY MEDICINE

## 2021-01-01 PROCEDURE — 999N000105 HC STATISTIC NO DOCUMENTATION TO SUPPORT CHARGE

## 2021-01-01 PROCEDURE — 83735 ASSAY OF MAGNESIUM: CPT | Performed by: INTERNAL MEDICINE

## 2021-01-01 PROCEDURE — 82805 BLOOD GASES W/O2 SATURATION: CPT | Performed by: EMERGENCY MEDICINE

## 2021-01-01 PROCEDURE — 99239 HOSP IP/OBS DSCHRG MGMT >30: CPT | Performed by: INTERNAL MEDICINE

## 2021-01-01 PROCEDURE — 36415 COLL VENOUS BLD VENIPUNCTURE: CPT | Performed by: NURSE PRACTITIONER

## 2021-01-01 PROCEDURE — 999N000065 XR ABDOMEN PORT 1 VIEWS

## 2021-01-01 PROCEDURE — 82803 BLOOD GASES ANY COMBINATION: CPT | Performed by: EMERGENCY MEDICINE

## 2021-01-01 PROCEDURE — 999N000157 HC STATISTIC RCP TIME EA 10 MIN

## 2021-01-01 PROCEDURE — 36415 COLL VENOUS BLD VENIPUNCTURE: CPT | Performed by: EMERGENCY MEDICINE

## 2021-01-01 PROCEDURE — 84132 ASSAY OF SERUM POTASSIUM: CPT | Performed by: INTERNAL MEDICINE

## 2021-01-01 PROCEDURE — 36600 WITHDRAWAL OF ARTERIAL BLOOD: CPT | Mod: 59

## 2021-01-01 PROCEDURE — 3E043XZ INTRODUCTION OF VASOPRESSOR INTO CENTRAL VEIN, PERCUTANEOUS APPROACH: ICD-10-PCS | Performed by: INTERNAL MEDICINE

## 2021-01-01 PROCEDURE — 82043 UR ALBUMIN QUANTITATIVE: CPT | Performed by: NURSE PRACTITIONER

## 2021-01-01 PROCEDURE — 250N000013 HC RX MED GY IP 250 OP 250 PS 637: Performed by: EMERGENCY MEDICINE

## 2021-01-01 PROCEDURE — 99291 CRITICAL CARE FIRST HOUR: CPT | Mod: 25 | Performed by: EMERGENCY MEDICINE

## 2021-01-01 PROCEDURE — 96372 THER/PROPH/DIAG INJ SC/IM: CPT | Mod: XS | Performed by: EMERGENCY MEDICINE

## 2021-01-01 PROCEDURE — 96372 THER/PROPH/DIAG INJ SC/IM: CPT | Performed by: EMERGENCY MEDICINE

## 2021-01-01 PROCEDURE — 370N000003 HC ANESTHESIA WARD SERVICE

## 2021-01-01 PROCEDURE — G0296 VISIT TO DETERM LDCT ELIG: HCPCS | Performed by: INTERNAL MEDICINE

## 2021-01-01 PROCEDURE — 31500 INSERT EMERGENCY AIRWAY: CPT | Performed by: EMERGENCY MEDICINE

## 2021-01-01 PROCEDURE — 94002 VENT MGMT INPAT INIT DAY: CPT

## 2021-01-01 PROCEDURE — 85014 HEMATOCRIT: CPT | Performed by: INTERNAL MEDICINE

## 2021-01-01 PROCEDURE — 96375 TX/PRO/DX INJ NEW DRUG ADDON: CPT | Performed by: EMERGENCY MEDICINE

## 2021-01-01 PROCEDURE — 250N000011 HC RX IP 250 OP 636

## 2021-01-01 PROCEDURE — 82803 BLOOD GASES ANY COMBINATION: CPT | Mod: 91 | Performed by: FAMILY MEDICINE

## 2021-01-01 PROCEDURE — 36600 WITHDRAWAL OF ARTERIAL BLOOD: CPT

## 2021-01-01 PROCEDURE — 80048 BASIC METABOLIC PNL TOTAL CA: CPT | Performed by: INTERNAL MEDICINE

## 2021-01-01 PROCEDURE — 5A1945Z RESPIRATORY VENTILATION, 24-96 CONSECUTIVE HOURS: ICD-10-PCS | Performed by: INTERNAL MEDICINE

## 2021-01-01 PROCEDURE — 99292 CRITICAL CARE ADDL 30 MIN: CPT | Performed by: EMERGENCY MEDICINE

## 2021-01-01 PROCEDURE — 99213 OFFICE O/P EST LOW 20 MIN: CPT | Mod: 25 | Performed by: INTERNAL MEDICINE

## 2021-01-01 PROCEDURE — 94640 AIRWAY INHALATION TREATMENT: CPT | Mod: 76

## 2021-01-01 PROCEDURE — 85027 COMPLETE CBC AUTOMATED: CPT | Performed by: INTERNAL MEDICINE

## 2021-01-01 PROCEDURE — 250N000009 HC RX 250: Performed by: FAMILY MEDICINE

## 2021-01-01 PROCEDURE — 258N000003 HC RX IP 258 OP 636: Performed by: SURGERY

## 2021-01-01 PROCEDURE — 84478 ASSAY OF TRIGLYCERIDES: CPT | Performed by: INTERNAL MEDICINE

## 2021-01-01 PROCEDURE — 93010 ELECTROCARDIOGRAM REPORT: CPT | Performed by: EMERGENCY MEDICINE

## 2021-01-01 PROCEDURE — 96366 THER/PROPH/DIAG IV INF ADDON: CPT | Performed by: EMERGENCY MEDICINE

## 2021-01-01 PROCEDURE — 99207 PR NO CHARGE NURSE ONLY: CPT

## 2021-01-01 PROCEDURE — 83605 ASSAY OF LACTIC ACID: CPT | Performed by: INTERNAL MEDICINE

## 2021-01-01 PROCEDURE — 80053 COMPREHEN METABOLIC PANEL: CPT | Performed by: NURSE PRACTITIONER

## 2021-01-01 PROCEDURE — 250N000009 HC RX 250

## 2021-01-01 PROCEDURE — 99291 CRITICAL CARE FIRST HOUR: CPT | Performed by: INTERNAL MEDICINE

## 2021-01-01 PROCEDURE — 84145 PROCALCITONIN (PCT): CPT | Performed by: INTERNAL MEDICINE

## 2021-01-01 PROCEDURE — 80053 COMPREHEN METABOLIC PANEL: CPT | Performed by: INTERNAL MEDICINE

## 2021-01-01 PROCEDURE — 250N000012 HC RX MED GY IP 250 OP 636 PS 637: Performed by: SURGERY

## 2021-01-01 PROCEDURE — 82805 BLOOD GASES W/O2 SATURATION: CPT | Performed by: FAMILY MEDICINE

## 2021-01-01 PROCEDURE — 85379 FIBRIN DEGRADATION QUANT: CPT | Performed by: INTERNAL MEDICINE

## 2021-01-01 PROCEDURE — 87641 MR-STAPH DNA AMP PROBE: CPT | Performed by: FAMILY MEDICINE

## 2021-01-01 PROCEDURE — 80048 BASIC METABOLIC PNL TOTAL CA: CPT | Performed by: FAMILY MEDICINE

## 2021-01-01 PROCEDURE — 82040 ASSAY OF SERUM ALBUMIN: CPT | Performed by: EMERGENCY MEDICINE

## 2021-01-01 PROCEDURE — 82803 BLOOD GASES ANY COMBINATION: CPT | Performed by: STUDENT IN AN ORGANIZED HEALTH CARE EDUCATION/TRAINING PROGRAM

## 2021-01-01 PROCEDURE — 87641 MR-STAPH DNA AMP PROBE: CPT | Mod: GY | Performed by: INTERNAL MEDICINE

## 2021-01-01 PROCEDURE — 87640 STAPH A DNA AMP PROBE: CPT | Performed by: INTERNAL MEDICINE

## 2021-01-01 PROCEDURE — 83036 HEMOGLOBIN GLYCOSYLATED A1C: CPT | Performed by: NURSE PRACTITIONER

## 2021-01-01 PROCEDURE — 83605 ASSAY OF LACTIC ACID: CPT | Performed by: STUDENT IN AN ORGANIZED HEALTH CARE EDUCATION/TRAINING PROGRAM

## 2021-01-01 PROCEDURE — 82803 BLOOD GASES ANY COMBINATION: CPT | Performed by: SURGERY

## 2021-01-01 PROCEDURE — 99291 CRITICAL CARE FIRST HOUR: CPT | Performed by: STUDENT IN AN ORGANIZED HEALTH CARE EDUCATION/TRAINING PROGRAM

## 2021-01-01 PROCEDURE — 258N000003 HC RX IP 258 OP 636: Performed by: STUDENT IN AN ORGANIZED HEALTH CARE EDUCATION/TRAINING PROGRAM

## 2021-01-01 PROCEDURE — 96365 THER/PROPH/DIAG IV INF INIT: CPT | Mod: 59 | Performed by: EMERGENCY MEDICINE

## 2021-01-01 PROCEDURE — 99214 OFFICE O/P EST MOD 30 MIN: CPT | Performed by: NURSE PRACTITIONER

## 2021-01-01 PROCEDURE — 71275 CT ANGIOGRAPHY CHEST: CPT

## 2021-01-01 PROCEDURE — 82040 ASSAY OF SERUM ALBUMIN: CPT | Performed by: INTERNAL MEDICINE

## 2021-01-01 PROCEDURE — C9803 HOPD COVID-19 SPEC COLLECT: HCPCS | Performed by: EMERGENCY MEDICINE

## 2021-01-01 PROCEDURE — 85379 FIBRIN DEGRADATION QUANT: CPT | Performed by: EMERGENCY MEDICINE

## 2021-01-01 PROCEDURE — 96367 TX/PROPH/DG ADDL SEQ IV INF: CPT | Performed by: EMERGENCY MEDICINE

## 2021-01-01 PROCEDURE — 99213 OFFICE O/P EST LOW 20 MIN: CPT | Mod: 95 | Performed by: INTERNAL MEDICINE

## 2021-01-01 PROCEDURE — 99203 OFFICE O/P NEW LOW 30 MIN: CPT | Mod: 95 | Performed by: PHYSICIAN ASSISTANT

## 2021-01-01 PROCEDURE — 81001 URINALYSIS AUTO W/SCOPE: CPT | Performed by: INTERNAL MEDICINE

## 2021-01-01 PROCEDURE — 51702 INSERT TEMP BLADDER CATH: CPT | Performed by: EMERGENCY MEDICINE

## 2021-01-01 PROCEDURE — 86140 C-REACTIVE PROTEIN: CPT | Performed by: INTERNAL MEDICINE

## 2021-01-01 PROCEDURE — 82803 BLOOD GASES ANY COMBINATION: CPT | Performed by: FAMILY MEDICINE

## 2021-01-01 PROCEDURE — 99406 BEHAV CHNG SMOKING 3-10 MIN: CPT | Performed by: INTERNAL MEDICINE

## 2021-01-01 PROCEDURE — 99284 EMERGENCY DEPT VISIT MOD MDM: CPT | Performed by: FAMILY MEDICINE

## 2021-01-01 PROCEDURE — 87077 CULTURE AEROBIC IDENTIFY: CPT | Performed by: INTERNAL MEDICINE

## 2021-01-01 PROCEDURE — 96361 HYDRATE IV INFUSION ADD-ON: CPT | Performed by: EMERGENCY MEDICINE

## 2021-01-01 PROCEDURE — 96375 TX/PRO/DX INJ NEW DRUG ADDON: CPT | Mod: 59 | Performed by: EMERGENCY MEDICINE

## 2021-01-01 PROCEDURE — 85018 HEMOGLOBIN: CPT | Performed by: INTERNAL MEDICINE

## 2021-01-01 PROCEDURE — C9113 INJ PANTOPRAZOLE SODIUM, VIA: HCPCS | Performed by: INTERNAL MEDICINE

## 2021-01-01 PROCEDURE — 85610 PROTHROMBIN TIME: CPT | Performed by: EMERGENCY MEDICINE

## 2021-01-01 PROCEDURE — 258N000003 HC RX IP 258 OP 636: Performed by: FAMILY MEDICINE

## 2021-01-01 PROCEDURE — 85025 COMPLETE CBC W/AUTO DIFF WBC: CPT | Performed by: EMERGENCY MEDICINE

## 2021-01-01 PROCEDURE — 87640 STAPH A DNA AMP PROBE: CPT | Performed by: FAMILY MEDICINE

## 2021-01-01 PROCEDURE — 83036 HEMOGLOBIN GLYCOSYLATED A1C: CPT | Performed by: INTERNAL MEDICINE

## 2021-01-01 PROCEDURE — 250N000013 HC RX MED GY IP 250 OP 250 PS 637: Performed by: FAMILY MEDICINE

## 2021-01-01 PROCEDURE — 96372 THER/PROPH/DIAG INJ SC/IM: CPT | Mod: XS | Performed by: FAMILY MEDICINE

## 2021-01-01 PROCEDURE — 83735 ASSAY OF MAGNESIUM: CPT | Performed by: SURGERY

## 2021-01-01 PROCEDURE — 84100 ASSAY OF PHOSPHORUS: CPT | Performed by: SURGERY

## 2021-01-01 PROCEDURE — 96361 HYDRATE IV INFUSION ADD-ON: CPT | Mod: 59 | Performed by: EMERGENCY MEDICINE

## 2021-01-01 PROCEDURE — 87205 SMEAR GRAM STAIN: CPT | Performed by: INTERNAL MEDICINE

## 2021-01-01 PROCEDURE — 84100 ASSAY OF PHOSPHORUS: CPT | Performed by: INTERNAL MEDICINE

## 2021-01-01 PROCEDURE — 250N000011 HC RX IP 250 OP 636: Performed by: STUDENT IN AN ORGANIZED HEALTH CARE EDUCATION/TRAINING PROGRAM

## 2021-01-01 PROCEDURE — 86140 C-REACTIVE PROTEIN: CPT | Performed by: EMERGENCY MEDICINE

## 2021-01-01 PROCEDURE — 80061 LIPID PANEL: CPT | Performed by: NURSE PRACTITIONER

## 2021-01-01 PROCEDURE — G0438 PPPS, INITIAL VISIT: HCPCS | Performed by: INTERNAL MEDICINE

## 2021-01-01 RX ORDER — HALOPERIDOL 5 MG/ML
2 INJECTION INTRAMUSCULAR EVERY 6 HOURS PRN
Status: DISCONTINUED | OUTPATIENT
Start: 2021-01-01 | End: 2021-01-01 | Stop reason: HOSPADM

## 2021-01-01 RX ORDER — WATER 10 ML/10ML
INJECTION INTRAMUSCULAR; INTRAVENOUS; SUBCUTANEOUS
Status: COMPLETED
Start: 2021-01-01 | End: 2021-01-01

## 2021-01-01 RX ORDER — BENZALKONIUM CHLORIDE 1.3 MG/ML
CLOTH TOPICAL
COMMUNITY

## 2021-01-01 RX ORDER — NALOXONE HYDROCHLORIDE 0.4 MG/ML
0.4 INJECTION, SOLUTION INTRAMUSCULAR; INTRAVENOUS; SUBCUTANEOUS
Status: DISCONTINUED | OUTPATIENT
Start: 2021-01-01 | End: 2021-01-01 | Stop reason: HOSPADM

## 2021-01-01 RX ORDER — NICOTINE POLACRILEX 4 MG
15-30 LOZENGE BUCCAL
Status: DISCONTINUED | OUTPATIENT
Start: 2021-01-01 | End: 2021-01-01

## 2021-01-01 RX ORDER — CETIRIZINE HYDROCHLORIDE 5 MG/1
5 TABLET ORAL DAILY
Status: DISCONTINUED | OUTPATIENT
Start: 2021-01-01 | End: 2021-01-01 | Stop reason: HOSPADM

## 2021-01-01 RX ORDER — LOSARTAN POTASSIUM 50 MG/1
50 TABLET ORAL DAILY
Qty: 90 TABLET | Refills: 3 | Status: SHIPPED | OUTPATIENT
Start: 2021-01-01 | End: 2021-01-01

## 2021-01-01 RX ORDER — LORAZEPAM 2 MG/ML
0.5 INJECTION INTRAMUSCULAR
Status: DISCONTINUED | OUTPATIENT
Start: 2021-01-01 | End: 2021-01-01 | Stop reason: HOSPADM

## 2021-01-01 RX ORDER — CEPHALEXIN 500 MG/1
500 CAPSULE ORAL 3 TIMES DAILY
Qty: 21 CAPSULE | Refills: 0 | Status: SHIPPED | OUTPATIENT
Start: 2021-01-01 | End: 2021-01-01

## 2021-01-01 RX ORDER — NOREPINEPHRINE BITARTRATE 0.02 MG/ML
.01-.6 INJECTION, SOLUTION INTRAVENOUS CONTINUOUS
Status: DISCONTINUED | OUTPATIENT
Start: 2021-01-01 | End: 2021-01-01 | Stop reason: DRUGHIGH

## 2021-01-01 RX ORDER — DEXAMETHASONE SODIUM PHOSPHATE 10 MG/ML
6 INJECTION, SOLUTION INTRAMUSCULAR; INTRAVENOUS DAILY
Status: DISCONTINUED | OUTPATIENT
Start: 2021-01-01 | End: 2021-01-01 | Stop reason: HOSPADM

## 2021-01-01 RX ORDER — ACETAMINOPHEN 650 MG/1
650 SUPPOSITORY RECTAL EVERY 4 HOURS PRN
Status: DISCONTINUED | OUTPATIENT
Start: 2021-01-01 | End: 2021-01-01 | Stop reason: HOSPADM

## 2021-01-01 RX ORDER — SODIUM CHLORIDE, SODIUM LACTATE, POTASSIUM CHLORIDE, CALCIUM CHLORIDE 600; 310; 30; 20 MG/100ML; MG/100ML; MG/100ML; MG/100ML
INJECTION, SOLUTION INTRAVENOUS CONTINUOUS
Status: DISCONTINUED | OUTPATIENT
Start: 2021-01-01 | End: 2021-01-01 | Stop reason: HOSPADM

## 2021-01-01 RX ORDER — ALBUTEROL SULFATE 90 UG/1
2 AEROSOL, METERED RESPIRATORY (INHALATION) ONCE
Status: COMPLETED | OUTPATIENT
Start: 2021-01-01 | End: 2021-01-01

## 2021-01-01 RX ORDER — DAPAGLIFLOZIN 5 MG/1
5 TABLET, FILM COATED ORAL DAILY
Status: DISCONTINUED | OUTPATIENT
Start: 2021-01-01 | End: 2021-01-01

## 2021-01-01 RX ORDER — LANOLIN ALCOHOL/MO/W.PET/CERES
3 CREAM (GRAM) TOPICAL
Status: DISCONTINUED | OUTPATIENT
Start: 2021-01-01 | End: 2021-01-01 | Stop reason: HOSPADM

## 2021-01-01 RX ORDER — NICOTINE POLACRILEX 4 MG
15-30 LOZENGE BUCCAL
Status: DISCONTINUED | OUTPATIENT
Start: 2021-01-01 | End: 2021-01-01 | Stop reason: HOSPADM

## 2021-01-01 RX ORDER — AMOXICILLIN 250 MG
1 CAPSULE ORAL 2 TIMES DAILY PRN
Status: DISCONTINUED | OUTPATIENT
Start: 2021-01-01 | End: 2021-01-01 | Stop reason: HOSPADM

## 2021-01-01 RX ORDER — CLINDAMYCIN HCL 300 MG
300 CAPSULE ORAL 4 TIMES DAILY
Qty: 28 CAPSULE | Refills: 0 | Status: SHIPPED | OUTPATIENT
Start: 2021-01-01 | End: 2021-01-01

## 2021-01-01 RX ORDER — ALBUTEROL SULFATE 90 UG/1
2 AEROSOL, METERED RESPIRATORY (INHALATION)
Status: DISCONTINUED | OUTPATIENT
Start: 2021-01-01 | End: 2021-01-01 | Stop reason: HOSPADM

## 2021-01-01 RX ORDER — MULTIVIT-MIN/IRON/FOLIC ACID/K 18-600-40
1000 CAPSULE ORAL DAILY
COMMUNITY

## 2021-01-01 RX ORDER — DAPAGLIFLOZIN 5 MG/1
5 TABLET, FILM COATED ORAL DAILY
Qty: 30 TABLET | Refills: 1 | Status: SHIPPED | OUTPATIENT
Start: 2021-01-01

## 2021-01-01 RX ORDER — MULTIVIT WITH MINERALS/LUTEIN
1000 TABLET ORAL DAILY
COMMUNITY

## 2021-01-01 RX ORDER — BLOOD SUGAR DIAGNOSTIC
STRIP MISCELLANEOUS
COMMUNITY
Start: 2021-01-01 | End: 2021-01-01

## 2021-01-01 RX ORDER — BLOOD SUGAR DIAGNOSTIC
STRIP MISCELLANEOUS
Qty: 100 STRIP | Refills: 3 | Status: SHIPPED | OUTPATIENT
Start: 2021-01-01

## 2021-01-01 RX ORDER — FENTANYL CITRATE 50 UG/ML
1 INJECTION, SOLUTION INTRAMUSCULAR; INTRAVENOUS ONCE
Status: COMPLETED | OUTPATIENT
Start: 2021-01-01 | End: 2021-01-01

## 2021-01-01 RX ORDER — OLANZAPINE 10 MG/2ML
5 INJECTION, POWDER, FOR SOLUTION INTRAMUSCULAR ONCE
Status: COMPLETED | OUTPATIENT
Start: 2021-01-01 | End: 2021-01-01

## 2021-01-01 RX ORDER — PROPOFOL 10 MG/ML
5-75 INJECTION, EMULSION INTRAVENOUS CONTINUOUS
Status: DISCONTINUED | OUTPATIENT
Start: 2021-01-01 | End: 2021-01-01 | Stop reason: HOSPADM

## 2021-01-01 RX ORDER — IOPAMIDOL 755 MG/ML
500 INJECTION, SOLUTION INTRAVASCULAR ONCE
Status: COMPLETED | OUTPATIENT
Start: 2021-01-01 | End: 2021-01-01

## 2021-01-01 RX ORDER — CALCIUM CHLORIDE 100 MG/ML
1 INJECTION INTRAVENOUS; INTRAVENTRICULAR ONCE
Status: DISCONTINUED | OUTPATIENT
Start: 2021-01-01 | End: 2021-01-01 | Stop reason: HOSPADM

## 2021-01-01 RX ORDER — VITAMIN B COMPLEX
1 CAPSULE ORAL DAILY
COMMUNITY

## 2021-01-01 RX ORDER — ROCURONIUM BROMIDE 50 MG/5 ML
100 SYRINGE (ML) INTRAVENOUS ONCE
Status: COMPLETED | OUTPATIENT
Start: 2021-01-01 | End: 2021-01-01

## 2021-01-01 RX ORDER — ATORVASTATIN CALCIUM 10 MG/1
20 TABLET, FILM COATED ORAL DAILY
Status: DISCONTINUED | OUTPATIENT
Start: 2021-01-01 | End: 2021-01-01 | Stop reason: HOSPADM

## 2021-01-01 RX ORDER — CHLORHEXIDINE GLUCONATE ORAL RINSE 1.2 MG/ML
15 SOLUTION DENTAL EVERY 12 HOURS
Status: DISCONTINUED | OUTPATIENT
Start: 2021-01-01 | End: 2021-01-01 | Stop reason: HOSPADM

## 2021-01-01 RX ORDER — LOSARTAN POTASSIUM 50 MG/1
50 TABLET ORAL DAILY
Status: DISCONTINUED | OUTPATIENT
Start: 2021-01-01 | End: 2021-01-01 | Stop reason: HOSPADM

## 2021-01-01 RX ORDER — LOSARTAN POTASSIUM 50 MG/1
50 TABLET ORAL DAILY
Qty: 90 TABLET | Refills: 1 | Status: SHIPPED | OUTPATIENT
Start: 2021-01-01

## 2021-01-01 RX ORDER — DEXMEDETOMIDINE HYDROCHLORIDE 4 UG/ML
0.2-0.7 INJECTION, SOLUTION INTRAVENOUS CONTINUOUS
Status: DISCONTINUED | OUTPATIENT
Start: 2021-01-01 | End: 2021-01-01 | Stop reason: HOSPADM

## 2021-01-01 RX ORDER — AMOXICILLIN 250 MG
2 CAPSULE ORAL 2 TIMES DAILY PRN
Status: DISCONTINUED | OUTPATIENT
Start: 2021-01-01 | End: 2021-01-01 | Stop reason: HOSPADM

## 2021-01-01 RX ORDER — NOREPINEPHRINE BITARTRATE 0.06 MG/ML
.01-.6 INJECTION, SOLUTION INTRAVENOUS CONTINUOUS
Status: DISCONTINUED | OUTPATIENT
Start: 2021-01-01 | End: 2021-01-01 | Stop reason: HOSPADM

## 2021-01-01 RX ORDER — AMOXICILLIN 250 MG
1 CAPSULE ORAL 2 TIMES DAILY PRN
Status: DISCONTINUED | OUTPATIENT
Start: 2021-01-01 | End: 2021-01-01

## 2021-01-01 RX ORDER — CLINDAMYCIN HCL 300 MG
300 CAPSULE ORAL 4 TIMES DAILY
Qty: 40 CAPSULE | Refills: 0 | Status: SHIPPED | OUTPATIENT
Start: 2021-01-01 | End: 2021-01-01

## 2021-01-01 RX ORDER — OLANZAPINE 10 MG/2ML
10 INJECTION, POWDER, FOR SOLUTION INTRAMUSCULAR ONCE
Status: COMPLETED | OUTPATIENT
Start: 2021-01-01 | End: 2021-01-01

## 2021-01-01 RX ORDER — GUAIFENESIN 600 MG/1
15 TABLET, EXTENDED RELEASE ORAL DAILY
Status: DISCONTINUED | OUTPATIENT
Start: 2021-01-01 | End: 2021-01-01 | Stop reason: HOSPADM

## 2021-01-01 RX ORDER — MORPHINE SULFATE 2 MG/ML
2 INJECTION, SOLUTION INTRAMUSCULAR; INTRAVENOUS
Status: COMPLETED | OUTPATIENT
Start: 2021-01-01 | End: 2021-01-01

## 2021-01-01 RX ORDER — AMOXICILLIN 250 MG
2 CAPSULE ORAL 2 TIMES DAILY PRN
Status: DISCONTINUED | OUTPATIENT
Start: 2021-01-01 | End: 2021-01-01

## 2021-01-01 RX ORDER — BLOOD-GLUCOSE CALIB. CONTROL
COMBINATION PACKAGE (EA) MISCELLANEOUS
COMMUNITY

## 2021-01-01 RX ORDER — DEXTROSE MONOHYDRATE 25 G/50ML
25-50 INJECTION, SOLUTION INTRAVENOUS
Status: DISCONTINUED | OUTPATIENT
Start: 2021-01-01 | End: 2021-01-01 | Stop reason: HOSPADM

## 2021-01-01 RX ORDER — MUPIROCIN 20 MG/G
OINTMENT TOPICAL 3 TIMES DAILY
Qty: 22 G | Refills: 0 | Status: SHIPPED | OUTPATIENT
Start: 2021-01-01 | End: 2021-01-01

## 2021-01-01 RX ORDER — FENTANYL CITRATE 50 UG/ML
50 INJECTION, SOLUTION INTRAMUSCULAR; INTRAVENOUS
Status: DISCONTINUED | OUTPATIENT
Start: 2021-01-01 | End: 2021-01-01 | Stop reason: HOSPADM

## 2021-01-01 RX ORDER — ASPIRIN 81 MG/1
81 TABLET ORAL DAILY
Status: DISCONTINUED | OUTPATIENT
Start: 2021-01-01 | End: 2021-01-01 | Stop reason: HOSPADM

## 2021-01-01 RX ORDER — PROPOFOL 10 MG/ML
INJECTION, EMULSION INTRAVENOUS
Status: COMPLETED
Start: 2021-01-01 | End: 2021-01-01

## 2021-01-01 RX ORDER — ACETAMINOPHEN 325 MG/1
650 TABLET ORAL EVERY 4 HOURS PRN
Status: DISCONTINUED | OUTPATIENT
Start: 2021-01-01 | End: 2021-01-01 | Stop reason: HOSPADM

## 2021-01-01 RX ORDER — CLINDAMYCIN HCL 300 MG
300 CAPSULE ORAL 4 TIMES DAILY
Qty: 28 CAPSULE | Refills: 0 | OUTPATIENT
Start: 2021-01-01

## 2021-01-01 RX ORDER — PIPERACILLIN SODIUM, TAZOBACTAM SODIUM 4; .5 G/20ML; G/20ML
4.5 INJECTION, POWDER, LYOPHILIZED, FOR SOLUTION INTRAVENOUS EVERY 6 HOURS
Status: DISCONTINUED | OUTPATIENT
Start: 2021-01-01 | End: 2021-01-01

## 2021-01-01 RX ORDER — NALOXONE HYDROCHLORIDE 0.4 MG/ML
0.2 INJECTION, SOLUTION INTRAMUSCULAR; INTRAVENOUS; SUBCUTANEOUS
Status: DISCONTINUED | OUTPATIENT
Start: 2021-01-01 | End: 2021-01-01 | Stop reason: HOSPADM

## 2021-01-01 RX ORDER — DEXTROSE MONOHYDRATE 25 G/50ML
25-50 INJECTION, SOLUTION INTRAVENOUS
Status: DISCONTINUED | OUTPATIENT
Start: 2021-01-01 | End: 2021-01-01

## 2021-01-01 RX ORDER — DEXTROSE MONOHYDRATE 100 MG/ML
INJECTION, SOLUTION INTRAVENOUS CONTINUOUS PRN
Status: DISCONTINUED | OUTPATIENT
Start: 2021-01-01 | End: 2021-01-01 | Stop reason: HOSPADM

## 2021-01-01 RX ORDER — CLINDAMYCIN HCL 300 MG
300 CAPSULE ORAL 4 TIMES DAILY
Qty: 21 CAPSULE | Refills: 0 | Status: SHIPPED | OUTPATIENT
Start: 2021-01-01 | End: 2021-01-01

## 2021-01-01 RX ORDER — HYDROXYZINE HYDROCHLORIDE 25 MG/1
25 TABLET, FILM COATED ORAL
Status: DISCONTINUED | OUTPATIENT
Start: 2021-01-01 | End: 2021-01-01 | Stop reason: HOSPADM

## 2021-01-01 RX ORDER — FENTANYL CITRATE 50 UG/ML
25 INJECTION, SOLUTION INTRAMUSCULAR; INTRAVENOUS EVERY 5 MIN PRN
Status: DISCONTINUED | OUTPATIENT
Start: 2021-01-01 | End: 2021-01-01 | Stop reason: HOSPADM

## 2021-01-01 RX ORDER — NOREPINEPHRINE BITARTRATE 0.02 MG/ML
.01-.6 INJECTION, SOLUTION INTRAVENOUS CONTINUOUS
Status: DISCONTINUED | OUTPATIENT
Start: 2021-01-01 | End: 2021-01-01 | Stop reason: HOSPADM

## 2021-01-01 RX ORDER — CALCIUM CHLORIDE 100 MG/ML
1 INJECTION INTRAVENOUS; INTRAVENTRICULAR ONCE
Status: COMPLETED | OUTPATIENT
Start: 2021-01-01 | End: 2021-01-01

## 2021-01-01 RX ORDER — CALCIUM CHLORIDE 100 MG/ML
INJECTION INTRAVENOUS; INTRAVENTRICULAR
Status: COMPLETED
Start: 2021-01-01 | End: 2021-01-01

## 2021-01-01 RX ORDER — MIDAZOLAM HCL IN 0.9 % NACL/PF 1 MG/ML
1-8 PLASTIC BAG, INJECTION (ML) INTRAVENOUS CONTINUOUS
Status: DISCONTINUED | OUTPATIENT
Start: 2021-01-01 | End: 2021-01-01 | Stop reason: HOSPADM

## 2021-01-01 RX ORDER — DEXAMETHASONE SODIUM PHOSPHATE 10 MG/ML
6 INJECTION, SOLUTION INTRAMUSCULAR; INTRAVENOUS ONCE
Status: COMPLETED | OUTPATIENT
Start: 2021-01-01 | End: 2021-01-01

## 2021-01-01 RX ORDER — DEXAMETHASONE SODIUM PHOSPHATE 10 MG/ML
6 INJECTION, SOLUTION INTRAMUSCULAR; INTRAVENOUS EVERY 24 HOURS
Status: DISCONTINUED | OUTPATIENT
Start: 2021-01-01 | End: 2021-01-01 | Stop reason: HOSPADM

## 2021-01-01 RX ORDER — VECURONIUM BROMIDE 1 MG/ML
10 INJECTION, POWDER, LYOPHILIZED, FOR SOLUTION INTRAVENOUS ONCE
Status: COMPLETED | OUTPATIENT
Start: 2021-01-01 | End: 2021-01-01

## 2021-01-01 RX ORDER — ATORVASTATIN CALCIUM 20 MG/1
20 TABLET, FILM COATED ORAL DAILY
Qty: 90 TABLET | Refills: 3 | Status: SHIPPED | OUTPATIENT
Start: 2021-01-01 | End: 2021-01-01

## 2021-01-01 RX ORDER — SODIUM CHLORIDE 9 MG/ML
INJECTION, SOLUTION INTRAVENOUS CONTINUOUS
Status: DISCONTINUED | OUTPATIENT
Start: 2021-01-01 | End: 2021-01-01 | Stop reason: HOSPADM

## 2021-01-01 RX ORDER — ATORVASTATIN CALCIUM 20 MG/1
20 TABLET, FILM COATED ORAL DAILY
Qty: 90 TABLET | Refills: 1 | Status: SHIPPED | OUTPATIENT
Start: 2021-01-01

## 2021-01-01 RX ORDER — CEPHALEXIN 500 MG/1
CAPSULE ORAL
COMMUNITY
Start: 2021-01-01 | End: 2021-01-01

## 2021-01-01 RX ADMIN — SODIUM CHLORIDE 50 ML: 900 INJECTION INTRAVENOUS at 17:00

## 2021-01-01 RX ADMIN — SODIUM CHLORIDE 1.5 UNITS/HR: 9 INJECTION, SOLUTION INTRAVENOUS at 13:51

## 2021-01-01 RX ADMIN — PROPOFOL 35 MCG/KG/MIN: 10 INJECTION, EMULSION INTRAVENOUS at 06:32

## 2021-01-01 RX ADMIN — VASOPRESSIN 2.4 UNITS/HR: 20 INJECTION INTRAVENOUS at 06:35

## 2021-01-01 RX ADMIN — FENTANYL CITRATE 50 MCG/HR: 50 INJECTION INTRAVENOUS at 20:06

## 2021-01-01 RX ADMIN — SODIUM BICARBONATE 50 MEQ/HR: 84 INJECTION, SOLUTION INTRAVENOUS at 05:20

## 2021-01-01 RX ADMIN — CEFEPIME HYDROCHLORIDE 2 G: 2 INJECTION, POWDER, FOR SOLUTION INTRAVENOUS at 05:24

## 2021-01-01 RX ADMIN — OLANZAPINE 10 MG: 10 INJECTION, POWDER, FOR SOLUTION INTRAMUSCULAR at 00:11

## 2021-01-01 RX ADMIN — ACETAMINOPHEN 650 MG: 325 TABLET, FILM COATED ORAL at 00:07

## 2021-01-01 RX ADMIN — ACETAMINOPHEN 650 MG: 650 SUPPOSITORY RECTAL at 17:33

## 2021-01-01 RX ADMIN — PROPOFOL 40 MCG/KG/MIN: 10 INJECTION, EMULSION INTRAVENOUS at 11:32

## 2021-01-01 RX ADMIN — FENTANYL CITRATE 100 MCG/HR: 50 INJECTION INTRAVENOUS at 04:31

## 2021-01-01 RX ADMIN — MIDAZOLAM HYDROCHLORIDE 2 MG/HR: 5 INJECTION, SOLUTION INTRAMUSCULAR; INTRAVENOUS at 10:39

## 2021-01-01 RX ADMIN — PROPOFOL 55 MCG/KG/MIN: 10 INJECTION, EMULSION INTRAVENOUS at 00:50

## 2021-01-01 RX ADMIN — Medication 0.2 MCG/KG/MIN: at 07:56

## 2021-01-01 RX ADMIN — CEFEPIME HYDROCHLORIDE 2 G: 2 INJECTION, POWDER, FOR SOLUTION INTRAVENOUS at 22:11

## 2021-01-01 RX ADMIN — LOSARTAN POTASSIUM 50 MG: 50 TABLET, FILM COATED ORAL at 08:58

## 2021-01-01 RX ADMIN — VECURONIUM BROMIDE 10 MG: 1 INJECTION, POWDER, LYOPHILIZED, FOR SOLUTION INTRAVENOUS at 15:28

## 2021-01-01 RX ADMIN — ENOXAPARIN SODIUM 40 MG: 40 INJECTION SUBCUTANEOUS at 20:47

## 2021-01-01 RX ADMIN — FENTANYL CITRATE 85.5 MCG: 50 INJECTION, SOLUTION INTRAMUSCULAR; INTRAVENOUS at 10:09

## 2021-01-01 RX ADMIN — ROCURONIUM BROMIDE 100 MG: 10 INJECTION, SOLUTION INTRAVENOUS at 10:14

## 2021-01-01 RX ADMIN — CETIRIZINE HYDROCHLORIDE 5 MG: 1 SOLUTION ORAL at 08:56

## 2021-01-01 RX ADMIN — PROPOFOL 55 MCG/KG/MIN: 10 INJECTION, EMULSION INTRAVENOUS at 09:28

## 2021-01-01 RX ADMIN — FENTANYL CITRATE 50 MCG: 50 INJECTION, SOLUTION INTRAMUSCULAR; INTRAVENOUS at 02:51

## 2021-01-01 RX ADMIN — SODIUM BICARBONATE 100 MEQ: 84 INJECTION INTRAVENOUS at 00:19

## 2021-01-01 RX ADMIN — SODIUM CHLORIDE, POTASSIUM CHLORIDE, SODIUM LACTATE AND CALCIUM CHLORIDE 500 ML: 600; 310; 30; 20 INJECTION, SOLUTION INTRAVENOUS at 11:08

## 2021-01-01 RX ADMIN — HYDROXYZINE HYDROCHLORIDE 25 MG: 25 TABLET ORAL at 04:13

## 2021-01-01 RX ADMIN — OLANZAPINE 5 MG: 10 INJECTION, POWDER, FOR SOLUTION INTRAMUSCULAR at 21:41

## 2021-01-01 RX ADMIN — ENOXAPARIN SODIUM 40 MG: 40 INJECTION SUBCUTANEOUS at 08:58

## 2021-01-01 RX ADMIN — SUCCINYLCHOLINE CHLORIDE 120 MG: 20 INJECTION, SOLUTION INTRAMUSCULAR; INTRAVENOUS at 10:12

## 2021-01-01 RX ADMIN — CALCIUM CHLORIDE 1 G: 100 INJECTION INTRAVENOUS; INTRAVENTRICULAR at 05:22

## 2021-01-01 RX ADMIN — FENTANYL CITRATE 50 MCG: 50 INJECTION, SOLUTION INTRAMUSCULAR; INTRAVENOUS at 01:08

## 2021-01-01 RX ADMIN — SODIUM CHLORIDE 50 ML: 9 INJECTION, SOLUTION INTRAVENOUS at 19:02

## 2021-01-01 RX ADMIN — CHLORHEXIDINE GLUCONATE 15 ML: 1.2 SOLUTION ORAL at 07:58

## 2021-01-01 RX ADMIN — ENOXAPARIN SODIUM 40 MG: 40 INJECTION SUBCUTANEOUS at 20:29

## 2021-01-01 RX ADMIN — DEXAMETHASONE SODIUM PHOSPHATE 6 MG: 10 INJECTION INTRAMUSCULAR; INTRAVENOUS at 11:07

## 2021-01-01 RX ADMIN — VASOPRESSIN 2.4 UNITS/HR: 20 INJECTION INTRAVENOUS at 21:43

## 2021-01-01 RX ADMIN — DEXAMETHASONE SODIUM PHOSPHATE 6 MG: 10 INJECTION INTRAMUSCULAR; INTRAVENOUS at 12:54

## 2021-01-01 RX ADMIN — PROPOFOL 50 MCG/KG/MIN: 10 INJECTION, EMULSION INTRAVENOUS at 04:45

## 2021-01-01 RX ADMIN — MIDAZOLAM 10 MG: 1 INJECTION INTRAMUSCULAR; INTRAVENOUS at 10:10

## 2021-01-01 RX ADMIN — ENOXAPARIN SODIUM 40 MG: 40 INJECTION SUBCUTANEOUS at 09:04

## 2021-01-01 RX ADMIN — SODIUM BICARBONATE 25 MEQ/HR: 84 INJECTION, SOLUTION INTRAVENOUS at 00:51

## 2021-01-01 RX ADMIN — REMDESIVIR 100 MG: 100 INJECTION, POWDER, LYOPHILIZED, FOR SOLUTION INTRAVENOUS at 16:15

## 2021-01-01 RX ADMIN — FENTANYL CITRATE 100 MCG/HR: 50 INJECTION INTRAVENOUS at 06:38

## 2021-01-01 RX ADMIN — Medication 0.6 MCG/KG/MIN: at 06:31

## 2021-01-01 RX ADMIN — VECURONIUM BROMIDE 0.8 MCG/KG/MIN: 1 INJECTION, POWDER, LYOPHILIZED, FOR SOLUTION INTRAVENOUS at 00:24

## 2021-01-01 RX ADMIN — ALBUTEROL SULFATE 2 PUFF: 90 AEROSOL, METERED RESPIRATORY (INHALATION) at 06:05

## 2021-01-01 RX ADMIN — MINERAL OIL AND PETROLATUM: 150; 830 OINTMENT OPHTHALMIC at 15:24

## 2021-01-01 RX ADMIN — METFORMIN HYDROCHLORIDE 1000 MG: 500 TABLET ORAL at 20:00

## 2021-01-01 RX ADMIN — PROPOFOL 55 MCG/KG/MIN: 10 INJECTION, EMULSION INTRAVENOUS at 06:05

## 2021-01-01 RX ADMIN — Medication 0.5 MCG/KG/MIN: at 00:35

## 2021-01-01 RX ADMIN — IOPAMIDOL 70 ML: 755 INJECTION, SOLUTION INTRAVENOUS at 16:27

## 2021-01-01 RX ADMIN — WATER 10 ML: 1 INJECTION INTRAMUSCULAR; INTRAVENOUS; SUBCUTANEOUS at 15:48

## 2021-01-01 RX ADMIN — DEXMEDETOMIDINE 0.2 MCG/KG/HR: 100 INJECTION, SOLUTION, CONCENTRATE INTRAVENOUS at 06:42

## 2021-01-01 RX ADMIN — MULTIVIT AND MINERALS-FERROUS GLUCONATE 9 MG IRON/15 ML ORAL LIQUID 15 ML: at 08:04

## 2021-01-01 RX ADMIN — ALBUTEROL SULFATE 2 PUFF: 90 AEROSOL, METERED RESPIRATORY (INHALATION) at 15:50

## 2021-01-01 RX ADMIN — FENTANYL CITRATE 25 MCG: 50 INJECTION, SOLUTION INTRAMUSCULAR; INTRAVENOUS at 11:32

## 2021-01-01 RX ADMIN — SODIUM CHLORIDE, POTASSIUM CHLORIDE, SODIUM LACTATE AND CALCIUM CHLORIDE: 600; 310; 30; 20 INJECTION, SOLUTION INTRAVENOUS at 21:49

## 2021-01-01 RX ADMIN — MORPHINE SULFATE 2 MG: 2 INJECTION, SOLUTION INTRAMUSCULAR; INTRAVENOUS at 18:32

## 2021-01-01 RX ADMIN — REMDESIVIR 100 MG: 100 INJECTION, POWDER, LYOPHILIZED, FOR SOLUTION INTRAVENOUS at 17:55

## 2021-01-01 RX ADMIN — LORAZEPAM 0.5 MG: 2 INJECTION INTRAMUSCULAR; INTRAVENOUS at 18:27

## 2021-01-01 RX ADMIN — ASPIRIN 81 MG: 81 TABLET ORAL at 08:57

## 2021-01-01 RX ADMIN — CHLORHEXIDINE GLUCONATE 15 ML: 1.2 SOLUTION ORAL at 20:29

## 2021-01-01 RX ADMIN — PROPOFOL 55 MCG/KG/MIN: 10 INJECTION, EMULSION INTRAVENOUS at 19:00

## 2021-01-01 RX ADMIN — DEXAMETHASONE SODIUM PHOSPHATE 6 MG: 10 INJECTION, SOLUTION INTRAMUSCULAR; INTRAVENOUS at 10:11

## 2021-01-01 RX ADMIN — PROPOFOL 55 MCG/KG/MIN: 10 INJECTION, EMULSION INTRAVENOUS at 15:49

## 2021-01-01 RX ADMIN — ENOXAPARIN SODIUM 40 MG: 40 INJECTION SUBCUTANEOUS at 10:11

## 2021-01-01 RX ADMIN — SODIUM CHLORIDE 2 UNITS/HR: 9 INJECTION, SOLUTION INTRAVENOUS at 08:29

## 2021-01-01 RX ADMIN — CHLORHEXIDINE GLUCONATE 15 ML: 1.2 SOLUTION ORAL at 19:52

## 2021-01-01 RX ADMIN — Medication 0.4 MCG/KG/MIN: at 15:47

## 2021-01-01 RX ADMIN — MORPHINE SULFATE 2 MG: 2 INJECTION, SOLUTION INTRAMUSCULAR; INTRAVENOUS at 11:07

## 2021-01-01 RX ADMIN — CHLORHEXIDINE GLUCONATE 15 ML: 1.2 SOLUTION ORAL at 19:39

## 2021-01-01 RX ADMIN — SODIUM CHLORIDE 50 ML: 900 INJECTION INTRAVENOUS at 17:57

## 2021-01-01 RX ADMIN — CHLORHEXIDINE GLUCONATE 15 ML: 1.2 SOLUTION ORAL at 08:33

## 2021-01-01 RX ADMIN — PROPOFOL 55 MCG/KG/MIN: 10 INJECTION, EMULSION INTRAVENOUS at 12:54

## 2021-01-01 RX ADMIN — Medication 3 MG: at 02:05

## 2021-01-01 RX ADMIN — ATORVASTATIN CALCIUM 20 MG: 10 TABLET, FILM COATED ORAL at 08:56

## 2021-01-01 RX ADMIN — PIPERACILLIN SODIUM AND TAZOBACTAM SODIUM 4.5 G: 4; .5 INJECTION, POWDER, LYOPHILIZED, FOR SOLUTION INTRAVENOUS at 21:26

## 2021-01-01 RX ADMIN — ALBUTEROL SULFATE 2 PUFF: 90 AEROSOL, METERED RESPIRATORY (INHALATION) at 16:11

## 2021-01-01 RX ADMIN — ENOXAPARIN SODIUM 40 MG: 40 INJECTION SUBCUTANEOUS at 20:00

## 2021-01-01 RX ADMIN — DEXAMETHASONE SODIUM PHOSPHATE 6 MG: 10 INJECTION, SOLUTION INTRAMUSCULAR; INTRAVENOUS at 16:22

## 2021-01-01 RX ADMIN — DEXAMETHASONE SODIUM PHOSPHATE 6 MG: 10 INJECTION INTRAMUSCULAR; INTRAVENOUS at 15:50

## 2021-01-01 RX ADMIN — SODIUM CHLORIDE 50 ML: 900 INJECTION INTRAVENOUS at 16:48

## 2021-01-01 RX ADMIN — Medication 0.06 MCG/KG/MIN: at 02:07

## 2021-01-01 RX ADMIN — DEXAMETHASONE SODIUM PHOSPHATE 6 MG: 10 INJECTION, SOLUTION INTRAMUSCULAR; INTRAVENOUS at 09:01

## 2021-01-01 RX ADMIN — MINERAL OIL AND PETROLATUM: 150; 830 OINTMENT OPHTHALMIC at 23:05

## 2021-01-01 RX ADMIN — Medication 0.13 MCG/KG/MIN: at 08:43

## 2021-01-01 RX ADMIN — SODIUM BICARBONATE 50 MEQ/HR: 84 INJECTION, SOLUTION INTRAVENOUS at 03:02

## 2021-01-01 RX ADMIN — PROPOFOL 10 MCG/KG/MIN: 10 INJECTION, EMULSION INTRAVENOUS at 10:13

## 2021-01-01 RX ADMIN — VECURONIUM BROMIDE 0.8 MCG/KG/MIN: 1 INJECTION, POWDER, LYOPHILIZED, FOR SOLUTION INTRAVENOUS at 15:40

## 2021-01-01 RX ADMIN — NOREPINEPHRINE BITARTRATE 0.03 MCG/KG/MIN: 1 INJECTION, SOLUTION, CONCENTRATE INTRAVENOUS at 13:15

## 2021-01-01 RX ADMIN — EPOPROSTENOL 20 NG/KG/MIN: 1.5 INJECTION, POWDER, LYOPHILIZED, FOR SOLUTION INTRAVENOUS at 15:23

## 2021-01-01 RX ADMIN — SODIUM CHLORIDE 3 UNITS/HR: 9 INJECTION, SOLUTION INTRAVENOUS at 00:10

## 2021-01-01 RX ADMIN — Medication 0.5 MCG/KG/MIN: at 16:15

## 2021-01-01 RX ADMIN — PROPOFOL 35 MCG/KG/MIN: 10 INJECTION, EMULSION INTRAVENOUS at 00:17

## 2021-01-01 RX ADMIN — Medication 0.15 MCG/KG/MIN: at 19:00

## 2021-01-01 RX ADMIN — ENOXAPARIN SODIUM 40 MG: 40 INJECTION SUBCUTANEOUS at 22:33

## 2021-01-01 RX ADMIN — Medication 0.3 MCG/KG/MIN: at 14:04

## 2021-01-01 RX ADMIN — DEXAMETHASONE SODIUM PHOSPHATE 6 MG: 10 INJECTION, SOLUTION INTRAMUSCULAR; INTRAVENOUS at 09:16

## 2021-01-01 RX ADMIN — PROPOFOL 55 MCG/KG/MIN: 10 INJECTION, EMULSION INTRAVENOUS at 15:47

## 2021-01-01 RX ADMIN — SODIUM CHLORIDE 4.5 UNITS/HR: 9 INJECTION, SOLUTION INTRAVENOUS at 02:56

## 2021-01-01 RX ADMIN — PROPOFOL 55 MCG/KG/MIN: 10 INJECTION, EMULSION INTRAVENOUS at 19:38

## 2021-01-01 RX ADMIN — ALBUTEROL SULFATE 2 PUFF: 90 AEROSOL, METERED RESPIRATORY (INHALATION) at 19:21

## 2021-01-01 RX ADMIN — Medication 40 MG: at 07:58

## 2021-01-01 RX ADMIN — EMPAGLIFLOZIN 10 MG: 10 TABLET, FILM COATED ORAL at 08:57

## 2021-01-01 RX ADMIN — MORPHINE SULFATE 2 MG: 2 INJECTION, SOLUTION INTRAMUSCULAR; INTRAVENOUS at 12:09

## 2021-01-01 RX ADMIN — EPOPROSTENOL 20 NG/KG/MIN: 1.5 INJECTION, POWDER, LYOPHILIZED, FOR SOLUTION INTRAVENOUS at 09:42

## 2021-01-01 RX ADMIN — LORAZEPAM 0.5 MG: 2 INJECTION INTRAMUSCULAR; INTRAVENOUS at 21:01

## 2021-01-01 RX ADMIN — PROPOFOL 35 MCG/KG/MIN: 10 INJECTION, EMULSION INTRAVENOUS at 16:17

## 2021-01-01 RX ADMIN — SODIUM CHLORIDE: 9 INJECTION, SOLUTION INTRAVENOUS at 11:32

## 2021-01-01 RX ADMIN — ANGIOTENSIN II 20 NG/KG/MIN: 2.5 INJECTION INTRAVENOUS at 20:23

## 2021-01-01 RX ADMIN — EPOPROSTENOL 20 NG/KG/MIN: 1.5 INJECTION, POWDER, LYOPHILIZED, FOR SOLUTION INTRAVENOUS at 02:02

## 2021-01-01 RX ADMIN — EPOPROSTENOL 20 NG/KG/MIN: 1.5 INJECTION, POWDER, LYOPHILIZED, FOR SOLUTION INTRAVENOUS at 03:02

## 2021-01-01 RX ADMIN — PIPERACILLIN SODIUM AND TAZOBACTAM SODIUM 4.5 G: 4; .5 INJECTION, POWDER, LYOPHILIZED, FOR SOLUTION INTRAVENOUS at 15:47

## 2021-01-01 RX ADMIN — PROPOFOL 55 MCG/KG/MIN: 10 INJECTION, EMULSION INTRAVENOUS at 18:17

## 2021-01-01 RX ADMIN — ENOXAPARIN SODIUM 40 MG: 40 INJECTION SUBCUTANEOUS at 21:33

## 2021-01-01 RX ADMIN — LORAZEPAM 0.5 MG: 2 INJECTION INTRAMUSCULAR; INTRAVENOUS at 12:05

## 2021-01-01 RX ADMIN — PROPOFOL 30 MCG/KG/MIN: 10 INJECTION, EMULSION INTRAVENOUS at 02:06

## 2021-01-01 RX ADMIN — REMDESIVIR 100 MG: 100 INJECTION, POWDER, LYOPHILIZED, FOR SOLUTION INTRAVENOUS at 16:56

## 2021-01-01 RX ADMIN — VASOPRESSIN 2.4 UNITS/HR: 20 INJECTION INTRAVENOUS at 14:51

## 2021-01-01 RX ADMIN — PROPOFOL 55 MCG/KG/MIN: 10 INJECTION, EMULSION INTRAVENOUS at 21:46

## 2021-01-01 RX ADMIN — PROPOFOL 35 MCG/KG/MIN: 10 INJECTION, EMULSION INTRAVENOUS at 01:45

## 2021-01-01 RX ADMIN — Medication 0.3 MCG/KG/MIN: at 11:13

## 2021-01-01 RX ADMIN — ACETAMINOPHEN 650 MG: 325 TABLET, FILM COATED ORAL at 14:25

## 2021-01-01 RX ADMIN — EPOPROSTENOL 20 NG/KG/MIN: 1.5 INJECTION, POWDER, LYOPHILIZED, FOR SOLUTION INTRAVENOUS at 13:53

## 2021-01-01 RX ADMIN — EPOPROSTENOL 20 NG/KG/MIN: 1.5 INJECTION, POWDER, LYOPHILIZED, FOR SOLUTION INTRAVENOUS at 08:49

## 2021-01-01 RX ADMIN — Medication 100 MEQ: at 00:19

## 2021-01-01 RX ADMIN — ENOXAPARIN SODIUM 40 MG: 40 INJECTION SUBCUTANEOUS at 20:39

## 2021-01-01 RX ADMIN — PROPOFOL 55 MCG/KG/MIN: 10 INJECTION, EMULSION INTRAVENOUS at 21:42

## 2021-01-01 RX ADMIN — REMDESIVIR 200 MG: 100 INJECTION, POWDER, LYOPHILIZED, FOR SOLUTION INTRAVENOUS at 19:01

## 2021-01-01 RX ADMIN — ENOXAPARIN SODIUM 40 MG: 40 INJECTION SUBCUTANEOUS at 20:44

## 2021-01-01 RX ADMIN — PANTOPRAZOLE SODIUM 40 MG: 40 INJECTION, POWDER, FOR SOLUTION INTRAVENOUS at 08:30

## 2021-01-01 RX ADMIN — MULTIVIT AND MINERALS-FERROUS GLUCONATE 9 MG IRON/15 ML ORAL LIQUID 15 ML: at 17:32

## 2021-01-01 RX ADMIN — ENOXAPARIN SODIUM 40 MG: 40 INJECTION SUBCUTANEOUS at 08:33

## 2021-01-01 RX ADMIN — ACETAMINOPHEN 650 MG: 650 SUPPOSITORY RECTAL at 02:33

## 2021-01-01 RX ADMIN — Medication 0.5 MCG/KG/MIN: at 17:57

## 2021-01-01 RX ADMIN — EPOPROSTENOL 20 NG/KG/MIN: 1.5 INJECTION, POWDER, LYOPHILIZED, FOR SOLUTION INTRAVENOUS at 18:59

## 2021-01-01 RX ADMIN — ENOXAPARIN SODIUM 40 MG: 40 INJECTION SUBCUTANEOUS at 07:58

## 2021-01-01 RX ADMIN — EPOPROSTENOL 20 NG/KG/MIN: 1.5 INJECTION, POWDER, LYOPHILIZED, FOR SOLUTION INTRAVENOUS at 20:49

## 2021-01-01 RX ADMIN — METFORMIN HYDROCHLORIDE 1000 MG: 500 TABLET ORAL at 17:56

## 2021-01-01 RX ADMIN — PROPOFOL 50 MCG/KG/MIN: 10 INJECTION, EMULSION INTRAVENOUS at 07:58

## 2021-01-01 RX ADMIN — Medication 0.12 MCG/KG/MIN: at 00:49

## 2021-01-01 RX ADMIN — SODIUM CHLORIDE 70 ML: 9 INJECTION, SOLUTION INTRAVENOUS at 16:28

## 2021-01-01 RX ADMIN — PROPOFOL 35 MCG/KG/MIN: 10 INJECTION, EMULSION INTRAVENOUS at 04:26

## 2021-01-01 RX ADMIN — SODIUM CHLORIDE, POTASSIUM CHLORIDE, SODIUM LACTATE AND CALCIUM CHLORIDE: 600; 310; 30; 20 INJECTION, SOLUTION INTRAVENOUS at 11:08

## 2021-01-01 RX ADMIN — LORAZEPAM 0.5 MG: 2 INJECTION INTRAMUSCULAR; INTRAVENOUS at 04:25

## 2021-01-01 RX ADMIN — SODIUM CHLORIDE: 9 INJECTION, SOLUTION INTRAVENOUS at 19:25

## 2021-01-01 RX ADMIN — REMDESIVIR 100 MG: 100 INJECTION, POWDER, LYOPHILIZED, FOR SOLUTION INTRAVENOUS at 17:57

## 2021-01-01 RX ADMIN — Medication 0.13 MCG/KG/MIN: at 13:52

## 2021-01-01 RX ADMIN — HALOPERIDOL LACTATE 2 MG: 5 INJECTION, SOLUTION INTRAMUSCULAR at 06:23

## 2021-01-01 RX ADMIN — ENOXAPARIN SODIUM 40 MG: 40 INJECTION SUBCUTANEOUS at 09:20

## 2021-01-01 RX ADMIN — SODIUM CHLORIDE 50 ML: 900 INJECTION INTRAVENOUS at 18:08

## 2021-01-01 RX ADMIN — METFORMIN HYDROCHLORIDE 1000 MG: 500 TABLET ORAL at 08:57

## 2021-01-01 ASSESSMENT — ACTIVITIES OF DAILY LIVING (ADL)
ADLS_ACUITY_SCORE: 16
ADLS_ACUITY_SCORE: 12
ADLS_ACUITY_SCORE: 18
ADLS_ACUITY_SCORE: 20
ADLS_ACUITY_SCORE: 18
ADLS_ACUITY_SCORE: 20
ADLS_ACUITY_SCORE: 16
ADLS_ACUITY_SCORE: 20
ADLS_ACUITY_SCORE: 16
ADLS_ACUITY_SCORE: 20
ADLS_ACUITY_SCORE: 18
ADLS_ACUITY_SCORE: 16
ADLS_ACUITY_SCORE: 20
CURRENT_FUNCTION: NO ASSISTANCE NEEDED
ADLS_ACUITY_SCORE: 16
ADLS_ACUITY_SCORE: 20
ADLS_ACUITY_SCORE: 20
ADLS_ACUITY_SCORE: 16
ADLS_ACUITY_SCORE: 20
ADLS_ACUITY_SCORE: 20
ADLS_ACUITY_SCORE: 18
ADLS_ACUITY_SCORE: 20
ADLS_ACUITY_SCORE: 12
ADLS_ACUITY_SCORE: 16
ADLS_ACUITY_SCORE: 18
ADLS_ACUITY_SCORE: 20
ADLS_ACUITY_SCORE: 20
ADLS_ACUITY_SCORE: 16
ADLS_ACUITY_SCORE: 20
ADLS_ACUITY_SCORE: 18
ADLS_ACUITY_SCORE: 20
ADLS_ACUITY_SCORE: 18
ADLS_ACUITY_SCORE: 16
ADLS_ACUITY_SCORE: 20
ADLS_ACUITY_SCORE: 18
ADLS_ACUITY_SCORE: 16
ADLS_ACUITY_SCORE: 20
ADLS_ACUITY_SCORE: 20
ADLS_ACUITY_SCORE: 16
ADLS_ACUITY_SCORE: 18
ADLS_ACUITY_SCORE: 20
ADLS_ACUITY_SCORE: 20
ADLS_ACUITY_SCORE: 16
ADLS_ACUITY_SCORE: 20
ADLS_ACUITY_SCORE: 16
ADLS_ACUITY_SCORE: 16
ADLS_ACUITY_SCORE: 18
ADLS_ACUITY_SCORE: 18
ADLS_ACUITY_SCORE: 16
ADLS_ACUITY_SCORE: 20
ADLS_ACUITY_SCORE: 16
ADLS_ACUITY_SCORE: 20
ADLS_ACUITY_SCORE: 18

## 2021-01-01 ASSESSMENT — ENCOUNTER SYMPTOMS
ABDOMINAL PAIN: 0
DIARRHEA: 0
COUGH: 0
HEMATURIA: 0
HEMATOCHEZIA: 0
CHILLS: 0
CONSTIPATION: 0

## 2021-01-01 ASSESSMENT — PAIN SCALES - GENERAL
PAINLEVEL: NO PAIN (0)
PAINLEVEL: MODERATE PAIN (4)
PAINLEVEL: NO PAIN (0)
PAINLEVEL: MODERATE PAIN (5)

## 2021-01-01 ASSESSMENT — MIFFLIN-ST. JEOR: SCORE: 1579.33

## 2021-03-05 NOTE — PATIENT INSTRUCTIONS
Monitor symptoms closely  Follow up in the emergency room if you wake up tomorrow morning with worsening symptoms or if your symptoms have not improved on Sunday  Ibuprofen as needed for fever and pain  Continue Flonase twice daily

## 2021-03-05 NOTE — PROGRESS NOTES
Assessment & Plan     Impetigo  - cephALEXin (KEFLEX) 500 MG capsule; Take 1 capsule (500 mg) by mouth 3 times daily for 7 days  - mupirocin (BACTROBAN) 2 % external ointment; Apply topically 3 times daily    Cellulitis of face  - cephALEXin (KEFLEX) 500 MG capsule; Take 1 capsule (500 mg) by mouth 3 times daily for 7 days  - mupirocin (BACTROBAN) 2 % external ointment; Apply topically 3 times daily    Chronic hepatitis C without hepatic coma (H)  This diagnosis was taken into consideration when assessing and treating this patient today.    Type 2 diabetes mellitus without complication, without long-term current use of insulin (H)  This diagnosis was taken into consideration when assessing and treating this patient today.    Stressed close monitoring of symptoms.  If symptoms are worse tomorrow or not improving the following day, he needs to follow-up in emergency room for further evaluation, possible IV antibiotics and admission.    20 minutes spent on the date of the encounter doing chart review, patient visit and documentation     Return in about 2 days (around 3/7/2021) for ER visit if not improved.    JAMES Barrios [facility] FRANCISCO Morales is a 70 year old who presents for the following health issues  accompanied by his spouse:  Chief Complaint   Patient presents with     nose issue     x 3 days sore in right nostril,  right side and right cheek is swollen x 1 day, started out with a spot under his right eye, unable to wear his dentures, gums are swollen       HPI       Concern - sore in right nostril  and cheek  swollen  Onset: x 3 days  Description: started out with sore around right nostril, then right eye started getting puffy and now right check is swollen and unable to wear his dentures  Intensity: moderate, severe  Progression of Symptoms:  worsening  Accompanying Signs & Symptoms: nasal congestion on the right side but today it is not and headaches x 2  "days  Previous history of similar problem: no  Precipitating factors:        Worsened by: no  Alleviating factors:        Improved by: no  Therapies tried and outcome: Flonase and ibuprofen    Bonifacio presents to the clinic today with his wife for evaluation of a sore inside of his nose and swelling in his cheek.  He notes symptoms began 3 days ago with a \"pimple\" inside of his right nostril.  Over the next couple days this has worsened and now he is right cheek, eye and the inside of his mouth are all swollen, erythematous and tender.  He does not have any upper teeth and wears a plate.  He had this in when the swelling began and while he was eating he noted that the plate was rubbing on his right upper gumline increases sore.  He states he does have a little nasal congestion and has been using Flonase without significant relief.  He also has an associated headache and ibuprofen is helping to relieve this pain.  He does not have any changes to his vision. He has not had a fever but has been taking ibuprofen.    Review of Systems   ROS negative except as stated above.      Objective    /82 (BP Location: Right arm)   Pulse 80   Temp 98.1  F (36.7  C) (Temporal)   Resp 16   Wt 91.4 kg (201 lb 8 oz)   SpO2 94%   BMI 31.56 kg/m    Body mass index is 31.56 kg/m .  Physical Exam   GENERAL: healthy, alert and no distress  EYES: Eyes grossly normal to inspection, PERRL and conjunctivae and sclerae normal  HENT: ear canals and TM's normal.  Right nostril erythematous and swollen with a sore at the opening of the nare that has a secondary honey colored crust.  Left nostril normal.  No upper teeth.  Right gums erythematous and swollen, small ulceration noted along right gumline  NECK: no adenopathy, no asymmetry, masses, or scars and thyroid normal to palpation  RESP: lungs clear to auscultation - no rales, rhonchi or wheezes  CV: regular rate and rhythm, normal S1 S2, no S3 or S4, no murmur, click or rub, no " peripheral edema and peripheral pulses strong  SKIN: Right cheek, lower and upper eyelids, right side of mouth including upper lip are all erythematous and swollen.  NEURO: Normal strength and tone, mentation intact and speech normal  PSYCH: mentation appears normal, affect normal/bright    No results found for any visits on 03/05/21.    I spent a total of 15 minutes face-to-face with Andrew Alvarez during today's office visit.  Over 50% of this time was spent counseling the patient and/or coordinating care regarding impetigo, cellulitis.  See note for details.

## 2021-03-06 NOTE — ED PROVIDER NOTES
History     Chief Complaint   Patient presents with     Facial Swelling     HPI  Andrew Alvarez is a 70 year old male who presents with concerns of worsening infection of his face.  Patient was seen yesterday and diagnosed with a facial cellulitis.  Patient was discharged on Keflex and Bactroban ointment to the naris.  They come in because now they feel like it spread to the other side of the face.  There has been no fevers or chills noted.  Patient denies any nausea any vomiting and has been tolerating the medications okay.  Denies any increased pain.  Patient had a history of cellulitis as before and they were concerned because patient had to be admitted for a number of days because of that 1.  Patient has mild discomfort of his face and the pain is described as achy.    Allergies:  Allergies   Allergen Reactions     Codeine Rash       Problem List:    Patient Active Problem List    Diagnosis Date Noted     Insect bite of hand with infection 09/29/2019     Priority: Medium     Cellulitis of finger of left hand 09/27/2019     Priority: Medium     SHOLA (obstructive sleep apnea) 09/27/2019     Priority: Medium     Cigarette smoker 09/27/2019     Priority: Medium     Type 2 diabetes mellitus without complication, without long-term current use of insulin (H) 09/27/2019     Priority: Medium     Chronic hepatitis C without hepatic coma (H) 09/27/2019     Priority: Medium        Past Medical History:    Past Medical History:   Diagnosis Date     DM2 (diabetes mellitus, type 2) (H)      Hepatitis C carrier (H)      SHOLA on CPAP        Past Surgical History:    Past Surgical History:   Procedure Laterality Date     IRRIGATION AND DEBRIDEMENT UPPER EXTREMITY, COMBINED Left 9/29/2019    Procedure: Irrigationa and Debridement Left Second Finger Wound;  Surgeon: Jaime Jordan MD;  Location: [facility] OR     SHOULDER SURGERY  2000     TONSILLECTOMY & ADENOIDECTOMY  1990       Family History:    Family History   Problem  Relation Age of Onset     Diabetes Mother      Heart Disease Father      Cerebrovascular Disease Father         hx of stroke       Social History:  Marital Status:   [2]  Social History     Tobacco Use     Smoking status: Current Every Day Smoker     Packs/day: 0.25     Smokeless tobacco: Never Used   Substance Use Topics     Alcohol use: Not Currently     Drug use: None        Medications:    clindamycin (CLEOCIN) 300 MG capsule  aspirin 81 MG EC tablet  calcium carbonate (CALCIUM CARBONATE) 600 MG tablet  cetirizine (ZYRTEC) 5 MG tablet  ibuprofen (ADVIL/MOTRIN) 200 MG tablet  metFORMIN (GLUCOPHAGE) 500 MG tablet  metFORMIN (GLUCOPHAGE) 500 MG tablet  mupirocin (BACTROBAN) 2 % external ointment  vitamin (B COMPLEX) capsule  vitamin C (ASCORBIC ACID) 100 MG tablet          Review of Systems    Physical Exam   BP: (!) 146/85  Pulse: 82  Temp: 98.4  F (36.9  C)  Resp: 16  Weight: 90.3 kg (199 lb)  SpO2: 95 %      Physical Exam  Vitals signs and nursing note reviewed.   Constitutional:       General: He is not in acute distress.     Appearance: Normal appearance. He is not ill-appearing.   Skin:     Findings: Rash present.      Comments: Red, erythematous rash noted over the right cheek extending to the right nare area.  There is some mild edema noted, no obvious fluctuance or signs of an abscess.  Please see the pictures below.   Neurological:      Mental Status: He is alert.                 ED Course        Procedures      No results found for this or any previous visit (from the past 24 hour(s)).    Medications - No data to display     I am worried the infection is not get any better because this could possibly be secondary to MRSA.  I am going to have the patient stop his Keflex and we will change to a course of clindamycin.  Patient will continue the Bactroban ointments.  Patient will follow-up if there is no improvement over the next few days.    Assessments & Plan (with Medical Decision Making)  Facial  cellulitis     I have reviewed the nursing notes.    I have reviewed the findings, diagnosis, plan and need for follow up with the patient.      New Prescriptions    CLINDAMYCIN (CLEOCIN) 300 MG CAPSULE    Take 1 capsule (300 mg) by mouth 4 times daily for 10 days       Final diagnoses:   Facial cellulitis       3/6/2021   [facility] EMERGENCY DEPT     Nato Mackay MD  03/06/21 9513

## 2021-03-06 NOTE — DISCHARGE INSTRUCTIONS
1.  I am going to have you stop your Keflex and start taking this clindamycin 4 times a day.  It has the same coverage and should give better coverage for possible resistant bacteria.  2.  Please return if you do develop a fever anything over 101, if you see redness now spreading down into your neck area.

## 2021-03-06 NOTE — ED TRIAGE NOTES
Here with facial swelling and pain. He was seen in clinic yesterday and started on keflex and Bactroban. Today the swelling is worse.

## 2021-03-07 NOTE — TELEPHONE ENCOUNTER
[facility] Emergency Department/Urgent Care Lab result notification:    [place] ED lab result protocol used  Miscellaneous protocol    Reason for call  Notify of lab results, assess symptoms,  review ED providers recommendations/discharge instructions (if necessary) and advise per ED lab result f/u protocol    Lab Result   Final Methicillin Resist/Sens S. aureus PCR is Positive   Resulted after [facility] ED visit on this date 3/6/21.  Is using mupirocin (BACTROBAN) 2 % external ointment, Apply topically 3 times daily and taking Clindamycin (Cleocin) 300 mg PO capsule, 1 capsule (300 mg) by mouth 4 times daily for 10 days.  Information table from ED Provider visit on 3/6/21  Symptoms reported at ED visit (Chief complaint, HPI) Facial Swelling      HPI  Andrew Alvarez is a 70 year old male who presents with concerns of worsening infection of his face.  Patient was seen yesterday and diagnosed with a facial cellulitis.  Patient was discharged on Keflex and Bactroban ointment to the naris.  They come in because now they feel like it spread to the other side of the face.  There has been no fevers or chills noted.  Patient denies any nausea any vomiting and has been tolerating the medications okay.  Denies any increased pain.  Patient had a history of cellulitis as before and they were concerned because patient had to be admitted for a number of days because of that 1.  Patient has mild discomfort of his face and the pain is described as achy.     ED providers Impression and Plan (applicable information) I am worried the infection is not get any better because this could possibly be secondary to MRSA.  I am going to have the patient stop his Keflex and we will change to a course of clindamycin.  Patient will continue the Bactroban ointments.  Patient will follow-up if there is no improvement over the next few days.   Miscellaneous information Final diagnoses:   Facial cellulitis         RN Assessment (Patient s current Symptoms), include time called.  [Insert Left message here if message left]  12:41PM: Left voicemail message requesting a call back to [facility] ED Lab Result RN at [phone].  RN is available every day between 10 a.m. and 6:30 p.m.    [RN Name]  Jennifer Farias RN  [facility] RN  Lung Nodule and ED Lab Result RN  Epic pool (ED late result f/u RN): [id number]  [facility] INCIDENTAL RADIOLOGY F/U NURSES: P [id number]  Ph# [phone]      Copy of Lab result   Methicillin Resist/Sens S. aureus PCR  Order: [id number]  Status:  Final result   Visible to patient:  No (inaccessible in MyChart) Dx:  Facial cellulitis  Specimen Information: Nitish        (important suggestion)  Newer results are available. Click to view them now.     Ref Range & Units 1d ago Resulting Agency    Specimen Description  Nitish  [facility]    Methicillin Resist/Sens S. aureus PCR NEG^Negative PositiveAbnormal   [facility]   Comment: MRSA Positive: SA Positive  MRSA and Staphylococcus aureus target DNA   detected, presumed positive for MRSA and SA colonization. A positive test does    not necessarily indicate the presence of viable organisms. It is,however,   presumptive for the presence of MRSA or SA. FDA approved assay performed using    [employer] GeneXpert(R) real-time PCR.          Specimen Collected: 03/06/21  1:28 PM Last Resulted: 03/06/21  7:44 PM

## 2021-03-07 NOTE — TELEPHONE ENCOUNTER
[facility] Emergency Department/Urgent Care Lab result notification:     [place] ED lab result protocol used  Miscellaneous protocol     Reason for call  Notify of lab results, assess symptoms,  review ED providers recommendations/discharge instructions (if necessary) and advise per ED lab result f/u protocol     Lab Result   Final Methicillin Resist/Sens S. aureus PCR is Positive   Resulted after [facility] ED visit on this date 3/6/21.  Is using mupirocin (BACTROBAN) 2 % external ointment, Apply topically 3 times daily and taking Clindamycin (Cleocin) 300 mg PO capsule, 1 capsule (300 mg) by mouth 4 times daily for 10 days.    RN Assessment (Patient s current Symptoms), include time called.  [Insert Left message here if message left]  2:10PM:  Patient returned call. States that the swelling is alittle better below the right eye, the swelling in the left cheek is the same. Does not have any redness or swelling going into the neck. Had chills last night, none today. Does not have a thermometer to check a temperature, but they will get one today. Has been applying the Bactroban to the nares 3 times a day. Taking the clindamycin as directed, having some diarrhea, taking a probiotic.     RN Recommendations/Instructions per [place] ED lab result protocol  Patient notified of lab result and treatment recommendations.   RN reviewed information about MRSA from protocol Epic reference.   Advised to continue taking the antibiotic as prescribed by the ED and to continue using the Bactroban ointment 3 times a day for a full 10 days.   Advised to follow up with the PCP as directed by the ED provider.  Return to ED with any worsening symptoms including spreading redness and fever.  The patient is comfortable with the information given and has no further questions.   The patient was warm transferred to scheduling to make a clinic appointment.      Please Contact your PCP clinic or return to the  Emergency department if your:    Symptoms worsen or other concerning symptom's.    PCP follow-up Questions asked: YES       [RN Name]  Jennifer Farias RN  [facility] RN  Lung Nodule and ED Lab Result RN  Epic pool (ED late result f/u RN): P [id number]  [facility] INCIDENTAL RADIOLOGY F/U NURSES: P [id number]  # [phone]

## 2021-03-08 NOTE — PROGRESS NOTES
Assessment & Plan     Hospital discharge follow-up  - Improved but still with induration along the left upper lip and in the left buccal mucosa. He just started the Clindamycin on Saturday and is using the Bacitracin as directed in the Nostrils.   - He works on driving bus for vulnerable adults and with the Cellulitis secondary to MRSA, history of diabetes, and on going fatigue I am going to have him stay off work this week and follow up for her Wellness visit and get the cellulitis rechecked next week with Dr. Eckert his PCP  - Labs today.     Cellulitis of face  - As above.     Chronic hepatitis C without hepatic coma (H)  - Labs today.   - Comprehensive metabolic panel    Type 2 diabetes mellitus without complication, without long-term current use of insulin (H)  - Labs today, wellness visit next week.   - Hemoglobin A1c  - Albumin Random Urine Quantitative with Creat Ratio  - Comprehensive metabolic panel    Healthcare maintenance  - Labs today, wellness visit next week.   - Lipid panel reflex to direct LDL Fasting      30  minutes spent on the date of the encounter doing chart review, review of test results, interpretation of tests, patient visit and documentation            Return in about 1 week (around 3/15/2021), or Wellness visit - follow up cellulitis., for Physical Exam, Lab Work.    Isaac Ku NP  [facility] FRANCISCO Morales is a 70 year old who presents for the following health issues  accompanied by his spouse:    HPI       ED/UC Followup:    Facility:  [facility]   Date of visit: 03/06/2021  Reason for visit: Facial Cellulitis   Current Status: Improved.        He is doing better, some diarrhea with the antibiotic. Started the Clindamycin on Saturday so has 8 days left of the 10 day course. Still significant swelling along the left upper lip, he is really fatigued. No fevers for 3 days. Eating okay and drinking fluids. Unable to get his upper dentures in  "due to swelling which bothers him, concerned about getting back to work due to the MRSA and fatigue, drives bus for vulnerable adults.     Review of Systems   Constitutional, HEENT, cardiovascular, pulmonary, gi and gu systems are negative, except as otherwise noted.      Objective    /84   Pulse 85   Temp 97.3  F (36.3  C) (Temporal)   Resp 20   Ht 1.676 m (5' 6\")   Wt 87.7 kg (193 lb 4 oz)   SpO2 98%   BMI 31.19 kg/m    Body mass index is 31.19 kg/m .  Physical Exam  Vitals signs reviewed.   Constitutional:       Appearance: He is ill-appearing.   HENT:      Head: Normocephalic and atraumatic.     Neck:      Musculoskeletal: Normal range of motion.   Cardiovascular:      Rate and Rhythm: Normal rate and regular rhythm.      Pulses: Normal pulses.   Pulmonary:      Effort: Pulmonary effort is normal.      Breath sounds: Normal breath sounds.   Abdominal:      Palpations: Abdomen is soft.   Musculoskeletal: Normal range of motion.   Skin:     General: Skin is warm and dry.      Findings: Erythema present.   Neurological:      General: No focal deficit present.      Mental Status: He is alert and oriented to person, place, and time.   Psychiatric:         Mood and Affect: Mood normal.         Thought Content: Thought content normal.         Judgment: Judgment normal.            Labs reviewed in EPIC            "

## 2021-03-08 NOTE — LETTER
[facility] [address] [address]  [phone]  Dept: [phone]      3/8/2021    Re: Andrew CASTAÑEDA Kim      TO WHOM IT MAY CONCERN:    Andrew Alvarez  was seen on 3/8/21.  Please excuse him  Until 3/15/21  due to illness.    Cordalistair Ku NP  [facility]

## 2021-03-08 NOTE — PATIENT INSTRUCTIONS
- Continue with antibiotic, take with food, take a probiotic daily to include yogurt.     - Warm and Cold pack, alternate.     - Okay to use Tylenol 3 times daily. Can use Ibuprofen 2 times daily for pain and inflammation.     - Stay off work until Friday, gets lots of rest and fluids.     - I will set you up for your physical next week with Dr. Eckert and then he can make sure the infection continues to improve.     - If the facial swelling gets worse or you start to run a fever over 100.4 call clinic, or back to ER.     Isaac Ku CNP

## 2021-03-08 NOTE — LETTER
March 8, 2021      Andrew Alvarez  [address] [address]        Dear ,    We are writing to inform you of your test results.    he fasting glucose, the A1c, and urine Albumin are trending up indicating the diabetes is a bit worse. He can discuss this next week with Dr. Eckert to determine if there needs to be a change in plan of care. Cholesterol is mildly elevated but you can work with Dr. Eckert to determine if he feels you need a medication and or some diet changes to manage this. For now focus on resting so we can get the infection in your face resolved.     Call clinic with questions or concerns.     Thank you,     Isaac Ku CNP     Resulted Orders   Hemoglobin A1c   Result Value Ref Range    Hemoglobin A1C 7.1 (H) 0 - 5.6 %      Comment:      Normal <5.7% Prediabetes 5.7-6.4%  Diabetes 6.5% or higher - adopted from ADA   consensus guidelines.     Lipid panel reflex to direct LDL Fasting   Result Value Ref Range    Cholesterol 206 (H) <200 mg/dL      Comment:      Desirable:       <200 mg/dl    Triglycerides 285 (H) <150 mg/dL      Comment:      Borderline high:  150-199 mg/dl  High:             200-499 mg/dl  Very high:       >499 mg/dl  Fasting specimen      HDL Cholesterol 38 (L) >39 mg/dL    LDL Cholesterol Calculated 111 (H) <100 mg/dL      Comment:      Above desirable:  100-129 mg/dl  Borderline High:  130-159 mg/dL  High:             160-189 mg/dL  Very high:       >189 mg/dl      Non HDL Cholesterol 168 (H) <130 mg/dL      Comment:      Above Desirable:  130-159 mg/dl  Borderline high:  160-189 mg/dl  High:             190-219 mg/dl  Very high:       >219 mg/dl     Albumin Random Urine Quantitative with Creat Ratio   Result Value Ref Range    Creatinine Urine 134 mg/dL    Albumin Urine mg/L 49 mg/L    Albumin Urine mg/g Cr 36.34 (H) 0 - 17 mg/g Cr   Comprehensive metabolic panel   Result Value Ref Range    Sodium 138 133 - 144 mmol/L    Potassium 4.1 3.4 - 5.3 mmol/L     Chloride 106 94 - 109 mmol/L    Carbon Dioxide 27 20 - 32 mmol/L    Anion Gap 5 3 - 14 mmol/L    Glucose 180 (H) 70 - 99 mg/dL      Comment:      Fasting specimen    Urea Nitrogen 15 7 - 30 mg/dL    Creatinine 0.86 0.66 - 1.25 mg/dL    GFR Estimate 88 >60 mL/min/[1.73_m2]      Comment:      Non  GFR Calc  Starting 12/18/2018, serum creatinine based estimated GFR (eGFR) will be   calculated using the Chronic Kidney Disease Epidemiology Collaboration   (CKD-EPI) equation.      GFR Estimate If Black >90 >60 mL/min/[1.73_m2]      Comment:       GFR Calc  Starting 12/18/2018, serum creatinine based estimated GFR (eGFR) will be   calculated using the Chronic Kidney Disease Epidemiology Collaboration   (CKD-EPI) equation.      Calcium 9.0 8.5 - 10.1 mg/dL    Bilirubin Total 0.3 0.2 - 1.3 mg/dL    Albumin 3.6 3.4 - 5.0 g/dL    Protein Total 7.8 6.8 - 8.8 g/dL    Alkaline Phosphatase 66 40 - 150 U/L    ALT 21 0 - 70 U/L    AST 6 0 - 45 U/L       If you have any questions or concerns, please call the clinic at the number listed above.       Sincerely,

## 2021-03-10 NOTE — RESULT ENCOUNTER NOTE
Appt f/u in clinic on 3/8/21 and has appt on 3/17/21 with Dr Eckert  Bacterial growth is susceptible to Clindamycin.

## 2021-03-17 NOTE — LETTER
[address] [address]  Phone: [phone]    March 17, 2021        Andrew Alvarez  [address] [facility] [address]          To whom it may concern:    RE: Andrew Alvarez    Patient was seen and treated today at our clinic and missed work.  He should be off work until next week's culture is back and negative.  Off work until the 24th of March    Please contact me for questions or concerns.      Sincerely,        Kentrell Eckert MD

## 2021-03-17 NOTE — PROGRESS NOTES
"SUBJECTIVE:   Andrew Alvarez is a 70 year old male who presents for Preventive Visit.      Patient has been advised of split billing requirements and indicates understanding: Yes   Are you in the first 12 months of your Medicare coverage?  No    Healthy Habits:     In general, how would you rate your overall health?  Good    Frequency of exercise:  2-3 days/week    Duration of exercise:  Less than 15 minutes    Do you usually eat at least 4 servings of fruit and vegetables a day, include whole grains    & fiber and avoid regularly eating high fat or \"junk\" foods?  No    Taking medications regularly:  Yes    Medication side effects:  None    Ability to successfully perform activities of daily living:  No assistance needed    Home Safety:  No safety concerns identified    Hearing Impairment:  Difficulty understanding soft or whispered speech    In the past 6 months, have you been bothered by leaking of urine?  No    In general, how would you rate your overall mental or emotional health?  Excellent      PHQ-2 Total Score: 0    Additional concerns today:  No    Right facial cellulititis and failed keflex, then ER gave clindamycin.  Bactroban ointment.  Better but still some swelling.   Nares was MRSA positive.   Having diarrhea from the Clindamycin      a1c is 7.1, more then usual. Not eating as well. Metformin is 1000mg at bedtime and 500mg am.     SHOLA and has CPAP everynight.  Getting stuff at [facility] in [place].     Do you feel safe in your environment? Yes    Have you ever done Advance Care Planning? (For example, a Health Directive, POLST, or a discussion with a medical provider or your loved ones about your wishes): Yes, patient states has an Advance Care Planning document and will bring a copy to the clinic.    Fall risk  Fallen 2 or more times in the past year?: No  Any fall with injury in the past year?: No    Cognitive Screening   1) Repeat 3 items (Leader, Season, Table)    2) Clock draw: NORMAL  3) 3 " item recall: Recalls 3 objects  Results: 3 items recalled: COGNITIVE IMPAIRMENT LESS LIKELY    Mini-CogTM Copyright S Felecia. Licensed by the author for use in [facility]; reprinted with permission (harish@.[email or web address]). All rights reserved.      Do you have sleep apnea, excessive snoring or daytime drowsiness?: yes    Reviewed and updated as needed this visit by clinical staff  Tobacco  Allergies  Meds  Problems  Med Hx  Surg Hx  Fam Hx          Reviewed and updated as needed this visit by Provider                Social History     Tobacco Use     Smoking status: Current Every Day Smoker     Packs/day: 0.25     Smokeless tobacco: Never Used   Substance Use Topics     Alcohol use: Not Currently     If you drink alcohol do you typically have >3 drinks per day or >7 drinks per week? No    Alcohol Use 3/17/2021   Prescreen: >3 drinks/day or >7 drinks/week? Not Applicable     Current providers sharing in care for this patient include:   Patient Care Team:  Kentrell Eckert MD as PCP - General (Internal Medicine)  Kentrell Eckert MD as Assigned PCP  Jaime Jordan MD as Assigned Musculoskeletal Provider    The following health maintenance items are reviewed in Epic and correct as of today:  Health Maintenance   Topic Date Due     DIABETIC FOOT EXAM  Never done     ADVANCE CARE PLANNING  Never done     EYE EXAM  Never done     COLORECTAL CANCER SCREENING  Never done     COVID-19 Vaccine (1 of 2) Never done     ZOSTER IMMUNIZATION (2 of 3) 12/19/2014     MEDICARE ANNUAL WELLNESS VISIT  Never done     AORTIC ANEURYSM SCREENING (SYSTEM ASSIGNED)  Never done     INFLUENZA VACCINE (1) 09/01/2020     A1C  06/08/2021     FALL RISK ASSESSMENT  03/05/2022     BMP  03/08/2022     LIPID  03/08/2022     MICROALBUMIN  03/08/2022     DTAP/TDAP/TD IMMUNIZATION (3 - Td) 08/01/2024     PHQ-2  Completed     Pneumococcal Vaccine: Pediatrics (0 to 5 Years) and At-Risk Patients (6 to 64 Years)  Completed     Pneumococcal  "Vaccine: 65+ Years  Completed     IPV IMMUNIZATION  Aged Out     MENINGITIS IMMUNIZATION  Aged Out     Labs reviewed in EPIC      Review of Systems   Constitutional: Negative for chills.   HENT: Negative for congestion.    Respiratory: Negative for cough.    Cardiovascular: Negative for chest pain.   Gastrointestinal: Negative for abdominal pain, constipation, diarrhea and hematochezia.   Genitourinary: Negative for hematuria.       OBJECTIVE:   BP (!) 156/92   Pulse 92   Temp 97.9  F (36.6  C) (Temporal)   Resp 16   Wt 88.5 kg (195 lb)   SpO2 96%   BMI 31.47 kg/m   Estimated body mass index is 31.47 kg/m  as calculated from the following:    Height as of 3/8/21: 1.676 m (5' 6\").    Weight as of this encounter: 88.5 kg (195 lb).  Physical Exam  GENERAL: healthy, alert and no distress  EYES: Eyes grossly normal to inspection, PERRL and conjunctivae and sclerae normal  HENT: ear canals and TM's normal, nose and mouth without ulcers or lesions  NECK: no adenopathy, no asymmetry, masses, or scars and thyroid normal to palpation  RESP: lungs clear to auscultation - no rales, rhonchi or wheezes  CV: regular rate and rhythm, normal S1 S2, no S3 or S4, no murmur, click or rub, no peripheral edema and peripheral pulses strong  ABDOMEN: soft, nontender, no hepatosplenomegaly, no masses and bowel sounds normal  MS: no gross musculoskeletal defects noted, no edema  NEURO: Normal strength and tone, mentation intact and speech normal  PSYCH: mentation appears normal, affect normal/bright  Right side of face still has some erythema and swelling in the cheek, erythema of the right nares.        ASSESSMENT / PLAN:       ICD-10-CM    1. Type 2 diabetes mellitus without complication, without long-term current use of insulin (H)  E11.9    2. Facial cellulitis  L03.211 clindamycin (CLEOCIN) 300 MG capsule     OFFICE/OUTPT VISIT,EST,LEVL III     DISCONTINUED: clindamycin (CLEOCIN) 300 MG capsule   3. Essential hypertension  I10 " "losartan (COZAAR) 50 MG tablet     **Basic metabolic panel FUTURE 14d   4. Cigarette smoker  F17.210    5. SHOLA (obstructive sleep apnea)  G47.33 SLEEP EVALUATION & MANAGEMENT REFERRAL - [facility] [phone] (Age 13 and up if over 100 lbs)   6. 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  Hyperlipidemia with diabetes we will start him on low-dose statin atorvastatin 20 mg a day  Blood pressure is elevated and he has some microalbuminuria.  We will start him on the losartan 50 mg a day and recheck a blood count next week.    Facial cellulitis on the right side with MRSA positive was in the urgent care and then emergency room.  Tried Keflex but was not better got better on Bactroban ointment and clindamycin.  Due to continued redness will extend the clindamycin.  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      Patient has been advised of split billing requirements and indicates understanding: Yes  COUNSELING:  Reviewed preventive health counseling, as reflected in patient instructions       Regular exercise       Healthy diet/nutrition       Immunizations    Recommended he get the Covid shot.  The patient is resistant to this due to it not being tested.  Still recommended with his age the benefits outweigh the risk he will wait on this.        Estimated body mass index is 31.47 kg/m  as calculated from the following:    Height as of 3/8/21: 1.676 m (5' 6\").    Weight as of this encounter: 88.5 kg (195 lb).    Weight management plan: Discussed healthy diet and exercise guidelines    He reports that he has been smoking. He has been smoking about 0.25 packs per day. He has never used smokeless tobacco.  Tobacco Cessation Action Plan:   Information offered: Patient not " interested at this time congratulated on cutting down smoking recommended he quit completely as his lungs are coarse today.      Appropriate preventive services were discussed with this patient, including applicable screening as appropriate for cardiovascular disease, diabetes, osteopenia/osteoporosis, and glaucoma.  As appropriate for age/gender, discussed screening for colorectal cancer, prostate cancer, breast cancer, and cervical cancer. Checklist reviewing preventive services available has been given to the patient.    Reviewed patients plan of care and provided an AVS. The Basic Care Plan (routine screening as documented in Health Maintenance) for Andrew meets the Care Plan requirement. This Care Plan has been established and reviewed with the Patient.    Counseling Resources:  ATP IV Guidelines  Pooled Cohorts Equation Calculator  Breast Cancer Risk Calculator  Breast Cancer: Medication to Reduce Risk  FRAX Risk Assessment  ICSI Preventive Guidelines  Dietary Guidelines for Americans, 2010  USDA's MyPlate  ASA Prophylaxis  Lung CA Screening    Kentrell Eckert MD  [facility]    Identified Health Risks:

## 2021-03-23 NOTE — TELEPHONE ENCOUNTER
Patient is aware he has to wait until he gets results.  Please call him when you find out and he said ok to leave a detailed message.

## 2021-03-23 NOTE — TELEPHONE ENCOUNTER
Patient was in today for his MRSA swab. He states he needs a note for work, because he can't go back until he has the results, and they were expecting him back yesterday. Please review, and if appropriate, write note. Please let Andrew know when this is complete.

## 2021-03-23 NOTE — PROGRESS NOTES
Patient was in for a MRSA swab per Dr. Eckert. He needs to know the results from this so he can return to work.

## 2021-03-24 NOTE — LETTER
[address]   [address]  Tel. [phone] / Fax [phone]    3/24/2021    [facility]  [address] [address]  [phone] (home)     :     1950          To Whom it May Concern:    This patient missed work 3/24/2021 due to illness or injury.  This patient was seen in our clinic today (3/24/2021) and missed work.  Patient can return to work on 3/25/21 without restrictions.      Please contact me for questions or concerns.    Sincerely,        Kentrell Eckert MD

## 2021-03-24 NOTE — TELEPHONE ENCOUNTER
I called patient about his results and he asked if he can go back to work now.     Yamini Snell MA 3/24/2021

## 2021-04-19 NOTE — PROGRESS NOTES
Does Andrew have a CPAP/Bipap?  Yes     Type of mask: nasal     [facility] [phone]   [email or web address]  [facility] [phone]     Jenera Sleep Scale: 8    Andrew is a 70 year old who is being evaluated via a billable telephone visit.      What phone number would you like to be contacted at? [phone]  How would you like to obtain your AVS? Mail a copy  Subjective   Andrew is a 70 year old who presents for the following health issues SHOLA  Vitals:  No vitals were obtained today due to virtual visit.  Phone call duration: 15 minutes    Obstructive Sleep Apnea - PAP Follow-Up Visit:    Chief Complaint   Patient presents with     Sleep Problem       Andrew Alvarez comes in today for follow-up of their mild sleep apnea, managed with CPAP.     No specialty comments available.    Overall, he rates the experience with PAP as 10 (0 poor, 10 great). The mask is comfortable.   The mask is not leaking .  He is not snoring with the mask on. He is not having gasp arousals.  He is not having significant oral/nasal dryness. The pressure is comfortable.     His PAP interface is Nasal Pillows.    Bedtime is typically 10. Usually it takes about 5 minutes to fall asleep with the mask on. Wake time is typically 5.  Patient is using PAP therapy 7-8 hours per night. The patient is usually getting 7-8 hours of sleep per night.    He does feel rested in the morning.    Jenera Sleepiness Scale: 8/24      No data recorded        Respironics    Auto-PAP 7 - 11 cmH2O 30 day usage data:    100% of days with > 4 hours of use. 0/30 days with no use.   Average use 7 hours 14 minutes per day.   Average leak 9.6 LPM.  Average % of night in large leak 1%.    CPAP 90% pressure 8.8cm.   AHI 1.9 events per hour.          Past medical/surgical history, family history, social history, medications and allergies were reviewed.      Problem List:  Patient Active Problem List     Diagnosis Date Noted     Insect bite of hand with infection 09/29/2019     Priority: Medium     Cellulitis of finger of left hand 09/27/2019     Priority: Medium     SHOLA (obstructive sleep apnea) 09/27/2019     Priority: Medium     Cigarette smoker 09/27/2019     Priority: Medium     Type 2 diabetes mellitus without complication, without long-term current use of insulin (H) 09/27/2019     Priority: Medium     Chronic hepatitis C without hepatic coma (H) 09/27/2019     Priority: Medium        There were no vitals taken for this visit.    Impression/Plan:    Mild Sleep apnea. He is Tolerating PAP well, moved into this area and establishing care. [facility]ring [facility]. APneas are well controlled on current settings. Daytime symptoms are stable.       Andrew Alvarez will follow up in about 1 year(s).     Fifteen minutes spent with patient, all of which were spent face-to-face counseling, consulting, coordinating plan of care.      CC:  Kentrell Eckert,

## 2021-09-03 NOTE — PATIENT INSTRUCTIONS

## 2021-09-03 NOTE — PROGRESS NOTES
Assessment & Plan     Type 2 diabetes mellitus without complication, without long-term current use of insulin (H)  Patient has type 2 diabetes.  Has been on Metformin 500 in the morning and thousand at night.  He is sugars are going higher.  Therefore we are going to add Jardiance 10 mg a day if possible.  If this is not covered by insurance will then go to an alternative like Farxiga or Ozempic.  We will check his A1c today.  - empagliflozin (JARDIANCE) 10 MG TABS tablet; Take 1 tablet (10 mg) by mouth daily  - Hemoglobin A1c; Future    Cigarette smoker  Cigarette smoker recommended he quit smoking.  He is not interested in this.  We did tobacco counseling.  He also has greater than 80 pack years and we will get him set up for a lung cancer screening  - Prof fee: Shared Decisionmaking for Lung Cancer Screening  - CT Chest Lung Cancer Scrn Low Dose wo; Future    Personal history of tobacco use  Interested in smoking cessation                   Return in about 6 months (around 3/3/2022) for Routine Visit.    Kentrell Eckert MD  [facility] [place] [facility] FRANCISCO Morales is a 70 year old who presents for the following health issues     HPI     Chief Complaint   Patient presents with     Diabetes     F/u     Bought a new [facility] on [facility].      Feeling good. Weight is down 7 pounds.  Eating better.     Metformin 500mg AM and 1000mg at night.    Checks daily in the AM, today 176, seems higher to him, 150.     Past Medical History:   Diagnosis Date     DM2 (diabetes mellitus, type 2) (H)      Hepatitis C carrier (H)      SHOLA on CPAP      Current Outpatient Medications   Medication     ACCU-CHEK JARON PLUS test strip     aspirin 81 MG EC tablet     atorvastatin (LIPITOR) 20 MG tablet     Blood Glucose Calibration (MEDISENSE GLUCOSE KETONE CONTR) LIQD     calcium carbonate (CALCIUM CARBONATE) 600 MG tablet     cetirizine (ZYRTEC) 5 MG tablet     empagliflozin (JARDIANCE) 10 MG TABS tablet     losartan  (COZAAR) 50 MG tablet     metFORMIN (GLUCOPHAGE) 500 MG tablet     metFORMIN (GLUCOPHAGE) 500 MG tablet     Respiratory Therapy Supplies (CARETOUCH 2 CPAP HOSE ) MISC     vitamin (B COMPLEX) capsule     vitamin C (ASCORBIC ACID) 100 MG tablet     Zinc Sulfate (ZINC 15 PO)     ibuprofen (ADVIL/MOTRIN) 200 MG tablet     No current facility-administered medications for this visit.     Social History     Tobacco Use     Smoking status: Current Every Day Smoker     Packs/day: 0.25     Smokeless tobacco: Never Used   Substance Use Topics     Alcohol use: Not Currently     Drug use: None       Review of Systems         Objective    /68   Pulse 82   Temp 97.3  F (36.3  C) (Temporal)   Resp 16   Wt 85.3 kg (188 lb)   SpO2 95%   BMI 30.34 kg/m    Body mass index is 30.34 kg/m .  Physical Exam   GENERAL: healthy, alert and no distress  RESP: lungs clear to auscultation - no rales, rhonchi or wheezes  CV: regular rate and rhythm, normal S1 S2, no S3 or S4, no murmur, click or rub, no peripheral edema and peripheral pulses strong  MS: no gross musculoskeletal defects noted, no edema  SKIN: no suspicious lesions or rashes  NEURO: Normal strength and tone, mentation intact and speech normal  PSYCH: mentation appears normal, affect normal/bright                Lung Cancer Screening Shared Decision Making Visit     Andrew Alvarez is eligible for lung cancer screening on the basis of the information provided in my signed lung cancer screening order.     I have discussed with patient the risks and benefits of screening for lung cancer with low-dose CT.     The risks include:  radiation exposure: one low dose chest CT has as much ionizing radiation as about 15 chest x-rays or 6 months of background radiation living in [place]    false positives: 96% of positive findings/nodules are NOT cancer, but some might still require additional diagnostic evaluation, including biopsy  over-diagnosis: some slow growing  cancers that might never have been clinically significant will be detected and treated unnecessarily     The benefit of early detection of lung cancer is contingent upon adherence to annual screening or more frequent follow up if indicated.     Furthermore, reaping the benefits of screening requires Andrew Alvarez to be willing and physically able to undergo diagnostic procedures, if indicated. Although no specific guide is available for determining severity of comorbidities, it is reasonable to withhold screening in patients who have greater mortality risk from other diseases.     We did discuss that the only way to prevent lung cancer is to not smoke. Smoking cessation counseling was given, duration 3-10 minutes.      I did not offer risk estimation using a calculator such as this one:    ShouldIScreen

## 2021-09-04 NOTE — TELEPHONE ENCOUNTER
Reason for call:  Other   Patient called regarding (reason for call): call back  Additional comments: Patient is calling because he went to  his jardiance medication, but it is too costly for the patient and he would like to try something else. Please call patient back.     [facility]  [address] [place] [address]   [phone]    Phone number to reach patient:  Home number on file [phone] (home)    Best Time:  Any time    Can we leave a detailed message on this number?  YES    Travel screening: Not Applicable

## 2021-09-08 NOTE — TELEPHONE ENCOUNTER
Refill request from [facility]. RN called and verify dose of medication. Patient said he is moving and it would just be easier to get his RXs sent to [facility] for now.     ALIVIA Tate, RN  [facility]

## 2021-09-10 NOTE — TELEPHONE ENCOUNTER
Routing refill request to provider for review/approval because:  Medication is reported/historical    metFORMIN (GLUCOPHAGE) 500 MG tablet 360 tablet 0    Sig: Take 500 mg by mouth daily (with breakfast) and 1,000 mg at bedtime     Jacqui Farnsworth RN

## 2021-09-10 NOTE — TELEPHONE ENCOUNTER
"Requested Prescriptions   Pending Prescriptions Disp Refills     losartan (COZAAR) 50 MG tablet 90 tablet 3     Sig: Take 1 tablet (50 mg) by mouth daily       Angiotensin-II Receptors Passed - 9/8/2021 11:09 AM        Passed - Last blood pressure under 140/90 in past 12 months     BP Readings from Last 3 Encounters:   09/03/21 126/68   03/17/21 138/68   03/08/21 130/84                 Passed - Recent (12 mo) or future (30 days) visit within the authorizing provider's specialty     Patient has had an office visit with the authorizing provider or a provider within the authorizing providers department within the previous 12 mos or has a future within next 30 days. See \"Patient Info\" tab in inbasket, or \"Choose Columns\" in Meds & Orders section of the refill encounter.              Passed - Medication is active on med list        Passed - Patient is age 18 or older        Passed - Normal serum creatinine on file in past 12 months     Recent Labs   Lab Test 03/23/21  1206   CR 0.72       Ok to refill medication if creatinine is low          Passed - Normal serum potassium on file in past 12 months     Recent Labs   Lab Test 03/23/21  1206   POTASSIUM 3.9                       atorvastatin (LIPITOR) 20 MG tablet 90 tablet 3     Sig: Take 1 tablet (20 mg) by mouth daily       Statins Protocol Passed - 9/8/2021 11:09 AM        Passed - LDL on file in past 12 months     Recent Labs   Lab Test 03/08/21  0755   *             Passed - No abnormal creatine kinase in past 12 months     No lab results found.             Passed - Recent (12 mo) or future (30 days) visit within the authorizing provider's specialty     Patient has had an office visit with the authorizing provider or a provider within the authorizing providers department within the previous 12 mos or has a future within next 30 days. See \"Patient Info\" tab in inbasket, or \"Choose Columns\" in Meds & Orders section of the refill encounter.              Passed - " "Medication is active on med list        Passed - Patient is age 18 or older           metFORMIN (GLUCOPHAGE) 500 MG tablet 360 tablet 0     Sig: Take 500 mg by mouth daily (with breakfast) and 1,000 mg at bedtime       Biguanide Agents Passed - 9/8/2021 11:09 AM        Passed - Patient is age 10 or older        Passed - Patient has documented A1c within the specified period of time.     If HgbA1C is 8 or greater, it needs to be on file within the past 3 months.  If less than 8, must be on file within the past 6 months.     Recent Labs   Lab Test 09/03/21  0848   A1C 7.6*             Passed - Patient's CR is NOT>1.4 OR Patient's EGFR is NOT<45 within past 12 mos.     Recent Labs   Lab Test 03/23/21  1206   GFRESTIMATED >90   GFRESTBLACK >90       Recent Labs   Lab Test 03/23/21  1206   CR 0.72             Passed - Patient does NOT have a diagnosis of CHF.        Passed - Medication is active on med list        Passed - Recent (6 mo) or future (30 days) visit within the authorizing provider's specialty     Patient had office visit in the last 6 months or has a visit in the next 30 days with authorizing provider or within the authorizing provider's specialty.  See \"Patient Info\" tab in inbasket, or \"Choose Columns\" in Meds & Orders section of the refill encounter.               Current refills sent to pharmacy change requested.  Jacqui Farnsworth RN    "

## 2021-09-15 NOTE — TELEPHONE ENCOUNTER
Reason for Call:  Medication or medication refill:    Do you use a [facility]?  Name of the pharmacy and phone number for the current request:  [facility] [place] - [phone]    Name of the medication requested: accucheck test strips    Other request: is out        Can we leave a detailed message on this number? YES    Phone number patient can be reached at: Home number on file [phone] (home)    Best Time: anytime    Call taken on 9/15/2021 at 5:36 PM by Talisha Santiago

## 2021-09-15 NOTE — TELEPHONE ENCOUNTER
Reason for Call:  Other call back    Detailed comments: patient needs his accucheck he is out of them and they have no prescription    Phone Number Patient can be reached at: Home number on file [phone] (home)    Best Time: anytimne    Can we leave a detailed message on this number? YES    Call taken on 9/15/2021 at 5:04 PM by Makayla Martinez

## 2021-09-16 NOTE — TELEPHONE ENCOUNTER
accu-check test strips  Routing refill request to provider for review/approval because:  Medication is reported/historical    Shaye Nino RN

## 2021-11-29 NOTE — TELEPHONE ENCOUNTER
"Patient has not lost sense of taste or smell.    Patient has had diarrhea 4-5 times daily since thanksgiving.    He also has a cough. He is not wheezing.     Patient advised to be seen.  Appointment made.  Kelly Gerardo RN on 11/29/2021 at 3:47 PM      Reason for Disposition    MODERATE diarrhea (e.g., 4-6 times / day more than normal) and present > 48 hours (2 days)    Additional Information    Negative: Shock suspected (e.g., cold/pale/clammy skin, too weak to stand, low BP, rapid pulse)    Negative: Difficult to awaken or acting confused (e.g., disoriented, slurred speech)    Negative: Sounds like a life-threatening emergency to the triager    Negative: Vomiting also present and worse than the diarrhea    Negative: Blood in stool and without diarrhea    Negative: SEVERE abdominal pain (e.g., excruciating) and present > 1 hour    Negative: SEVERE abdominal pain and age > 60    Negative: Bloody, black, or tarry bowel movements (Exception: chronic-unchanged black-grey bowel movements and is taking iron pills or Pepto-bismol)    Negative: SEVERE diarrhea (e.g., 7 or more times / day more than normal) and age > 60 years    Negative: Constant abdominal pain lasting > 2 hours    Negative: Drinking very little and has signs of dehydration (e.g., no urine > 12 hours, very dry mouth, very lightheaded)    Negative: Patient sounds very sick or weak to the triager    Negative: SEVERE diarrhea (e.g., 7 or more times / day more than normal) and present > 24 hours (1 day)    Answer Assessment - Initial Assessment Questions  1. DIARRHEA SEVERITY: \"How bad is the diarrhea?\" \"How many extra stools have you had in the past 24 hours than normal?\"     - NO DIARRHEA (SCALE 0)    - MILD (SCALE 1-3): Few loose or mushy BMs; increase of 1-3 stools over normal daily number of stools; mild increase in ostomy output.    -  MODERATE (SCALE 4-7): Increase of 4-6 stools daily over normal; moderate increase in ostomy output.  * SEVERE (SCALE " "8-10; OR 'WORST POSSIBLE'): Increase of 7 or more stools daily over normal; moderate increase in ostomy output; incontinence.      5 times  2. ONSET: \"When did the diarrhea begin?\"       thanksgiving  3. BM CONSISTENCY: \"How loose or watery is the diarrhea?\"       watery  4. VOMITING: \"Are you also vomiting?\" If so, ask: \"How many times in the past 24 hours?\"       no  5. ABDOMINAL PAIN: \"Are you having any abdominal pain?\" If yes: \"What does it feel like?\" (e.g., crampy, dull, intermittent, constant)       none  6. ABDOMINAL PAIN SEVERITY: If present, ask: \"How bad is the pain?\"  (e.g., Scale 1-10; mild, moderate, or severe)    - MILD (1-3): doesn't interfere with normal activities, abdomen soft and not tender to touch     - MODERATE (4-7): interferes with normal activities or awakens from sleep, tender to touch     - SEVERE (8-10): excruciating pain, doubled over, unable to do any normal activities        none  7. ORAL INTAKE: If vomiting, \"Have you been able to drink liquids?\" \"How much fluids have you had in the past 24 hours?\"      Drinking well  8. HYDRATION: \"Any signs of dehydration?\" (e.g., dry mouth [not just dry lips], too weak to stand, dizziness, new weight loss) \"When did you last urinate?\"      Dizziness, dry lips. Patient urinated 15 ago  9. EXPOSURE: \"Have you traveled to a foreign country recently?\" \"Have you been exposed to anyone with diarrhea?\" \"Could you have eaten any food that was spoiled?\"      none  10. ANTIBIOTIC USE: \"Are you taking antibiotics now or have you taken antibiotics in the past 2 months?\"        none  11. OTHER SYMPTOMS: \"Do you have any other symptoms?\" (e.g., fever, blood in stool)        none  12. PREGNANCY: \"Is there any chance you are pregnant?\" \"When was your last menstrual period?\"        none    Protocols used: DIARRHEA-A-OH      "

## 2021-11-29 NOTE — TELEPHONE ENCOUNTER
From mychart:  I have not felt good for the past several days with body aches, cough with some mucus, no fever, occasional diarrhea, & tiredness

## 2021-11-30 NOTE — PROGRESS NOTES
"Andrew is a 70 year old who is being evaluated via a billable telephone visit.      What phone number would you like to be contacted at? [phone]  How would you like to obtain your AVS? MyChart    Assessment & Plan     Cough  Patient has had 5 days of cough, diarrhea, body aches he still has his taste and smell but he most likely has COVID-19 it is rampant in his area, he works as a  his wife works in the school she is sick as well they have not been vaccinated.  At this point I think he can stay at home I did recommend he get a Covid test and have ordered this.  Depending on that result would recommend he have dexamethasone and possibly monoclonal antibody therapy.  - Symptomatic COVID-19 Virus (Coronavirus) by PCR; Future    Diarrhea, unspecified type  Diarrhea which can go with Covid, he had antibiotics last summer and had some post antibiotic diarrhea but that is resolved for many months and then it started out again.  Would like to avoid antibiotics as it can cause more diarrhea.  - Symptomatic COVID-19 Virus (Coronavirus) by PCR; Future             BMI:   Estimated body mass index is 30.34 kg/m  as calculated from the following:    Height as of 3/8/21: 1.676 m (5' 6\").    Weight as of 9/3/21: 85.3 kg (188 lb).           No follow-ups on file.    Kentrell Eckert MD  [facility]    Jordan Morales is a 70 year old who presents for the following health issues     HPI     Acute Illness  Acute illness concerns: cough  Onset/Duration: 5 days  Symptoms:  Fever: no  Chills/Sweats: no  Headache (location?): no  Sinus Pressure: no  Conjunctivitis:  no  Ear Pain: no  Rhinorrhea: YES  Congestion: no  Sore Throat: no  Cough: YES-non-productive  Wheeze: no  Decreased Appetite: YES, can smell and taste  Nausea: no  Vomiting: no  Diarrhea: YES  Dysuria/Freq.: no  Dysuria or Hematuria: no  Fatigue/Achiness: YES  Sick/Strep Exposure: no  Therapies tried and outcome: Musinex- some relief "     5 days of being sick, no fevers, body aches. Cough, dry. Diarrhea.   Wife is sick as well.                Review of Systems         Objective           Vitals:  No vitals were obtained today due to virtual visit.    Physical Exam   healthy, alert and no distress  PSYCH: Alert and oriented times 3; coherent speech, normal   rate and volume, able to articulate logical thoughts, able   to abstract reason, no tangential thoughts, no hallucinations   or delusions  His affect is normal  RESP:cough during exam  Remainder of exam unable to be completed due to telephone visits                Phone call duration: 11 minutes

## 2021-12-01 NOTE — ED PROVIDER NOTES
History     Chief Complaint   Patient presents with     Covid Concern     HPI  Andrew Alvarez is a 70 year old male who presents with complaints of congestion and nonproductive cough since Thanksgiving.  Wife has similar symptoms.  Associated fever and body aches.  Exposed to Covid.  He has not received Covid vaccines.  Does have risk factors including type 2 diabetes, sleep apnea, and has a 60+ pack year smoking history.  States he is never been diagnosed with COPD.  Has had a history of pneumonia.  Not use inhalers.  On presentation his sats were 81% so he was immediately brought back to the ER for evaluation.  Currently denies any headache, ear pain, sore throat, change in taste or smell, or chest pain.  Denies any abdominal pain, nausea or diarrhea no leg pain or swelling.  When asked if he needs a nicotine patch as he will probably be in the emergency room or hospital for a while he states he has not smoked since Thanksgiving day.  He defers at this time    Allergies:  Allergies   Allergen Reactions     Codeine Rash       Problem List:    Patient Active Problem List    Diagnosis Date Noted     Insect bite of hand with infection 09/29/2019     Priority: Medium     Cellulitis of finger of left hand 09/27/2019     Priority: Medium     SHOLA (obstructive sleep apnea) 09/27/2019     Priority: Medium     Cigarette smoker 09/27/2019     Priority: Medium     Type 2 diabetes mellitus without complication, without long-term current use of insulin (H) 09/27/2019     Priority: Medium     Chronic hepatitis C without hepatic coma (H) 09/27/2019     Priority: Medium        Past Medical History:    Past Medical History:   Diagnosis Date     DM2 (diabetes mellitus, type 2) (H)      Hepatitis C carrier (H)      SHOLA on CPAP        Past Surgical History:    Past Surgical History:   Procedure Laterality Date     IRRIGATION AND DEBRIDEMENT UPPER EXTREMITY, COMBINED Left 9/29/2019    Procedure: Irrigationa and Debridement Left  Second Finger Wound;  Surgeon: Jaime Jordan MD;  Location: [facility] OR     SHOULDER SURGERY  2000     TONSILLECTOMY & ADENOIDECTOMY  1990       Family History:    Family History   Problem Relation Age of Onset     Diabetes Mother      Heart Disease Father      Cerebrovascular Disease Father         hx of stroke       Social History:  Marital Status:   [2]  Social History     Tobacco Use     Smoking status: Current Every Day Smoker     Packs/day: 0.25     Smokeless tobacco: Never Used   Vaping Use     Vaping Use: Never used   Substance Use Topics     Alcohol use: Not Currently     Drug use: Not on file        Medications:    atorvastatin (LIPITOR) 20 MG tablet  dapagliflozin (FARXIGA) 5 MG TABS tablet  losartan (COZAAR) 50 MG tablet  metFORMIN (GLUCOPHAGE) 500 MG tablet  aspirin 81 MG EC tablet  blood glucose (ACCU-CHEK JARON PLUS) test strip  Blood Glucose Calibration (MEDISENSE GLUCOSE KETONE CONTR) LIQD  calcium carbonate (CALCIUM CARBONATE) 600 MG tablet  cetirizine (ZYRTEC) 5 MG tablet  empagliflozin (JARDIANCE) 10 MG TABS tablet  ibuprofen (ADVIL/MOTRIN) 200 MG tablet  metFORMIN (GLUCOPHAGE) 500 MG tablet  Respiratory Therapy Supplies (CARETOUCH 2 CPAP HOSE ) MISC  vitamin (B COMPLEX) capsule  vitamin C (ASCORBIC ACID) 100 MG tablet  Zinc Sulfate (ZINC 15 PO)          Review of Systems all other systems are reviewed and are negative    Physical Exam   BP: 113/66  Pulse: 81  Temp: 98.7  F (37.1  C)  Resp: 18  Weight: 85.3 kg (188 lb)  SpO2: (!) 81 %      Physical Exam General alert cooperative male in moderate respiratory distress.  HEENT shows no facial droop.  Nasal mucosal swelling.  Speech is clear.  Handling secretions.  Neck reveals no stridor.  Lungs reveal poor air movement with bibasilar crackles.  Cardiac auscultation was normal.  Benign nontender abdomen.  No leg pain or swelling.  Negative Homans.    ED Course        Procedures              EKG Interpretation:      Interpreted by Haroldo  ARELIS Phillips MD  Time reviewed: 15:30  Symptoms at time of EKG: SOB   Rhythm: normal sinus   Rate: Normal  Axis: Normal  Ectopy: none  Conduction: RSR in V1  ST Segments/ T Waves: No acute ischemic changes  Q Waves: none  Comparison to prior: Unchanged from 9/29/19    Clinical Impression: normal EKG                Critical Care time:  was 35 minutes for this patient excluding procedures.               Results for orders placed or performed during the hospital encounter of 12/01/21 (from the past 24 hour(s))   CBC with platelets differential    Narrative    The following orders were created for panel order CBC with platelets differential.  Procedure                               Abnormality         Status                     ---------                               -----------         ------                     CBC with platelets and d...[[id number]]  Abnormal            Final result                 Please view results for these tests on the individual orders.   D dimer quantitative   Result Value Ref Range    D-Dimer Quantitative 1.54 (H) 0.00 - 0.50 ug/mL FEU    Narrative    This D-dimer assay is intended for use in conjunction with a clinical pretest probability assessment model to exclude pulmonary embolism (PE) and deep venous thrombosis (DVT) in outpatients suspected of PE or DVT. The cut-off value is 0.50 ug/mL FEU.   INR   Result Value Ref Range    INR 0.98 0.85 - 1.15   Comprehensive metabolic panel   Result Value Ref Range    Sodium 139 133 - 144 mmol/L    Potassium 3.8 3.4 - 5.3 mmol/L    Chloride 109 94 - 109 mmol/L    Carbon Dioxide (CO2) 24 20 - 32 mmol/L    Anion Gap 6 3 - 14 mmol/L    Urea Nitrogen 25 7 - 30 mg/dL    Creatinine 0.91 0.66 - 1.25 mg/dL    Calcium 8.0 (L) 8.5 - 10.1 mg/dL    Glucose 215 (H) 70 - 99 mg/dL    Alkaline Phosphatase 44 40 - 150 U/L    AST 44 0 - 45 U/L    ALT 21 0 - 70 U/L    Protein Total 6.8 6.8 - 8.8 g/dL    Albumin 2.7 (L) 3.4 - 5.0 g/dL    Bilirubin Total 0.4 0.2 - 1.3 mg/dL     GFR Estimate 85 >60 mL/min/1.73m2   Lactic acid whole blood   Result Value Ref Range    Lactic Acid 1.2 0.7 - 2.0 mmol/L   Troponin I   Result Value Ref Range    Troponin I High Sensitivity 19 <79 ng/L   Blood gas venous   Result Value Ref Range    pH Venous 7.43 7.32 - 7.43    pCO2 Venous 38 (L) 40 - 50 mm Hg    pO2 Venous 49 (H) 25 - 47 mm Hg    Bicarbonate Venous 25 21 - 28 mmol/L    Base Excess/Deficit (+/-) 0.7 -7.7 - 1.9 mmol/L    FIO2 21    CRP inflammation   Result Value Ref Range    CRP Inflammation 85.6 (H) 0.0 - 8.0 mg/L   CBC with platelets and differential   Result Value Ref Range    WBC Count 5.3 4.0 - 11.0 10e3/uL    RBC Count 4.67 4.40 - 5.90 10e6/uL    Hemoglobin 14.1 13.3 - 17.7 g/dL    Hematocrit 41.9 40.0 - 53.0 %    MCV 90 78 - 100 fL    MCH 30.2 26.5 - 33.0 pg    MCHC 33.7 31.5 - 36.5 g/dL    RDW 14.9 10.0 - 15.0 %    Platelet Count 167 150 - 450 10e3/uL    % Neutrophils 82 %    % Lymphocytes 13 %    % Monocytes 5 %    % Eosinophils 0 %    % Basophils 0 %    % Immature Granulocytes 0 %    NRBCs per 100 WBC 0 <1 /100    Absolute Neutrophils 4.4 1.6 - 8.3 10e3/uL    Absolute Lymphocytes 0.7 (L) 0.8 - 5.3 10e3/uL    Absolute Monocytes 0.2 0.0 - 1.3 10e3/uL    Absolute Eosinophils 0.0 0.0 - 0.7 10e3/uL    Absolute Basophils 0.0 0.0 - 0.2 10e3/uL    Absolute Immature Granulocytes 0.0 <=0.4 10e3/uL    Absolute NRBCs 0.0 10e3/uL   Symptomatic Influenza A/B & SARS-CoV2 (COVID-19) Virus PCR Multiplex Nasopharyngeal    Specimen: Nasopharyngeal; Swab   Result Value Ref Range    Influenza A PCR Negative Negative    Influenza B PCR Negative Negative    SARS CoV2 PCR Positive (A) Negative    Narrative    Testing was performed using the onel SARS-CoV-2 & Influenza A/B Assay on the onel Acacia System. This test should be ordered for the detection of SARS-CoV-2 and influenza viruses in individuals who meet clinical and/or epidemiological criteria. Test performance is unknown in asymptomatic patients. This  test is for in vitro diagnostic use under the FDA EUA for laboratories certified under CLIA to perform moderate and/or high complexity testing. This test has not been FDA cleared or approved. A negative result does not rule out the presence of PCR inhibitors in the specimen or target RNA in concentration below the limit of detection for the assay. If only one viral target is positive but coinfection with multiple targets is suspected, the sample should be re-tested with another FDA cleared, approved or authorized test, if coinfection would change clinical management. [facility] are certified under the Clinical Laboratory Improvement Amendments of 1988 (CLIA-88) as  qualified to perform moderate and/or high complexity laboratory testing.   CT Chest Pulmonary Embolism w Contrast    Narrative    CT CHEST PULMONARY EMBOLISM WITH CONTRAST  12/1/2021 4:55 PM    HISTORY:  Shortness of breath, PE suspected, low/intermediate prob,  positive D-dimer.    TECHNIQUE: Scans obtained from the apices through the diaphragm with  IV contrast. 70mL Isovue 370 IV injected. Radiation dose for this scan  was reduced using automated exposure control, adjustment of the mA  and/or kV according to patient size, or iterative reconstruction  technique.    COMPARISON:  Chest x-ray on 9/29/2019.    FINDINGS:  Chest/mediastinum: No evidence of pulmonary embolism. No cardiomegaly  or significant pericardial effusion. Multiple prominent mediastinal  and hilar lymph nodes, likely reactive. Moderate atherosclerotic  vascular calcification of the coronary arteries.    Lungs and pleura: No pleural effusion or pneumothorax. Upper lobes  predominant emphysematous changes. Bibasilar interstitial pulmonary  opacities with associated traction bronchiectasis and honeycombing,  likely related to interstitial lung disease. Basilar predominant  patchy pulmonary opacities are also noted, could represent infection.    Upper abdomen: Limited  evaluation of the upper abdomen due to lack of  coverage and timing of contrast. Diffuse hypoattenuation of the liver,  likely to underlying hepatic steatosis. 1.9 cm right adrenal nodule  (series 4 image 128) with low Hounsfield units of 2, likely represents  adrenal adenoma. 1.6 cm left adrenal nodule (series 4 image 137) with  low Hounsfield units of 4, also likely represents adrenal adenoma.    Bones and soft tissue: No suspicious osseous lesion.      Impression    IMPRESSION:   1. No evidence of pulmonary embolism.  2. Bibasilar and subpleural predominant interstitial pulmonary  opacities, traction bronchiectasis and honeycombing, likely related to  underlying interstitial lung disease. Superimposed patchy pulmonary  opacities, could be infectious.  3. Multiple prominent mediastinal and hilar lymph nodes, likely  reactive.  4. Moderate atherosclerotic vascular calcification of the coronary  arteries.  5. Hepatic steatosis.  6. Bilateral adrenal nodules with imaging characteristics most  consistent with lipid rich adenomas.    JOSE DONIS MD         SYSTEM ID:  [id number]       Medications   remdesivir (VEKURY) 100 mg in 0.9% NaCl 250 mL intermittent infusion SOLN 100 mg (has no administration in time range)     And   0.9% sodium chloride BOLUS (has no administration in time range)   aspirin EC tablet 81 mg (81 mg Oral Not Given 12/1/21 1923)   atorvastatin (LIPITOR) tablet 20 mg (20 mg Oral Not Given 12/1/21 1923)   cetirizine (zyrTEC) solution 5 mg (5 mg Oral Not Given 12/1/21 1924)   empagliflozin (JARDIANCE) tablet 10 mg (10 mg Oral Not Given 12/1/21 1924)   losartan (COZAAR) tablet 50 mg (50 mg Oral Not Given 12/1/21 1925)   metFORMIN (GLUCOPHAGE) tablet 1,000 mg (1,000 mg Oral Given 12/1/21 2000)   albuterol (PROVENTIL HFA/VENTOLIN HFA) inhaler (has no administration in time range)   albuterol (PROVENTIL HFA/VENTOLIN HFA) inhaler (2 puffs Inhalation Given 12/1/21 1550)   dexamethasone PF (DECADRON)  injection 6 mg (6 mg Intravenous Given 12/1/21 1550)   iopamidol (ISOVUE-370) solution 500 mL (70 mLs Intravenous Given 12/1/21 1627)   Sodium Chloride 0.9 % bag 100mL for CT scan (70 mLs Intravenous Given 12/1/21 1628)   remdesivir 200 mg in sodium chloride 0.9 % 250 mL intermittent infusion (0 mg Intravenous Stopped 12/1/21 2026)     Followed by   0.9% sodium chloride BOLUS (0 mLs Intravenous Stopped 12/1/21 2026)     IV is established and patient is given Decadron and albuterol MDI.  Sats increased to 90 to 91% on 6 L by nasal cannula.  Covid positive so remdesivir is ordered.  Assessments & Plan (with Medical Decision Making)   Andrew Alvarez is a 70 year old male who presents with complaints of congestion and nonproductive cough since Thanksgiving.  Wife has similar symptoms.  Associated fever and body aches.  Exposed to Covid.  He has not received Covid vaccines.  Does have risk factors including type 2 diabetes, sleep apnea, and has a 60+ pack year smoking history.  States he is never been diagnosed with COPD.  Has had a history of pneumonia.  Not use inhalers.  On presentation his sats were 81% so he was immediately brought back to the ER for evaluation.  Currently denies any headache, ear pain, sore throat, change in taste or smell, or chest pain.  Denies any abdominal pain, nausea or diarrhea no leg pain or swelling.  When asked if he needs a nicotine patch as he will probably be in the emergency room or hospital for a while he states he has not smoked since Thanksgiving day.  He defers at this time.  On presentation patient was afebrile but hypoxic with sats of 81% on room air.  Not tachycardic or hypotensive.  On exam he had patient was hypoxic but afebrile.  He had poor air movement basilar crackles.  No leg pain or swelling.  EKG did not show any acute ischemic change and was unchanged from previous.  Blood work showed an elevated glucose, CRP, and D-dimer.  CT chest showed no evidence of PE but  does have bilateral changes consistent with Covid or COPD.  With 6 L nasal cannula, albuterol MDI, and steroids patient's maintaining his sats in the low 90s.  Remdesivir is ordered.  No bed availability in the state for this patient.  Border in ER department.  His home meds ordered.  A diabetic diet is ordered.  I have reviewed the nursing notes.    I have reviewed the findings, diagnosis, plan and need for follow up with the patient.       New Prescriptions    No medications on file       Final diagnoses:   Pneumonia due to 2019 novel coronavirus   Hypoxia   Tobacco abuse       12/1/2021   [facility] EMERGENCY DEPT     Haroldo Phillips MD  12/01/21 [id number]       Haroldo Phillips MD  12/01/21 [id number]

## 2021-12-02 NOTE — ED PROVIDER NOTES
SIGN OUT NOTE    Patient is out to me at change of shift by Dr. Mackay.  He has been in this ED for 17 hours at this point.    This is a 70-year-old man, history of type 2 diabetes, SHOLA, 60+ pack year smoking,Body mass index is 30.34 kg/m .   He presented with Covid-like symptoms since Thanksgiving.  On presentation his O2 sats were 81% on room air.  Diagnosed Covid positive.  He has received dexamethasone, remdesivir and has been started on Lovenox.  He was started on O2 but rapidly progressed to needing BiPAP.    Currently, patient on BiPAP with FiO2 of 70%.    Today's plan: Await bed placement.  Continue Decadron, Lovenox, home medications.  Continue to closely monitor respiratory status.    Patient able to be weaned off of BiPAP to high flow oxygen at about 4:30 PM but he is on FiO2 100%, 50 L.  He has been given albuterol MDI.  Continue to closely monitor.    Patient signed back out to Dr. Mackay at change of shift.  Still awaiting bed placement.             Christina Chinchilla MD  12/03/21 0129

## 2021-12-02 NOTE — ED NOTES
Adjusted patient's mask to reduce leak. Leak went from 191 to below 90. Mask removed momentarily for patient to drink water.

## 2021-12-02 NOTE — PROGRESS NOTES
Component Ref Range & Units  7:16 AM  5:55 AM 12:17 AM 1 d ago    pH Arterial 7.35 - 7.45 7.43        pCO2 Arterial 35 - 45 mm Hg 36        pO2 Arterial 80 - 105 mm Hg 70 Low         Bicarbonate Arterial 21 - 28 mmol/L 24        Oxyhemoglobin Arterial 92 - 100 % 93        Base Excess/Deficit (+/-) -9.0 - 1.8 mmol/L -0.2        FIO2  70  70  100  21    Comment: allens test completed        ABG this am show's Hypoxia on BIPAP with FIO2 = 70%  PF RATIO = 100

## 2021-12-02 NOTE — ED PROVIDER NOTES
This patient was signed out to me at change of shift by Dr. Haroldo Moya to follow the patient overnight while we wait for bed placement.  Please see his note for complete details on history and physical and initial plan.  I was asked to follow-up on the VBG in the morning.  Patient continues on his Decadron, remdesivir, Lovenox and home medications.  Patient had no issues overnight with me, remained stable on BiPAP overnight with no change in settings.  Repeat VBG this morning shows good O2 level and no other issues.  There were no beds able to be found overnight and patient will be signed out at change of shift to Dr. Chinchilla.     Nato Mackay MD  12/02/21 0604

## 2021-12-02 NOTE — PROGRESS NOTES
12/02/21 0700   CPAP/BiPAP/Settings   IPAP/EPAP (cmH2O) 14/8   Rate (breaths/min) 12   Oxygen (%) 70   Timed Inspiration (sec) 1   IPAP RISE  Settings (V60) 1   CPAP/BiPAP Patient Parameters   IPAP (cm H2O) 14 cmH2O   EPAP (cm H2O) 8 cmH2O   Pressure Support (cm H2O) 10 cmH2O   RR Total (breaths/min) 30 breaths/min   Vt (mL) 452 mL   Minute Ventilation (L/min) 13.7 L/min   Peak Inspiratory Pressure (cm H2O) 15 cmH2O   Pt.  Leak (L/min) 109 L/min   CPAP/BiPAP/AVAPS/AVAPS AE Alarms   High Pressure (cm H2O) 50 cmH2O   Low Pressure Delay (sec) 20 sec   Lo Min Vent 2   High Rate (breaths/min) 50 breaths/min   Low Rate (breaths/min) 10   Audible Alarm set at (Volume of Alarm) 5   CPAP/BiPAP/AVAPS/AVAPS AE Patient Assessment   Interface Face Mask - Medium   Skin Integrity INTACT   RT Device Skin Assessment   Oxygen Delivery Device CPAP/BiPAP Mask   Site Appearance neck circumference Clean and dry   Site Appearance bridge of nose Clean and dry   Site Appearance occiput Clean and dry   Strap Tightness Finger Allowance between head and device strap   Skin Interventions Taken No adjustments needed   Respiratory WDL   Respiratory WDL X   Expansion/Accessory Muscles/Retractions abdominal muscle use   Breath Sounds   Breath Sounds All Fields   All Lung Fields Breath Sounds coarse   LLL Breath Sounds coarse   RLL Breath Sounds coarse   Patient continues to require BIPAP for ventilatory support

## 2021-12-02 NOTE — PROGRESS NOTES
12/02/21 1046   Mode: CPAP/ BiPAP/ AVAPS/ AVAPS AE   CPAP/BiPAP/ AVAPS/ AVAPS AE Mode BiPAP S/T   CPAP/BiPAP/Settings   $BIPAP/CPAP Therapy continuous   IPAP/EPAP (cmH2O) 14/8   Rate (breaths/min) 12   Oxygen (%) 70   Timed Inspiration (sec) 1   IPAP RISE  Settings (V60) 1   CPAP/BiPAP Patient Parameters   IPAP (cm H2O) 14 cmH2O   EPAP (cm H2O) 8 cmH2O   Pressure Support (cm H2O) 10 cmH2O   RR Total (breaths/min) 26 breaths/min   Vt (mL) 445 mL   Minute Ventilation (L/min) 12.3 L/min   Peak Inspiratory Pressure (cm H2O) 15 cmH2O   Pt.  Leak (L/min) 81 L/min

## 2021-12-02 NOTE — PROGRESS NOTES
12/01/21 1900   CPAP/BiPAP/Settings   $BIPAP/CPAP Therapy continuous   IPAP/EPAP (cmH2O) 12/8   Rate (breaths/min) 12   Oxygen (%) 70   Timed Inspiration (sec) 1   IPAP RISE  Settings (V60) 1   CPAP/BiPAP Patient Parameters   IPAP (cm H2O) 12 cmH2O   EPAP (cm H2O) 8 cmH2O   Pressure Support (cm H2O) 10 cmH2O   RR Total (breaths/min) 33 breaths/min   Vt (mL) 388 mL   Minute Ventilation (L/min) 12.3 L/min   Peak Inspiratory Pressure (cm H2O) 13 cmH2O   Pt.  Leak (L/min) 70 L/min   Patient is requiring BIPAP for ventilatory support

## 2021-12-03 NOTE — ED NOTES
Over the last 30 minutes sats decreasing on the high flow despite increasing flow meter to 60. 89-90% on 100% FiO2. Pt states feels breathing unchanged however appears dyspneic particularly with any sort of movement. Tachypneic as well with RR in the upper 30s-low 40s. Explained rationale for placing back on BiPAP. Settings 14/8 100% to start with. Will titrate down as able.

## 2021-12-03 NOTE — ED NOTES
Telephone update provided to Coby ([phone]), patient's spouse regarding worsening respiratory status and being placed back on BiPAP. POC reviewed including rest tonight with the BiPAP on, every 6 hour blood gases, and AM labs. She is concerned about her 's nutritional status. Reassured this would be addressed with the care team in the AM.

## 2021-12-03 NOTE — PROGRESS NOTES
12/02/21 1608 12/02/21 1633 12/02/21 1921   Mode: CPAP/ BiPAP/ AVAPS/ AVAPS AE   CPAP/BiPAP/ AVAPS/ AVAPS AE Mode BiPAP S/T other (see comments)  --    CPAP/BiPAP/Settings   IPAP/EPAP (cmH2O) 14/8  --   --    Rate (breaths/min) 12  --   --    Oxygen (%) 50 100 100   O2 Flow Rate (L/min)  --  50 50   Timed Inspiration (sec) 1  --   --    IPAP RISE  Settings (V60) 1  --   --    CPAP/BiPAP Patient Parameters   IPAP (cm H2O) 14 cmH2O  --   --    EPAP (cm H2O) 8 cmH2O  --   --    Pressure Support (cm H2O) 10 cmH2O  --   --    RR Total (breaths/min) 31 breaths/min  --   --    Vt (mL) 655 mL  --   --    Minute Ventilation (L/min) 18.5 L/min  --   --    Peak Inspiratory Pressure (cm H2O) 15 cmH2O  --   --    Pt.  Leak (L/min) 92 L/min  --   --    CPAP/BiPAP/AVAPS/AVAPS AE Alarms   High Pressure (cm H2O) 50 cmH2O  --   --    Low Pressure Delay (sec) 20 sec  --   --    Lo Min Vent 2  --   --    High Rate (breaths/min) 50 breaths/min  --   --    Low Rate (breaths/min) 10  --   --    CPAP/BiPAP/AVAPS/AVAPS AE Patient Assessment   Interface Face Mask - Medium  --   --    Skin Integrity intact  --   --    RT Device Skin Assessment   Oxygen Delivery Device CPAP/BiPAP Mask High Flow Nasal Cannula  --    Site Appearance neck circumference Clean and dry Clean and dry  --    Site Appearance nares (inner)  --  Clean and dry  --    Site Appearance bridge of nose Clean and dry Clean and dry  --    Site Appearance of ears  --  Clean and dry  --    Site Appearance occiput Clean and dry Clean and dry  --    Strap Tightness Finger Allowance between head and device strap Finger Allowance between head and device strap  --    Skin Interventions Taken No adjustments needed No adjustments needed  --    Respiratory WDL   Respiratory WDL  --   --  X   Rhythm/Pattern, Respiratory  --   --  dyspnea on exertion   Expansion/Accessory Muscles/Retractions  --   --  abdominal muscle use   Cough Frequency  --   --  frequent   Breath Sounds   Breath  Sounds  --   --  LLL;RLL   LLL Breath Sounds  --  wheezes, expiratory wheezes, expiratory   RLL Breath Sounds  --  wheezes, expiratory wheezes, expiratory     Transitioned patient to High flow nasal cannula for CPAP ventilatory support  MDI PRN given twice  Return patient to BIPAP if needed druing the night for work of breathing or desaturation

## 2021-12-03 NOTE — PROGRESS NOTES
Component Ref Range & Units  5:17 AM   (12/3/21) 12:19 AM   (12/3/21) 1 d ago   (12/2/21) 1 d ago   (12/2/21) 1 d ago   (12/2/21) 1 d ago   (12/2/21) 1 d ago   (12/2/21)    pH Venous 7.32 - 7.43 7.35  7.44 High   7.45 High   7.44 High    7.42  7.40     pCO2 Venous 40 - 50 mm Hg 32 Low   35 Low   34 Low   37 Low    38 Low   40     pO2 Venous 25 - 47 mm Hg 36  59 High   47  55 High    55 High   45     Bicarbonate Venous 21 - 28 mmol/L 17 Low   24  24  25   25  25     Base Excess/Deficit (+/-) -7.7 - 1.9 mmol/L -7.2  -0.2  0.2  0.8   0.2  -0.3     FIO2  80  80  100  70  70 CM  70  100         Decreasing PVO2 levels  No ABG

## 2021-12-04 NOTE — ED PROVIDER NOTES
Assumed care at change of shift from Dr. Sidney Ba.  Patient is known to me from yesterday.  Remained stable overnight, did change out BiPAP mask but remained on same settings and is stable.  ABG for 8 AM has been timed.  Still awaiting bed placement.  Continues on Decadron, remdesivir, Lovenox, and home medications.    No changes throughout the day. One episode of agitation, improved with Ativan. Also given small dose of morphine for pain due to prolonged stay in hospital bed. Transitioned care back to Dr. Ba at end of shift, still awaiting bed placement. There are still no appropriate beds available at this facility or within the state [facility]. Have tried to contact C4 line, still no beds. Will continue to board in ED.     Roya Healy,   12/07/21 5891

## 2021-12-04 NOTE — ED PROVIDER NOTES
Assumed care at change of shift from Dr. Nato Mackay.  See his note for patient course overnight.  Covid positive patient, symptoms started on 11/25/2021.  81% SPO2 on room air.  Has been weaned off of BiPAP to high flow, but then needed to go back onto BiPAP.  Maintaining saturations overnight.  Suggested checking ABG to check oxygenation.  Continuing to wait for bed placement.    RT down to assess the patient, checked an ABG.  Made adjustments on settings.  Continue to wait for a bed all day, there are no beds within the [facility] nor any beds within the [facility].  Patient remains in the ED boarding.  Is continued on Decadron, remdesivir, Lovenox, and home medications.  Will sign out to Dr. Sidney Ba for monitoring overnight and disposition if bed does become available.  Otherwise, plan to resume care at change of shift in the morning and continue to look for bed placement.     Roya Healy, DO  12/03/21 4068     100% of the time

## 2021-12-04 NOTE — ED PROVIDER NOTES
Andrew Alvarez is a 70 year old male who was signed out to me at the change of shift. Patient is being boarded overnight. He is currently on BiPAP, and was pulling at some lines on the BiPAP machine. His mask had to be changed earlier in the evening. Has an ABG ordered for 8 AM this morning. He has received remdesivir, and is on daily Decadron. Patient was signed out to Dr. Roya Healy at the change of shift.     Aysha Ba MD  12/04/21 0764

## 2021-12-05 PROBLEM — J96.01 ACUTE RESPIRATORY FAILURE WITH HYPOXIA (H): Status: ACTIVE | Noted: 2021-01-01

## 2021-12-05 NOTE — PROGRESS NOTES
Pt intubated with a a 7.5 ETT with complications.  Positive color change on color metric device.  Bs heard bilaterally.  Pt placed on Vent with settings of AC/VC 20/380/+15/100%.  ETT secured at 24 at the lips.  OG was placed, and ETT was confirmed on chest xray.  Discuss with MD about placing central line, for access to help with in drips and sedation.  Will obatin ABG in 1 hour post intubation.    RT Eliza on 12/5/2021 at 10:43 AM

## 2021-12-05 NOTE — ED NOTES
"Pt with increased agitation.  Trying to get up and \"get ready for work\".  PRN Ativan given.  Assisted pt to FACE jeannie schwartz with his wife Melinda.  Resting on cart.  On continuous monitor for safety.   "

## 2021-12-05 NOTE — ED PROVIDER NOTES
Andrew Alvarez is a 70 year old with type 2 diabetes, sleep apnea, and 60+ pack year smoking history with nonproductive cough since Thanksgiving, with positive Covid exposure and positive Covid test on December 1.  Patient has been on BiPAP since December 3.      Patient is on day 2 of BiPAP at 80% FiO2.  Patient has been more confused, with suspected delirium.      9:39 PM: Patient has had several doses of Ativan this evening, and continuing to manifest ongoing confusion and agitation at times.  Patient will receive Zyprexa 5 mg IM.     3:50 AM: Patient became more agitated, sitting at the side of the bed, patient repeatedly is asking to go to the bathroom.  Patient needed to be redirected back into bed.  He has not received any Ativan since 9:30 PM last night.  He has received a total of 15 mg of Zyprexa overnight.  We will administer Ativan 0.5 mg if he continues to be agitated.  Venous blood gas and basic metabolic panel was reordered.  Will obtain urinalysis the next time he voids.    Basic metabolic panel at 4:16 AM: Sodium of 144, potassium of 4.2, chloride of 114, BUN of 36, creatinine of 0.61, calcium of 8.0, glucose of 260.  Venous blood gas reveals pH of 7.41, PCO2 of 40, PO2 of 40, bicarb of 25, FiO2 of 80%.    6:43 AM: Patient has been extremely agitated over the last 2 to 3 hours.  He received Haldol 2 mg IV, and is now starting a Precedex infusion for severe agitation and delirium.  Patient will be signed out to Dr. Roya Healy at the change of shift.     Aysha Ba MD  12/05/21 0032

## 2021-12-05 NOTE — ANESTHESIA CARE TRANSFER NOTE
Patient: Andrew Alvarez    Procedure: * No procedures listed *       Diagnosis: * No pre-op diagnosis entered *  Diagnosis Additional Information: No value filed.    Anesthesia Type:   No value filed.     Note:    Oropharynx: ventilatory support  Level of Consciousness: unresponsive  Patient oxygen source: On ventilator currently.    Independent Airway: airway patency not satisfactory and stable    Vital Signs Stable: post-procedure vital signs reviewed and stable  Report to RN Given: handoff report given  Patient transferred to: Emergency Department  Comments: Placed Central line X 1  Handoff Report: Identifed the Patient, Identified the Reponsible Provider, Reviewed the pertinent medical history, Discussed the surgical course, Reviewed Intra-OP anesthesia mangement and issues during anesthesia, Set expectations for post-procedure period and Allowed opportunity for questions and acknowledgement of understanding      Vitals:  Vitals Value Taken Time   /63 12/05/21 1625   Temp     Pulse 84 12/05/21 1630   Resp 24 12/05/21 1630   SpO2 97 % 12/05/21 1629   Vitals shown include unvalidated device data.    Electronically Signed By: MARCO A Duncan CRNA  December 5, 2021  4:31 PM

## 2021-12-05 NOTE — ANESTHESIA PROCEDURE NOTES
Central Line/PA Catheter Placement    Pre-Procedure   Staff -        CRNA: Jaime Erickson APRN CRNA       Performed By: CRNA       Location: ICU       Procedure Start/Stop Times: 12/5/2021 3:23 PM and 12/5/2021 4:00 PM       Pre-Anesthestic Checklist: patient identified, risks and benefits discussed, informed consent, monitors and equipment checked, pre-op evaluation and at physician/surgeon's request  Timeout:       Correct Patient: Yes        Correct Procedure: Yes        Correct Site: Yes        Correct Position: Yes        Correct Laterality: N/A     Procedure   Procedure: central line, new line and emergent       Diagnosis: Respiratory failure, Hypotension COVID +        Laterality: left       Insertion Site: internal jugular.       Patient Position: supine and Trendelenburg  Sterile Prep        All elements of maximal sterile barrier technique followed       Patient Prep/Sterile Barriers: draped, patient draped, hand hygiene, gloves , hat , mask , draped, gown, sterile gel and probe cover       Skin prep: Chloraprep  Insertion/Injection        Local skin infiltrated with 1 mL of 1% lidocaine.        Technique: ultrasound guided and Seldinger Technique        1. Ultrasound was used to evaluate the access site.       2. Vein evaluated via ultrasound for patency/adequacy.       3. Using real-time ultrasound the needle/catheter was observed entering the artery/vein.       5. The visualized structures were anatomically normal.       6. There were no apparent abnormal pathologic findings.       Catheter Type/Size: 7 Fr, 20 cm, T.L.  Narrative         Secured by: suture       Tegaderm and Biopatch dressing used.       Complications: None apparent,        blood aspirated from all lumens,        All lumens flushed: Yes       Verification method: X-ray and Placement to be verified post-op   Comments:  internal jugular Central Line placed X 1 on Pts left side of neck X1 without complications, Pt seemed to have tolerated  procedure well, CXR ordered

## 2021-12-05 NOTE — ANESTHESIA PREPROCEDURE EVALUATION
Anesthesia Pre-Procedure Evaluation    Patient: Andrew Alvarez   MRN: [id number] : 1950        Preoperative Diagnosis: * No pre-op diagnosis entered *    Procedure : * No procedures listed *          Past Medical History:   Diagnosis Date     DM2 (diabetes mellitus, type 2) (H)      Hepatitis C carrier (H)      SHOLA on CPAP       Past Surgical History:   Procedure Laterality Date     IRRIGATION AND DEBRIDEMENT UPPER EXTREMITY, COMBINED Left 2019    Procedure: Irrigationa and Debridement Left Second Finger Wound;  Surgeon: Jaime Jordan MD;  Location: [facility]     SHOULDER SURGERY       TONSILLECTOMY & ADENOIDECTOMY        Allergies   Allergen Reactions     Codeine Rash      Social History     Tobacco Use     Smoking status: Current Every Day Smoker     Packs/day: 0.25     Smokeless tobacco: Never Used   Substance Use Topics     Alcohol use: Not Currently      Wt Readings from Last 1 Encounters:   21 85.3 kg (188 lb)              OUTSIDE LABS:  CBC:   Lab Results   Component Value Date    WBC 5.3 2021    WBC 8.2 2019    HGB 14.1 2021    HGB 12.6 (L) 2019    HCT 41.9 2021    HCT 38.5 (L) 2019     2021     2019     BMP:   Lab Results   Component Value Date     2021     2021    POTASSIUM 4.2 2021    POTASSIUM 3.8 2021    CHLORIDE 114 (H) 2021    CHLORIDE 109 2021    CO2 24 2021    CO2 24 2021    BUN 36 (H) 2021    BUN 25 2021    CR 0.61 (L) 2021    CR 0.91 2021     (H) 2021     (H) 2021     COAGS:   Lab Results   Component Value Date    INR 0.98 2021     POC:   Lab Results   Component Value Date     (H) 10/01/2019     HEPATIC:   Lab Results   Component Value Date    ALBUMIN 2.7 (L) 2021    PROTTOTAL 6.8 2021    ALT 21 2021    AST 44 2021    ALKPHOS 44 2021    BILITOTAL 0.4  12/01/2021     OTHER:   Lab Results   Component Value Date    PH 7.26 (L) 12/05/2021    LACT 1.2 12/01/2021    A1C 7.6 (H) 09/03/2021    VICKI 8.0 (L) 12/05/2021    CRP 85.6 (H) 12/01/2021       Anesthesia Plan    ASA Status:  4, emergent    - Procedure: Procedure only, no anesthetic delivered      Anesthesia Type: IJ central line.              Consents            Postoperative Care            Comments:    Other Comments: Called down to the ED for Central Line help per Dr. Healy.  Dr. Healy attempted a central line (internal jugular) on pts right side of his neck without success.  I reviewed patients record with Dr. Healy.  Patient currently on peripheral norepi gtt and needs a central line. Pt was intubated in the ED for respiratory failure, patients wife currently not here but Dr. Healy talked to her prior to intubation and also discussed a central line with her.  Pt is being transferred soon to [facility] and needs urgent central access, therefore did not contact pts wife at this point.             Jaime Erickson, APRN CRNA

## 2021-12-05 NOTE — ED NOTES
Pt continues to e restless in bed.  Trying to climb out.  MD aware and Meds given per MAR.  Sitter at bedside.

## 2021-12-05 NOTE — ED NOTES
ERASMO Oliveira updated on pt and notified that he left. Attempted x 2 to call pts wife Coby to update and let her know that pt was transferred, was unable to reach her.

## 2021-12-05 NOTE — ED NOTES
Bed: ED05  Expected date:   Expected time:   Means of arrival:   Comments:  Room 3 for intubation

## 2021-12-05 NOTE — ED PROVIDER NOTES
"Assumed care from Dr. Sidney Ba at change of shift. Patient known from last shift. Still awaiting bed placement. Has remained on bipap. Agitated overnight, given Zyprexa, Ativan, Haldol, and started on Precedex this AM. Repeat labs pending.     Assessed by RT this AM. Patient is declining, decision made to intubate. Discussed with wife, Coby, on phone at 0930. She agrees to proceed.    [facility] Procedure Note          Intubation:     Date/Time:10:15 AM  Performed by: Roya Healy DO    Consent was obtained after discussing the risks, benefits and alternatives with the Family.  Risks and benefits: risks, benefits and alternatives were discussed.  Patient identity confirmed: verbally with patient and arm band  Time out: Immediately prior to procedure a \"time out\" was called to verify the correct patient, procedure, equipment, support staff and site/side marked as required.    Indication: Acute respiratory failure.    Intubation method: Glidescope  Patient status: RSI  Preoxygenation: Mask  Pretreatment medications: None  Sedatives: Fentanyl and Midazolam (Versed)  Paralytic: rocuronium and succinylcholine  Laryngoscope size: Mac 4  Tube size: 7.5 cuffed with cuff inflated after placement  Number of attempts: 2  Cricoid pressure: yes  Cords visualized: yes  Post-procedure assessment: Breath sounds equal bilaterally with chest rise and absent over the epigastrium, Chest x-ray interpreted by me demonstrating endotracheal tube in appropriate position, and CO2 detector.  ETT to teeth: 23 cm  Tube secured with: ETT yanez    Patient tolerated the procedure well with no immediate complications.  Complications:  None       Patient on ventilator. Spoke with Dr. Burdick, intensivist at [facility]. Anticipating discharge later today, patient accepted and will be contacted by patient placement when bed is available. Requested we place central line prior to transfer. Anesthesia contacted for central line placement. " Sedated on Propofol drip, Versed, Fentanyl, and Levophed to maintain adequate perfusion.     Patient left ED in stable condition, transported to [facility] ICU.     Roya Healy,   12/07/21 1702

## 2021-12-06 NOTE — PHARMACY-ADMISSION MEDICATION HISTORY
Pharmacy Medication History  Admission medication history interview status for the 12/5/2021  admission is complete. See EPIC admission navigator for prior to admission medications     Location of Interview: Phone  Medication history sources: Patient's family/friend (wife) and Surescripts    Significant changes made to the medication list:  Added vit d  Modified ibuprofen    In the past week, patient estimated taking medication this percent of the time: 50-90% due to cost    Additional medication history information:   Never started farxiga or jardiance due to cost    Medication reconciliation completed by provider prior to medication history? No    Time spent in this activity: 15 minutes    Prior to Admission medications    Medication Sig Last Dose Taking? Auth Provider   aspirin 81 MG EC tablet Take 81 mg by mouth daily 12/2/2021 Yes Reported, Patient   atorvastatin (LIPITOR) 20 MG tablet Take 1 tablet (20 mg) by mouth daily 12/2/2021 Yes Kentrell Eckert MD   calcium carbonate (CALCIUM CARBONATE) 600 MG tablet Take 600 mg by mouth daily Past Week at Unknown time Yes Reported, Patient   cetirizine (ZYRTEC) 5 MG tablet Take 5 mg by mouth daily 12/2/2021 Yes Reported, Patient   ibuprofen (ADVIL/MOTRIN) 200 MG tablet Take 400 mg by mouth every 6 hours as needed for mild pain  prn Yes Reported, Patient   losartan (COZAAR) 50 MG tablet Take 1 tablet (50 mg) by mouth daily 12/2/2021 Yes Kentrell Eckert MD   metFORMIN (GLUCOPHAGE) 500 MG tablet Take 500 mg by mouth daily (with breakfast) and 1,000 mg at bedtime 12/2/2021 Yes Kentrell Eckert MD   vitamin (B COMPLEX) capsule Take 1 capsule by mouth daily  Past Week at Unknown time Yes Reported, Patient   vitamin C (ASCORBIC ACID) 1000 MG TABS Take 1,000 mg by mouth daily Past Week at Unknown time Yes Unknown, Entered By History   Vitamin D, Cholecalciferol, 25 MCG (1000 UT) TABS Take 1,000 Units by mouth daily Past Week at Unknown time Yes Unknown, Entered By History   Zinc  Sulfate (ZINC 15 PO) Take by mouth daily Past Week at Unknown time Yes Reported, Patient   blood glucose (ACCU-CHEK JARON PLUS) test strip TEST 1 TIME DAILY. DX E11.9 NIDDM TYPE II   Kentrell Eckert MD   Blood Glucose Calibration (MEDISENSE GLUCOSE KETONE CONTR) LIQD As directed. accu-check Jaron   Reported, Patient   dapagliflozin (FARXIGA) 5 MG TABS tablet Take 1 tablet (5 mg) by mouth daily never started  Kentrell Eckert MD   empagliflozin (JARDIANCE) 10 MG TABS tablet Take 1 tablet (10 mg) by mouth daily never started  Kentrell Eckert MD   Respiratory Therapy Supplies (CARETOUCH 2 CPAP HOSE ) MISC As directed. Auto CPAP at pressure: unknown   Length of Need: Lifetime, AHI: unknown   Reported, Patient       The information provided in this note is only as accurate as the sources available at the time of update(s)

## 2021-12-06 NOTE — PROGRESS NOTES
Prone Positioning Note      Date of initial prone positionin21    Number of days prone: 1  Frequency of head turns: Q2H  Was patient placed supine today: yes    ETT/Skin assessment: intact    Was ETT repositioned and re-taped today: yes    Skin assessment around ETT: intact  Skin barriers used: Cavilon    Plan: Q2H head turns while proned.    Emiliana Byers, RT

## 2021-12-06 NOTE — CONSULTS
"CLINICAL NUTRITION SERVICES  -  ASSESSMENT NOTE      Recommendations Ordered by Registered Dietitian (RD): Vital HP at 10 mL/hr to provide:  240 kcal, 21 g protein (0.3 g/kg), 27 g CHO, 0 g fiber, 201 mL H2O  Total with Propofol = 982 kcal (13 kcal/kg)  Flushes 60 mL every 4 hours   Certavite daily    Malnutrition: % Weight Loss:  > 2% in 1 week (severe malnutrition)  % Intake:  </= 50% for >/= 5 days (severe malnutrition)  Subcutaneous Fat Loss:  Unable to observe but suspected   Muscle Loss:  Unable to observe but suspected   Fluid Retention:  None noted    Malnutrition Diagnosis: Severe malnutrition  In Context of:  Acute illness or injury        REASON FOR ASSESSMENT  Andrew Alvarez is a 70 year old male seen by Registered Dietitian for Provider Order - Registered Dietitian to Assess and Order TF per Medical Nutrition Therapy Protocol      NUTRITION HISTORY  - Unable to obtain nutrition history as patient intubated and sedated.  Noted patient was transferred to [facility] after boarding in the ED at [facility] x 5 days where he was on Bipap.        CURRENT NUTRITION ORDERS  Diet Order:     NPO (day #5+)    Current Intake/Tolerance:  Plan for TF initiation today       NUTRITION FOCUSED PHYSICAL ASSESSMENT FOR DIAGNOSING MALNUTRITION)  No:  COVID+                Observed:    Unable     Obtained from Chart/Interdisciplinary Team:  None     ANTHROPOMETRICS  Height: 5'6\"  Weight: 77 kg (170#)(12/5)  Body mass index is 27.4 kg/m^2  Weight Status:  Overweight BMI 25-29.9  IBW: 64.5 kg   % IBW: 119%  Weight History:   Wt Readings from Last 10 Encounters:   12/06/21 77.6 kg (171 lb 1.2 oz)   12/01/21 85.3 kg (188 lb)   09/03/21 85.3 kg (188 lb)   03/17/21 88.5 kg (195 lb)   03/08/21 87.7 kg (193 lb 4 oz)   03/06/21 90.3 kg (199 lb)   03/05/21 91.4 kg (201 lb 8 oz)   10/23/19 90.3 kg (199 lb)   10/10/19 88.9 kg (196 lb)   10/08/19 88.9 kg (196 lb)     Down 17# or 9% over the last 5 days     LABS  Labs " reviewed    MEDICATIONS  Insulin drip  Norepi   Propofol at 28.1 mL/hr= 742 kcal       ASSESSED NUTRITION NEEDS PER APPROVED PRACTICE GUIDELINES:    Dosing Weight 77 kg   1st Week on TF:  9033-9141 kcal (70% needs)  Estimated Energy Needs: 3313-6978 kcals (25-30 Kcal/Kg)  Justification: maintenance  Estimated Protein Needs:  grams protein (1.2-1.5 g pro/Kg)  Justification: hypercatabolism with acute illness  Estimated Fluid Needs: 4995-0277 mL (1 mL/Kcal)  Justification: maintenance    MALNUTRITION:  % Weight Loss:  > 2% in 1 week (severe malnutrition)  % Intake:  </= 50% for >/= 5 days (severe malnutrition)  Subcutaneous Fat Loss:  Unable to observe but suspected   Muscle Loss:  Unable to observe but suspected   Fluid Retention:  None noted    Malnutrition Diagnosis: Severe malnutrition  In Context of:  Acute illness or injury    NUTRITION DIAGNOSIS:  Inadequate protein-energy intake related to NPO, plans for TF initiation today as evidenced by meeting 0% protein and 50% preliminary energy needs from Propofol       NUTRITION INTERVENTIONS  Recommendations / Nutrition Prescription  Vital HP at 10 mL/hr to provide:  240 kcal, 21 g protein (0.3 g/kg), 27 g CHO, 0 g fiber, 201 mL H2O  Total with Propofol = 982 kcal (13 kcal/kg)  Flushes 60 mL every 4 hours   Certavite daily     Implementation  Nutrition education: Not appropriate at this time due to patient condition  EN Composition, EN Schedule, Feeding Tube Flush, Multivitamin/Mineral:  Trickle TF, flushes, and Certavite ordered as above   Collaboration and Referral of Nutrition care:  Patient discussed today during interdisciplinary bedside rounds     Nutrition Goals  Patient will meet % preliminary needs within the next 2-3 days     MONITORING AND EVALUATION:  Progress towards goals will be monitored and evaluated per protocol and Practice Guidelines    Светлана Salinas RD, LD, CNSC   Clinical Dietitian - [facility]

## 2021-12-06 NOTE — PROGRESS NOTES
[facility] Intensive Care Unit  Intensivist Note  December 5, 2021    Assessment and Plan:  Andrew Alvarez is a 70 year old male admitted on (12/5) for acute hypoxic resp failure and remains critically ill due to following problems.   I have personally reviewed the daily labs, imaging studies, cultures and discussed the case during multidisciplinary ICU meeting with entire team.     Neurology and Psychiatry:   ## No issues prior to presentation  -Sedation and analgesia achieved by; Propofol and fentanyl    Pulmonary:   ## COVID 19 pneumonia/ARDS, requiring mechanical ventilation. CXR and CT chest as below. Underlying ILD changes on CT chest  -Resume decadron 6 mg IV for a toal of 10 days  -Remdesivir 5 days  -Check/trend CRP, consider Toci  -Check D dimer    Cardiovascular system:   ## No issues. Normal lactate on 12/1    Renal/Electrolytes:   ## Normal renal functions, normal electrolytes  -Monitor     ID:   ## COVID pneumonia. Check procalcitonin  -See pulmonary    GI//Nutrition:   ## No issues  - Nutritionist consult for feeding  - IV PPI    Endocrine:   ## Type II DM, hyperglycemia  - Insulin per protocol    Heme/Onc:   ## Normal counts  - Monitor    ICU prophylaxis:  LMWH, head of the bed elevated at 30 degrees, oral care, IV PPI, restraints (yes), lines needed (yes)    Billing: total time spend providing critical care was 45 min, excluding procedure time.    HPI:  70 years old gentleman transferred from [facility] emergency department where he presented on December 1 with chest congestion and nonproductive cough, since Thanksgiving.  His wife also had similar symptoms, associated with fever and body aches.  There is history of exposure to Covid.  Patient tested is positive for COVID-19 and boarded in the emergency department since up until today the fever contacted due to deteriorating respiratory status.  Patient is unvaccinated for Covid.  He has been on BiPAP since he was presented to the emergency  department and subsequently intubated failure this morning.  He also experienced agitation that was treated with Zyprexa, Precedex and Haldol.  Post intubation, he was started on propofol and Versed drips with fentanyl pushes.  EKG was normal.    Allergies codeine with reaction of rash  Past medical history: Type 2 diabetes, hepatitis C, SHOLA on CPAP  Past surgical history: Shoulder surgery in 2000, tonsillectomy 1990  Family history is significant for CVA and heart disease/diabetes.  Social history , current smoker quarter of a pack, no alcohol use currently according to records.    Medications:  Reviewed    Physical examination:  General: sedated  There were no vitals taken for this visit.  HEENT: neck supple, symmetrical, no palpable nodes  Lungs: Auscultation of lungs- decreased bronchovesicular sounds, no expirium prolongation, wheezing, rhonci and crackles  CVS: Normal S1 and S2, no additional heart sounds, murmur  Abdomen: Bowel sounds present, soft, non tender, non distended  Extremities/musculoskeletal: no edema, deformity, cyanosis  Neurology: sedated  Skin: no rash,ecchymosis  Psychiatry: Can not evaluate due to sedation   Exam of Line sites: No erythema or discharge.          Data:   Recent Labs   Lab 12/05/21  1445 12/05/21  1205 12/05/21  1123 12/05/21  0347 12/04/21  2219 12/04/21  0903 12/03/21  1321   PH 7.26* 7.21*  --   --   --  7.45 7.45   PCO2 57* 65*  --   --   --  35 33*   PO2 148* 114*  --   --   --  67* 75*   HCO3 25 26  --   --   --  24 23   O2PER 100 100 100 80   < > 80 80    < > = values in this interval not displayed.     Recent Labs   Lab 12/01/21  1537   WBC 5.3   RBC 4.67   HGB 14.1   HCT 41.9        Recent Labs   Lab 12/05/21  0416 12/04/21  2045 12/04/21  1208 12/02/21  0746 12/01/21  1537     --   --   --  139   POTASSIUM 4.2  --   --   --  3.8   CHLORIDE 114*  --   --   --  109   CO2 24  --   --   --  24   BUN 36*  --   --   --  25   CR 0.61*  --   --   --  0.91    * 273* 212*   < > 215*   VICKI 8.0*  --   --   --  8.0*    < > = values in this interval not displayed.     MRSA nasal PCR: pending  Procalcitonin: pending  EKG:   CXR: 12/5 interstitial changes bilaterally   Chest CT: 12/1 No PE, changes suggesting ILD, superimposed patchy opacifications, prominent mediastinal nodes/likely reactive    H. Tod Burdick MD  #[phone]

## 2021-12-06 NOTE — ANESTHESIA POSTPROCEDURE EVALUATION
Patient: Andrew Alvarez    Procedure: * No procedures listed *       Diagnosis:* No pre-op diagnosis entered *  Diagnosis Additional Information: No value filed.    Anesthesia Type:  No value filed.    Note:  Disposition: Admission ([facility])   Postop Pain Control:    PONV:    Neuro/Psych:    Airway/Respiratory:    CV/Hemodynamics:    Other NRE:    DID A NON-ROUTINE EVENT OCCUR? No    Event details/Postop Comments:  Patient admitted to [facility], intubated and sedated. Care per  intensive team.           Last vitals:  Vitals Value Taken Time   BP     Temp 100.58  F (38.1  C) 12/06/21 1244   Pulse 73 12/06/21 1244   Resp 15 12/06/21 1244   SpO2 95 % 12/06/21 1244   Vitals shown include unvalidated device data.    Electronically Signed By: MARCO A Disla CRNA  December 6, 2021  12:45 PM

## 2021-12-06 NOTE — PROCEDURES
Procedure:   Arterial line placement (femoral, right)        Indication:   Hypotension        Consent:   Omitted due to medical emergency.        Procedure Summary:   The patient was prepped and draped in the conventional sterile manner using chlorhexidine scrub. 1% lidocaine was used to numb the region. The right femoral artery was accessed using a needle. Pulsatile, arterial blood was visualized and the wire was threaded using the Seldinger technique followed by a catheter through the wire. The catheter was sutured into place.   Good wave-form was obtained.   The patient tolerated the procedure well without any immediate complications.   The area was cleaned and Tegaderm was applied.  Time out was performed.        Protection/Precaution measures:   Mask with eye protection, cap, gown, gloves were used: Yes    This procedure is performed by DOROTHY Burdick MD.

## 2021-12-06 NOTE — PROGRESS NOTES
[facility] ICU RESPIRATORY NOTE        Date of Admission: 12/5/2021    Date of Intubation (most recent): 12/5/21    Reason for Mechanical Ventilation: Respiratory Failure    Number of Days on Mechanical Ventilation: 2    Met Criteria for Spontaneous Breathing Trial: no    Reason for No Spontaneous Breathing Trial: PEEP>8 and full strength Veletri    Significant Events Today: Full strength Veletri started today. PEEP increased to 15. Patient proned at 16:20.    ABG Results:   Recent Labs   Lab 12/06/21  1528 12/06/21  1159 12/06/21  0603 12/05/21  1945   PH 7.32* 7.35 7.37 7.29*   PCO2 50* 48* 46* 50*   PO2 82 79* 65* 83   HCO3 26 27 26 24   O2PER 85 85 85 85       Current Vent Settings: Ventilation Mode: CMV/AC  (Continuous Mandatory Ventilation/ Assist Control)  FiO2 (%): 85 %  Rate Set (breaths/minute): 18 breaths/min  Tidal Volume Set (mL): 400 mL  PEEP (cm H2O): 15 cmH2O  Oxygen Concentration (%): 85 %  Resp: 18      Skin Assessment: intact    Plan: Q2H head turns while proned. Supine when able.    Emiliana Byers, RT

## 2021-12-06 NOTE — PLAN OF CARE
Neuro: sedated intubated, unreponsive to painful stimuli but overbreathes when sedation titrated down.  CV: SR/SB. BP stable on levophed  Resp: LS coarse, scant secretions.  GI/: reynolds in place BS hypo  Skin: scattered brusing  Pain/Gtts: on levophed, propofol, fentanyl. Insulin gtt started

## 2021-12-06 NOTE — PROGRESS NOTES
Shift: 9672-3838    Major Events: Pradeep started this AM. Proned at 1620. Talked w/ Dr. Duval about ABGs- no changes.     Neuro: Pupils equal- sluggish. Does not w/d to pain. Sedated on propofol gtt and fentanyl gtt. Low grade fever- blood cultures sent; rectal tylenol given.   Cardiac: SR. Levophed gtt. Pulses palpable.   Resp: Full vent support. FiO2 weaned down to 60% (when supined was at 85%), peep 15. Lungs coarse, very diminished. Thick secretions from ETT. Sputum sent.   GI: Started trickle feeds at 2200. NJ placed at 96cm. Insulin gtt.   : Winter in place, adequate UOP. UA sent.  Skin: No major issues. Blanchable redness on sacrum.   Labs: Following ABGs closely.   Family: Updated wife, Coby, on POC.

## 2021-12-06 NOTE — PROGRESS NOTES
Intensivist note  Comfortable on vent.   He is on 85%, +12 PEEP with oxygen saturations about 95%; PIP's 25 and IPP's about 19. PEEP increased to +15 and will plan to prone patient today. He remains on veletri.     Lungs with distant breath sounds; Heart RRR; abdomen soft, non-tender; extremities warm with minimal edema; skin shows no cyanosis or mottling     Labs and CXR reviewed     Hospital admission ([facility]): 12/1  ICU Admission: 12/5 (intubation 12/5    Assessment and Plan   1. Covid lung disease  2. Acute respiratory failure/ARDS due to #1- as above, severe and will require proning, and PEEP increased. He has completed Remdesivir and is on decadron.   3. Shock - on Levophed; no evidence of bacterial infection but will send surveillance cultures and monitor only; due to #1.   4. DM - on IV insuling  5. Nutrition - enteral.   Overall, acute and critical. I spent 35 minutes of critical care time with him today. I gave update via phone to patients wife and daughter.     gary gandara  December 6, 2021

## 2021-12-07 NOTE — PROVIDER NOTIFICATION
MD Notification    Notified Person: MD    Notified Person Name: Kyle    Notification Date/Time: 12/7 @ 0107    Notification Interaction: phone    Purpose of Notification: CO2 trending up, increase RR on ventilator?    Orders Received: no vent changes needed.     Comments: will continue to monitor.

## 2021-12-07 NOTE — PROGRESS NOTES
[facility] ICU RESPIRATORY NOTE           Date of Admission: 12/5/2021     Date of Intubation (most recent): 12/5/21     Reason for Mechanical Ventilation: Respiratory Failure     Number of Days on Mechanical Ventilation: 3     Met Criteria for Spontaneous Breathing Trial: no     Reason for No Spontaneous Breathing Trial: PEEP>8 and full strength Veletri     Significant Events Today: none.    05:00 I increased RR  From 18 to 20 to blow off CO2  Per ABG result.    Recent Labs   Lab 12/07/21  0002 12/06/21  1739 12/06/21  1528 12/06/21  1159   PH 7.29* 7.30* 7.32* 7.35   PCO2 57* 55* 50* 48*   PO2 93 105 82 79*   HCO3 27 27 26 27   O2PER 60 60 85 85   Ventilation Mode: CMV/AC  (Continuous Mandatory Ventilation/ Assist Control)  FiO2 (%): 55 %  Rate Set (breaths/minute): 18 breaths/min  Tidal Volume Set (mL): 400 mL  PEEP (cm H2O): 15 cmH2O  Oxygen Concentration (%): 60 %  Resp: 21    Plan: Monitor pt on full vent support proned.

## 2021-12-07 NOTE — PLAN OF CARE
Assumed pt care @ 2300  Neuro: PERRL, does not withdraw or arouse to stimulation. RASS -4 to -5.   CV: SR to ST overnight, TMAX 101 prn tylenol, pulses palpable.   Resp: tolerating vent settings, rate increased to 20 for high CO2  GI/: reynolds with good UOP, no BM this shift. Tolerating trickle feeds.   Skin: no change.   Continue to monitor.

## 2021-12-07 NOTE — PROGRESS NOTES
I am a student from [employer].  I am accessing this chart under the supervision of my clinical instructor or respiratory care services staff.

## 2021-12-07 NOTE — PROGRESS NOTES
CLINICAL NUTRITION SERVICES - BRIEF NOTE    Chart reviewed and patient discussed this morning during interdisciplinary rounds. Pt started on trickle TF yesterday. Remains on high pressor requirements. MD requesting TF increase in rounds.     Labs reviewed. K+ 4 (WNL), Mg 3.2 (H), Phos 2.4 (L). TG 59. -115.   Meds reviewed. Certavite for micronutrient needs, insulin gtt, MIVF (LR) at 50 ml/hr, Norepi (0.3 mcg/kg/min at 86.6 ml/hr), Propofol at 20.5 ml/hr (541 kcal)      ASSESSED NUTRITION NEEDS PER APPROVED PRACTICE GUIDELINES:   Dosing Weight 77 kg   1st Week on TF:  2693-1378 kcal (70% needs)  Estimated Energy Needs: 6557-8904 kcals (25-30 Kcal/Kg)  Justification: maintenance  Estimated Protein Needs:  grams protein (1.2-1.5 g pro/Kg)  Justification: hypercatabolism with acute illness    Interventions:  Increase TF to 20 ml/hr per MD order    Vital HP at 20 ml/hr (480 ml/day) to provide 480 kcal, 42 g protein (0.5 g/kg, 47% needs), 53 g CHO, 401 ml free water and no fiber daily   TOTAL w/ Propofol = 1021 kcal (13 kcal/kg, 76% preliminary needs)    Do not recommend advancing TF further at this time with current pressor demands d/t risk of inadequate perfusion to bowel.     RD will continue to follow and monitor per protocol.     Neris Quiroz RD, LD  ICU RD Pager: [phone]

## 2021-12-08 NOTE — DEATH PRONOUNCEMENT
MD DEATH PRONOUNCEMENT    Called to pronounce Andrew Alvarez dead.    Physical Exam: Spontaneous respirations absent, Heart sounds absent and Pupillary light reflex absent    Patient was pronounced dead at 7:37 AM, 2021.    Preliminary Cause of Death: COVID-19, Peudomonas septic shock    Active Problems:    Acute respiratory failure with hypoxia (H)       Infectious disease present?: YES    Communicable disease present? (examples: HIV, chicken pox, TB, Ebola, CJD) :  YES    Multi-drug resistant organism present? (example: MRSA): NO    Please consider an autopsy if any of the following exist:  NO Unexpected or unexplained death during or following any dental, medical, or surgical diagnostic treatment procedures.   NO Death of mother at or up to seven days after delivery.     NO All  and pediatric deaths.     NO Death where the cause is sufficiently obscure to delay completion of the death certificate.   NO Deaths in which autopsy would confirm a suspected illness/condition that would affect surviving family members or recipients of transplanted organs.     The following deaths must be reported to the 's Office:  NO A death that may be due entirely or in part to any factors other than natural disease (recent surgery, recent trauma, suspected abuse/neglect).   NO A death that may be an accident, suicide, or homicide.     NO Any sudden, unexpected death in which there is no prior history of significant heart disease or any other condition associated with sudden death.   NO A death under suspicious, unusual, or unexpected circumstances.    NO Any death which is apparently due to natural causes but in which the  does not have a personal physician familiar with the patient s medical history, social, or environmental situation or the circumstances of the terminal event.   NO Any death apparently due to Sudden Infant Death Syndrome.     NO Deaths that occur during, in association with,  or as consequences of a diagnostic, therapeutic, or anesthetic procedure.   NO Any death in which a fracture of a major bone has occurred within the past (6) six months.   NO A death of persons note seen by their physician within 120 days of demise.     NO Any death in which the  was an inmate of a public institution or was in the custody of Law Enforcement personnel.   NO  All unexpected deaths of children   NO Solid organ donors   NO Unidentified bodies   NO Deaths of persons whose bodies are to be cremated or otherwise disposed of so that the bodies will later be unavailable for examination;   NO Deaths unattended by a physician outside of a licensed healthcare facility or licensed residential hospice program   NO Deaths occurring within 24 hours of arrival to a health care facility if death is unexpected.    NO Deaths associated with the decedent s employment.   NO Deaths attributed to acts of terrorism.   NO Any death in which there is uncertainty as to whether it is a medical examiner s care should be discussed with the medical investigator.        Body disposition: Body released to the morgue.

## 2021-12-08 NOTE — PROGRESS NOTES
I received pt proned on full vent support. BBS: Diminished. Suctioned for s.thin candelario secretions. SPO2 92-93%.  Recent Labs   Lab 12/08/21  0159 12/07/21  2349 12/07/21  1137 12/07/21  0357   PH 7.18* 7.08* 7.22* 7.27*   PCO2 67* 77* 66* 59*   PO2 71* 89 69* 106*   HCO3 25 23 27 27   O2PER 100 100 75 55   Ventilation Mode: CMV/AC  (Continuous Mandatory Ventilation/ Assist Control)  FiO2 (%): 90 %  Rate Set (breaths/minute): 20 breaths/min  Tidal Volume Set (mL): 450 mL  PEEP (cm H2O): 15 cmH2O  Oxygen Concentration (%): 75 %  Resp: 27    Plan: Monitor pt on full vent support proned.

## 2021-12-08 NOTE — DISCHARGE SUMMARY
[facility]    Death Summary  [facility]ensive [facility]    Date of Admission:  2021  Date of Death:         2021  Provider Completing Death Summary: Brady Fu    Discharge Diagnoses   Patient Active Problem List   Diagnosis     Cellulitis of finger of left hand     SHOLA (obstructive sleep apnea)     Cigarette smoker     Type 2 diabetes mellitus without complication, without long-term current use of insulin (H)     Chronic hepatitis C without hepatic coma (H)     Insect bite of hand with infection     Acute respiratory failure with hypoxia (H)   COVID-19  Pseudomonal septic shock    History of Present Illness   Andrew Alvarez is an 70 year old male who presented with fever, chills progressive shortness of breath.  He was tested for COVID-19 and found to be positive.  He had progressive respiratory failure ultimately leading to intubation and mechanical ventilation.  After several days he developed hypotension high fevers and was found to have Pseudomonas aeruginosa bloodstream infection and pneumonia.  He was started on cefepime, but had progressive clinical decline over the ensuing 18 hours.  He developed fulminant shock requiring 3 vasopressors and ultimately became extremely hypotensive and .    Hospital Course   Andrew Alvarez was admitted on 2021.  The following problems were addressed during his hospitalization:    COVID-19, Pseudomonas bacteremia and septic shock    Cause of death: COVID-19  Pseudomonas sepsis    Brady Fu MD    Significant Results and Procedures   COVID-19 positive  Pseudomonas in sputum and blood cultures    Pending Results   Unresulted Labs Ordered in the Past 30 Days of this Admission     Date and Time Order Name Status Description    2021  3:38 PM Blood Culture Hand, Left Preliminary     2021  3:38 PM Blood Culture Arm, Right Preliminary     2021  2:55 PM Respiratory Aerobic Bacterial  Culture with Gram Stain Preliminary           Primary Care Physician   Kentrell Eckert    Consultations This Hospital Stay   PHARMACY IP CONSULT  NUTRITION SERVICES ADULT IP CONSULT  PHARMACY IP CONSULT  PHARMACY TO DOSE [facility]  NEPHROLOGY IP CONSULT    Time Spent on this Encounter   I, Brady Fu MD, personally saw the patient today and spent greater than 30 minutes discharging this patient.    Data   Most Recent 3 CBC's:Recent Labs   Lab Test 12/08/21 0427 12/07/21 0352 12/06/21  0413   WBC 3.9* 15.5* 19.3*   HGB 13.5 13.5 13.5   MCV 99 95 93    337 384      Most Recent 3 BMP's:  Recent Labs   Lab Test 12/08/21  0430 12/08/21  0427 12/08/21  0201 12/07/21  1138 12/07/21  1137 12/07/21 0358 12/07/21  0352 12/06/21  0542 12/06/21  0413   NA  --  151*  --   --   --   --  151*  --  147*   POTASSIUM  --  5.2  --   --  4.0  --  4.3  --  4.7   CHLORIDE  --  117*  --   --   --   --  122*  --  117*   CO2  --  28  --   --   --   --  27  --  25   BUN  --  62*  --   --   --   --  38*  --  48*   CR  --  2.69*  --   --   --   --  0.80  --  0.90   ANIONGAP  --  6  --   --   --   --  2*  --  5   VICKI  --  6.7*  --   --   --   --  7.8*  --  8.0*   GLC 94 102* 101*   < >  --    < > 114*   < > 320*  290*    < > = values in this interval not displayed.     Most Recent 2 LFT's:  Recent Labs   Lab Test 12/08/21 0427 12/07/21 0352   AST 94* 14   ALT 43 19   ALKPHOS 40 47   BILITOTAL 1.5* 0.5     Most Recent INR's and Anticoagulation Dosing History:  Anticoagulation Dose History     Recent Dosing and Labs Latest Ref Rng & Units 12/1/2021    INR 0.85 - 1.15 0.98        Most Recent 3 Troponin's:No lab results found.  Most Recent Cholesterol Panel:  Recent Labs   Lab Test 12/07/21  0352 03/08/21  0755   CHOL  --  206*   LDL  --  111*   HDL  --  38*   TRIG 59 285*     Most Recent 6 Bacteria Isolates From Any Culture (See EPIC Reports for Culture Details):  Recent Labs   Lab Test 03/06/21  1328 09/29/19  1346  09/29/19  1345 09/28/19  0115 09/27/19  2316 09/27/19  2258   CULT Methicillin resistant Staphylococcus aureus (MRSA)* No anaerobes isolated  No growth No growth No MRSA isolated No growth No growth     Most Recent TSH, T4 and A1c Labs:  Recent Labs   Lab Test 12/06/21  0413   A1C 7.4*     Results for orders placed or performed during the hospital encounter of 12/05/21   XR Chest Port 1 View    Narrative    EXAM: XR CHEST PORT 1 VIEW  LOCATION: [facility]  DATE/TIME: 12/5/2021 10:00 PM    INDICATION: Endotracheal tube positioning.  COMPARISON: 12/5/2021.      Impression    IMPRESSION: Endotracheal tube tip approximately 4 cm above the rosi. Enteric tube tip below the diaphragm. Left IJ central venous catheter tip over the upper SVC. Heart size and pulmonary vascularity within normal limits. Mild hazy mid and lower lung   infiltrates bilaterally, left greater than right. These are slightly improved from the prior study. Old right rib fractures. Remainder unremarkable.   XR Abdomen Port 1 View    Narrative    ABDOMEN PORTABLE ONE VIEWS December 6, 2021 2:22 PM     HISTORY: NJ placement.    COMPARISON: None available.      Impression    IMPRESSION: Single supine view of the abdomen and pelvis was obtained.  Feeding tube distal tip projects over proximal duodenum.  Nonobstructive bowel gas pattern.    JOSE DONIS MD         SYSTEM ID:  [id number]     Brady Fu MD  [facility] Intensivist Service

## 2021-12-08 NOTE — PROGRESS NOTES
Critical Care Note     70M admitted 12/5 for acute hypoxemic respiratory failure and ARDS 2/2 COVID-19. Over the past 18 hours, has had worsening respiratory acidosis, despite increases in minute ventilation.     Recent Labs   Lab 12/08/21  0428 12/08/21  0159 12/07/21  2349 12/07/21  1137   PH 7.14* 7.18* 7.08* 7.22*   PCO2 77* 67* 77* 66*   PO2 54* 71* 89 69*   HCO3 26 25 23 27   O2PER 100 100 100 75     In addition to vent changes, pt given 2 amps of HCO3 and started on HCO3 gtt, which has been uptitrated over the night.     No anion gap. BG normal. Last lactate 1.9, now 8.7.     He has continued to be febrile, Tm 102.7. He has 2/2 BCx growing gram negative bacilli, Pseudomonas by Verigene. He is on cefepime.    He is currently on norepinephrine, vasopressin, and angiotensin II with MAPs in the 50s. He responded to 1g CaCl and will redose this.     I called and spoke with the wife and the daughter. I explained the dire situation and that we have maximized support. I do not feel that his current situation is survivable. I discussed with both wife and daughter that CPR would be futile, wife initially asked me to speak with her daughter about this and daughter agreed to DNR. I explained that in my opinion we should transition to a comfort approach, the wife did not give an answer to this and again asked me to speak to the daughter. The daughter said she would need to speak more with her mother. At this time, we will continue with supportive cares, but he has been changed to DNR. The family will call back to the unit when they make a decision.     Total time spent providing critical care was 45 minutes, excluding procedure time.     Bay Wright MD  Pulmonary Disease and Critical Care Medicine  [facility]

## 2021-12-08 NOTE — PROGRESS NOTES
Intensivist note    He has severe COVID lung disease; his oxygenation is marginal and he is being prone positioned again.     Lung with distant breath sounds; heart RRR; abdomen soft and non-tender; extremities are warm with mild edema; skin shows no cyanosis or mottling.       Assessment and Plan   1. Covid lung disease  2. Acute respiratory failure/ARDS due to #1- currently on 75% +15 PEEP, as above. He has completed Remdesivir and is on decadron.   3. Shock - on Levophed and vasopressin; no evidence of bacterial infection but will send surveillance cultures and monitor only; due to #1.   4. DM - on IV insuling  5. Nutrition - enteral.   Overall, acute and critical. I spent 40 minutes of critical care time with him today.     gary gandara  December 7, 2021

## 2021-12-08 NOTE — PLAN OF CARE
Patient prone on day shift, ABG q 6 H. Due to low Ph, ABG was checked Q 2 H, sodium bicarbonate started.  Neuro: sedated, intubated, paralyzed, unreponsive RASS -45. TOF 0/4 @ 5   CV: ST, -125, MAP > 65 on Vasopressin, levophed continuous, angiotensin started. T max 102.7,   Resp: LS coarse, moderate pink thick secretions.  GI/:  NPO, TF at goal, reynolds in place with decreasing UOP,  BS hypoactive.  Skin: Cool, mottled, cyanotic.  Pain/Gtts: on levophed, propofol, fentanyl gtt, Insulin off most of the shift due to low BG.

## 2021-12-08 NOTE — PROGRESS NOTES
Patient passed at 07:37; Family notified by MD. Record of death completed, ruled out by [facility] as a candidate for donation. Two family members (son and daughter-in-law) collected patient belongings after visiting inside of the patient's room. Visitors given information about risks when visiting inside of a COVID-19 positive patient. Visitors wore protective gowns, gloves and face masks, instructed they now have to quarantine for two weeks; reported understanding. ANS notified.

## 2021-12-08 NOTE — PLAN OF CARE
Pt supine at 10am. Lasted 4 hours, sats and BP dropped. Sputum and blood cultures positive for gram negative bacilli. Zosyn started. Vasopressin and levo running. Patient paralyzed and proned. Low urine output.

## 2021-12-09 LAB
BACTERIA BLD CULT: ABNORMAL
BACTERIA BLD CULT: ABNORMAL

## 2021-12-11 LAB
BACTERIA SPT CULT: ABNORMAL
GRAM STAIN RESULT: ABNORMAL
GRAM STAIN RESULT: ABNORMAL

## 2021-12-13 LAB
BACTERIA BLD CULT: ABNORMAL
BACTERIA BLD CULT: ABNORMAL

## 2021-12-14 DIAGNOSIS — E11.9 TYPE 2 DIABETES MELLITUS WITHOUT COMPLICATION, WITHOUT LONG-TERM CURRENT USE OF INSULIN (H): ICD-10-CM

## (undated) DEVICE — SPECIMEN CULTURETTE DBL SWAB 220109

## (undated) DEVICE — SOL WATER IRRIG 1000ML BOTTLE 07139-09

## (undated) DEVICE — GLOVE PROTEXIS BLUE W/NEU-THERA 8.0  2D73EB80

## (undated) DEVICE — SOL NACL 0.9% IRRIG 1000ML BOTTLE 07138-09

## (undated) DEVICE — PACKING IODOFORM STRIP 1/4" 7831

## (undated) DEVICE — TUBE CULTURE ANAEROBIC PORT-A-CUL 11ML

## (undated) DEVICE — BNDG ELASTIC 4"X5YDS UNSTERILE 6611-40

## (undated) DEVICE — PACK HAND WRIST FOREARM CUSTOM

## (undated) DEVICE — GLOVE PROTEXIS W/NEU-THERA 7.5  2D73TE75

## (undated) RX ORDER — FENTANYL CITRATE 50 UG/ML
INJECTION, SOLUTION INTRAMUSCULAR; INTRAVENOUS
Status: DISPENSED
Start: 2019-09-29

## (undated) RX ORDER — DEXAMETHASONE SODIUM PHOSPHATE 10 MG/ML
INJECTION, SOLUTION INTRAMUSCULAR; INTRAVENOUS
Status: DISPENSED
Start: 2019-09-29

## (undated) RX ORDER — PROPOFOL 10 MG/ML
INJECTION, EMULSION INTRAVENOUS
Status: DISPENSED
Start: 2019-09-29

## (undated) RX ORDER — ONDANSETRON 2 MG/ML
INJECTION INTRAMUSCULAR; INTRAVENOUS
Status: DISPENSED
Start: 2019-09-29